# Patient Record
Sex: MALE | Race: WHITE | Employment: FULL TIME | URBAN - METROPOLITAN AREA
[De-identification: names, ages, dates, MRNs, and addresses within clinical notes are randomized per-mention and may not be internally consistent; named-entity substitution may affect disease eponyms.]

---

## 2017-05-02 DIAGNOSIS — I10 ESSENTIAL HYPERTENSION: ICD-10-CM

## 2017-05-02 RX ORDER — LISINOPRIL 10 MG/1
10 TABLET ORAL DAILY
Qty: 30 TABLET | Refills: 3 | Status: SHIPPED | OUTPATIENT
Start: 2017-05-02 | End: 2017-07-13 | Stop reason: SDUPTHER

## 2017-07-13 ENCOUNTER — OFFICE VISIT (OUTPATIENT)
Dept: FAMILY MEDICINE CLINIC | Age: 47
End: 2017-07-13
Payer: COMMERCIAL

## 2017-07-13 VITALS
WEIGHT: 296.2 LBS | TEMPERATURE: 97.5 F | BODY MASS INDEX: 41.47 KG/M2 | DIASTOLIC BLOOD PRESSURE: 77 MMHG | HEART RATE: 100 BPM | SYSTOLIC BLOOD PRESSURE: 119 MMHG | HEIGHT: 71 IN

## 2017-07-13 DIAGNOSIS — E55.9 HYPOVITAMINOSIS D: ICD-10-CM

## 2017-07-13 DIAGNOSIS — I10 ESSENTIAL HYPERTENSION: Primary | ICD-10-CM

## 2017-07-13 DIAGNOSIS — Z72.0 TOBACCO ABUSE: ICD-10-CM

## 2017-07-13 DIAGNOSIS — E66.01 MORBID OBESITY DUE TO EXCESS CALORIES (HCC): ICD-10-CM

## 2017-07-13 PROCEDURE — 99213 OFFICE O/P EST LOW 20 MIN: CPT | Performed by: FAMILY MEDICINE

## 2017-07-13 RX ORDER — LISINOPRIL 10 MG/1
10 TABLET ORAL DAILY
Qty: 90 TABLET | Refills: 1 | Status: SHIPPED | OUTPATIENT
Start: 2017-07-13 | End: 2018-02-01 | Stop reason: SDUPTHER

## 2017-07-13 ASSESSMENT — ENCOUNTER SYMPTOMS
CONSTIPATION: 0
ABDOMINAL PAIN: 0
COUGH: 0
COLOR CHANGE: 0
DIARRHEA: 0
TROUBLE SWALLOWING: 0
SHORTNESS OF BREATH: 0
VOMITING: 0
NAUSEA: 0

## 2018-02-01 DIAGNOSIS — I10 ESSENTIAL HYPERTENSION: ICD-10-CM

## 2018-02-01 NOTE — TELEPHONE ENCOUNTER
Please address the medication refill and close the encounter. If I can be of assistance, please route to the applicable pool. Thank you. Next Visit Date:  No future appointments.     Health Maintenance   Topic Date Due    Potassium monitoring  07/30/2017    Creatinine monitoring  07/30/2017    Flu vaccine (1) 09/01/2017    Diabetes screen  04/13/2019    Lipid screen  03/12/2021    DTaP/Tdap/Td vaccine (2 - Td) 08/25/2026    Pneumococcal med risk  Completed    HIV screen  Completed       Hemoglobin A1C (no units)   Date Value   04/13/2016 5.4             ( goal A1C is < 7)   No results found for: LABMICR  LDL Cholesterol (mg/dL)   Date Value   03/12/2016 148 (H)       (goal LDL is <100)   AST (U/L)   Date Value   03/12/2016 19     ALT (U/L)   Date Value   03/12/2016 21     BUN (mg/dL)   Date Value   07/30/2016 13     BP Readings from Last 3 Encounters:   07/13/17 119/77   11/08/16 140/90   08/25/16 136/80          (goal 120/80)    All Future Testing planned in [facility]  Lab Frequency Next Occurrence               Patient Active Problem List:     Elevated blood pressure reading     Smoker     Morbid obesity ([address]     Need for vaccination     Right arm numbness     Hypovitaminosis D     Tobacco abuse     Essential hypertension     Smoking

## 2018-02-02 RX ORDER — LISINOPRIL 10 MG/1
TABLET ORAL
Qty: 30 TABLET | Refills: 0 | Status: SHIPPED | OUTPATIENT
Start: 2018-02-02 | End: 2018-03-05 | Stop reason: SDUPTHER

## 2018-03-05 ENCOUNTER — OFFICE VISIT (OUTPATIENT)
Dept: FAMILY MEDICINE CLINIC | Age: 48
End: 2018-03-05
Payer: COMMERCIAL

## 2018-03-05 VITALS
HEART RATE: 102 BPM | TEMPERATURE: 98.8 F | BODY MASS INDEX: 43.82 KG/M2 | DIASTOLIC BLOOD PRESSURE: 89 MMHG | SYSTOLIC BLOOD PRESSURE: 154 MMHG | WEIGHT: 313 LBS | HEIGHT: 71 IN

## 2018-03-05 DIAGNOSIS — M25.561 CHRONIC PAIN OF RIGHT KNEE: ICD-10-CM

## 2018-03-05 DIAGNOSIS — M85.60 CYST, BONE: ICD-10-CM

## 2018-03-05 DIAGNOSIS — I10 ESSENTIAL HYPERTENSION: Primary | ICD-10-CM

## 2018-03-05 DIAGNOSIS — E55.9 VITAMIN D DEFICIENCY: ICD-10-CM

## 2018-03-05 DIAGNOSIS — G89.29 CHRONIC PAIN OF RIGHT KNEE: ICD-10-CM

## 2018-03-05 PROCEDURE — 99213 OFFICE O/P EST LOW 20 MIN: CPT | Performed by: HOSPITALIST

## 2018-03-05 RX ORDER — LISINOPRIL 10 MG/1
TABLET ORAL
Qty: 90 TABLET | Refills: 0 | Status: SHIPPED | OUTPATIENT
Start: 2018-03-05 | End: 2018-05-14 | Stop reason: SDUPTHER

## 2018-03-05 ASSESSMENT — ENCOUNTER SYMPTOMS
NAUSEA: 0
SORE THROAT: 0
RHINORRHEA: 0
COUGH: 0
VOMITING: 0
ABDOMINAL PAIN: 0
CONSTIPATION: 0
BACK PAIN: 0
WHEEZING: 0
BLOOD IN STOOL: 0
ANAL BLEEDING: 0
SHORTNESS OF BREATH: 0

## 2018-03-05 NOTE — PATIENT INSTRUCTIONS
ankles bent so that only your heels are digging into the floor. Your knees should be bent about 90 degrees. 2. Then push your heels into the floor, squeeze your buttocks, and lift your hips off the floor until your shoulders, hips, and knees are all in a straight line. 3. Hold for about 6 seconds as you continue to breathe normally, and then slowly lower your hips back down to the floor and rest for up to 10 seconds. 4. Do 8 to 12 repetitions. Hamstring curls    1. Lie on your stomach with your knees straight. If your kneecap is uncomfortable, roll up a washcloth and put it under your leg just above your kneecap. 2. Lift the foot of your injured leg by bending the knee so that you bring the foot up toward your buttock. If this motion hurts, try it without bending your knee quite as far. This may help you avoid any painful motion. 3. Slowly lower your leg back to the floor. 4. Do 8 to 12 repetitions. 5. With permission from your doctor or physical therapist, you may also want to add a cuff weight to your ankle (not more than 5 pounds). With weight, you do not have to lift your leg more than 12 inches to get a hamstring workout. Shallow standing knee bends    Do this exercise only if you have very little pain; if you have no clicking, locking, or giving way if you have an injured knee; and if it does not hurt while you are doing 8 to 12 repetitions. 1. Stand with your hands lightly resting on a counter or chair in front of you. Put your feet shoulder-width apart. 2. Slowly bend your knees so that you squat down like you are going to sit in a chair. Make sure your knees do not go in front of your toes. 3. Lower yourself about 6 inches. Your heels should remain on the floor at all times. 4. Rise slowly to a standing position. Heel raises    1. Stand with your feet a few inches apart, with your hands lightly resting on a counter or chair in front of you.   2. Slowly raise your heels off the floor while keeping your knees straight. 3. Hold for about 6 seconds, then slowly lower your heels to the floor. 4. Do 8 to 12 repetitions several times during the day. Follow-up care is a key part of your treatment and safety. Be sure to make and go to all appointments, and call your doctor if you are having problems. It's also a good idea to know your test results and keep a list of the medicines you take. Where can you learn more? Go to [email or web address]. org and sign in to your [employer] account. Enter N611 in the Kngine box to learn more about \"Knee: Exercises. \"     If you do not have an account, please click on the \"Sign Up Now\" link. Current as of: March 21, 2017  Content Version: 11.5  © 4140-1698 Healthwise, Incorporated. Care instructions adapted under license by [facility] ([facility]). If you have questions about a medical condition or this instruction, always ask your healthcare professional. [place] [address] any warranty or liability for your use of this information.

## 2018-03-06 NOTE — PROGRESS NOTES
Temp 98.8 °F (37.1 °C) (Temporal)   Ht 5' 11\" (1.803 m)   Wt (!) 313 lb (142 kg)   BMI 43.65 kg/m²    BP Readings from Last 3 Encounters:   03/05/18 (!) 154/89   07/13/17 119/77   11/08/16 140/90     Physical Exam   Constitutional: He is oriented to person, place, and time. He appears well-developed and well-nourished. Cardiovascular: Normal rate and regular rhythm. Pulmonary/Chest: Effort normal and breath sounds normal. No respiratory distress. Abdominal: Soft. Bowel sounds are normal. He exhibits no distension. Musculoskeletal: Normal range of motion. He exhibits no edema or tenderness. Right knee: He exhibits normal range of motion, normal alignment, no LCL laxity, normal meniscus and no MCL laxity. No tenderness found. No medial joint line, no lateral joint line, no MCL and no LCL tenderness noted. Arms:  Neurological: He is alert and oriented to person, place, and time. Lab Results   Component Value Date    WBC 6.3 03/12/2016    HGB 15.9 03/12/2016    HCT 47.0 03/12/2016     03/12/2016    CHOL 216 (H) 03/12/2016    TRIG 84 03/12/2016    HDL 51 03/12/2016    ALT 21 03/12/2016    AST 19 03/12/2016     07/30/2016    K 4.5 07/30/2016     07/30/2016    CREATININE 0.94 07/30/2016    BUN 13 07/30/2016    CO2 28 07/30/2016    TSH 1.73 03/12/2016    LABA1C 5.4 04/13/2016     Lab Results   Component Value Date    CALCIUM 9.4 07/30/2016     Lab Results   Component Value Date    LDLCHOLESTEROL 148 (H) 03/12/2016       Assessment:     1. Essential hypertension    2. Vitamin D deficiency    3. Chronic pain of right knee    4. Cyst, bone        Plan:    1. Essential hypertension  - stable  - lisinopril (PRINIVIL;ZESTRIL) 10 MG tablet; take 1 tablet by mouth once daily  Dispense: 90 tablet; Refill: 0  - Basic Metabolic Panel; Future    2.  Vitamin D deficiency  - will gt repeat level as previous level about 18 months ago was still low despite treatment  - Vitamin D 25 Medication Reason    ergocalciferol (DRISDOL) 34286 UNITS capsule Patient Choice    vitamin D (CHOLECALCIFEROL) 95538 UNIT CAPS Patient Choice    lisinopril (PRINIVIL;ZESTRIL) 10 MG tablet Reorder       Return in about 4 weeks (around 4/2/2018) for knee pain.

## 2018-05-14 ENCOUNTER — OFFICE VISIT (OUTPATIENT)
Dept: FAMILY MEDICINE CLINIC | Age: 48
End: 2018-05-14
Payer: COMMERCIAL

## 2018-05-14 VITALS
TEMPERATURE: 98.2 F | WEIGHT: 315 LBS | BODY MASS INDEX: 44.1 KG/M2 | HEIGHT: 71 IN | HEART RATE: 88 BPM | DIASTOLIC BLOOD PRESSURE: 73 MMHG | SYSTOLIC BLOOD PRESSURE: 130 MMHG

## 2018-05-14 DIAGNOSIS — M54.16 LUMBAR RADICULOPATHY: Primary | ICD-10-CM

## 2018-05-14 DIAGNOSIS — I10 ESSENTIAL HYPERTENSION: ICD-10-CM

## 2018-05-14 DIAGNOSIS — Z72.0 TOBACCO ABUSE: ICD-10-CM

## 2018-05-14 PROCEDURE — 99213 OFFICE O/P EST LOW 20 MIN: CPT | Performed by: HOSPITALIST

## 2018-05-14 RX ORDER — LISINOPRIL 10 MG/1
TABLET ORAL
Qty: 90 TABLET | Refills: 0 | Status: SHIPPED | OUTPATIENT
Start: 2018-05-14 | End: 2018-08-29 | Stop reason: SDUPTHER

## 2018-05-14 RX ORDER — NAPROXEN 500 MG/1
500 TABLET ORAL 2 TIMES DAILY WITH MEALS
Qty: 60 TABLET | Refills: 3 | Status: SHIPPED | OUTPATIENT
Start: 2018-05-14 | End: 2019-02-22 | Stop reason: SDUPTHER

## 2018-05-14 ASSESSMENT — PATIENT HEALTH QUESTIONNAIRE - PHQ9
SUM OF ALL RESPONSES TO PHQ QUESTIONS 1-9: 0
SUM OF ALL RESPONSES TO PHQ9 QUESTIONS 1 & 2: 0
1. LITTLE INTEREST OR PLEASURE IN DOING THINGS: 0
2. FEELING DOWN, DEPRESSED OR HOPELESS: 0

## 2018-05-14 ASSESSMENT — ENCOUNTER SYMPTOMS
ABDOMINAL PAIN: 0
BLOOD IN STOOL: 0
ANAL BLEEDING: 0
NAUSEA: 0
BACK PAIN: 0
VOMITING: 0
CONSTIPATION: 0

## 2018-08-29 DIAGNOSIS — I10 ESSENTIAL HYPERTENSION: ICD-10-CM

## 2018-08-29 RX ORDER — LISINOPRIL 10 MG/1
TABLET ORAL
Qty: 90 TABLET | Refills: 0 | Status: SHIPPED | OUTPATIENT
Start: 2018-08-29 | End: 2018-12-09 | Stop reason: SDUPTHER

## 2018-08-29 NOTE — TELEPHONE ENCOUNTER
Medication is on med list please review and address    Please address the medication refill and close the encounter. If I can be of assistance, please route to the applicable pool. Thank you. Last visit:n/a  Last Med refill:n/a    Next Visit Date:  No future appointments.     Health Maintenance   Topic Date Due    Potassium monitoring  07/30/2017    Creatinine monitoring  07/30/2017    Flu vaccine (1) 09/01/2018    Diabetes screen  04/13/2019    Lipid screen  03/12/2021    DTaP/Tdap/Td vaccine (2 - Td) 08/25/2026    Pneumococcal med risk  Completed    HIV screen  Completed       Hemoglobin A1C (no units)   Date Value   04/13/2016 5.4             ( goal A1C is < 7)   No results found for: LABMICR  LDL Cholesterol (mg/dL)   Date Value   03/12/2016 148 (H)       (goal LDL is <100)   AST (U/L)   Date Value   03/12/2016 19     ALT (U/L)   Date Value   03/12/2016 21     BUN (mg/dL)   Date Value   07/30/2016 13     BP Readings from Last 3 Encounters:   05/14/18 130/73   03/05/18 (!) 154/89   07/13/17 119/77          (goal 120/80)    All Future Testing planned in [facility]  Lab Frequency Next Occurrence   Basic Metabolic Panel Once 41/38/3329   Vitamin D 25 Hydroxy Once 09/05/2018   XR KNEE RIGHT (3 VIEWS) Once 09/04/2018               Patient Active Problem List:     Elevated blood pressure reading     Smoker     Morbid obesity ([address]     Need for vaccination     Right arm numbness     Hypovitaminosis D     Tobacco abuse     Essential hypertension     Smoking

## 2018-12-09 DIAGNOSIS — I10 ESSENTIAL HYPERTENSION: ICD-10-CM

## 2018-12-10 RX ORDER — LISINOPRIL 10 MG/1
TABLET ORAL
Qty: 30 TABLET | Refills: 0 | Status: SHIPPED | OUTPATIENT
Start: 2018-12-10 | End: 2019-01-30 | Stop reason: SDUPTHER

## 2019-01-20 DIAGNOSIS — I10 ESSENTIAL HYPERTENSION: ICD-10-CM

## 2019-01-21 RX ORDER — LISINOPRIL 10 MG/1
TABLET ORAL
Qty: 30 TABLET | Refills: 0 | OUTPATIENT
Start: 2019-01-21

## 2019-01-30 DIAGNOSIS — I10 ESSENTIAL HYPERTENSION: ICD-10-CM

## 2019-01-30 RX ORDER — LISINOPRIL 10 MG/1
TABLET ORAL
Qty: 30 TABLET | Refills: 0 | Status: SHIPPED | OUTPATIENT
Start: 2019-01-30 | End: 2019-02-22 | Stop reason: SDUPTHER

## 2019-02-22 ENCOUNTER — OFFICE VISIT (OUTPATIENT)
Dept: FAMILY MEDICINE CLINIC | Age: 49
End: 2019-02-22
Payer: COMMERCIAL

## 2019-02-22 VITALS
WEIGHT: 315 LBS | HEIGHT: 71 IN | HEART RATE: 90 BPM | SYSTOLIC BLOOD PRESSURE: 155 MMHG | DIASTOLIC BLOOD PRESSURE: 89 MMHG | BODY MASS INDEX: 44.1 KG/M2 | TEMPERATURE: 97.6 F

## 2019-02-22 DIAGNOSIS — G89.29 CHRONIC PAIN OF RIGHT KNEE: ICD-10-CM

## 2019-02-22 DIAGNOSIS — I10 ESSENTIAL HYPERTENSION: Primary | ICD-10-CM

## 2019-02-22 DIAGNOSIS — M25.561 CHRONIC PAIN OF RIGHT KNEE: ICD-10-CM

## 2019-02-22 PROCEDURE — 99213 OFFICE O/P EST LOW 20 MIN: CPT | Performed by: FAMILY MEDICINE

## 2019-02-22 PROCEDURE — 99211 OFF/OP EST MAY X REQ PHY/QHP: CPT | Performed by: FAMILY MEDICINE

## 2019-02-22 RX ORDER — NAPROXEN 500 MG/1
500 TABLET ORAL 2 TIMES DAILY WITH MEALS
Qty: 60 TABLET | Refills: 3 | Status: SHIPPED | OUTPATIENT
Start: 2019-02-22 | End: 2019-06-13 | Stop reason: SDUPTHER

## 2019-02-22 RX ORDER — LISINOPRIL 20 MG/1
TABLET ORAL
Qty: 30 TABLET | Refills: 3 | Status: SHIPPED | OUTPATIENT
Start: 2019-02-22 | End: 2019-06-08 | Stop reason: SDUPTHER

## 2019-02-22 ASSESSMENT — ENCOUNTER SYMPTOMS
COUGH: 0
ABDOMINAL PAIN: 0
DIARRHEA: 0
NAUSEA: 0
CONSTIPATION: 0
VOMITING: 0
SHORTNESS OF BREATH: 0

## 2019-02-22 ASSESSMENT — PATIENT HEALTH QUESTIONNAIRE - PHQ9
2. FEELING DOWN, DEPRESSED OR HOPELESS: 0
SUM OF ALL RESPONSES TO PHQ QUESTIONS 1-9: 0
1. LITTLE INTEREST OR PLEASURE IN DOING THINGS: 0
SUM OF ALL RESPONSES TO PHQ QUESTIONS 1-9: 0
SUM OF ALL RESPONSES TO PHQ9 QUESTIONS 1 & 2: 0

## 2019-02-28 ENCOUNTER — HOSPITAL ENCOUNTER (OUTPATIENT)
Dept: PHYSICAL THERAPY | Age: 49
Setting detail: THERAPIES SERIES
Discharge: HOME | End: 2019-02-28
Payer: COMMERCIAL

## 2019-02-28 PROCEDURE — 97110 THERAPEUTIC EXERCISES: CPT

## 2019-02-28 PROCEDURE — 97161 PT EVAL LOW COMPLEX 20 MIN: CPT

## 2019-03-20 ENCOUNTER — HOSPITAL ENCOUNTER (OUTPATIENT)
Dept: PHYSICAL THERAPY | Age: 49
Setting detail: THERAPIES SERIES
Discharge: HOME | End: 2019-03-20
Payer: COMMERCIAL

## 2019-03-20 ENCOUNTER — HOSPITAL ENCOUNTER (OUTPATIENT)
Age: 49
Setting detail: SPECIMEN
Discharge: HOME | End: 2019-03-20
Payer: COMMERCIAL

## 2019-03-20 DIAGNOSIS — I10 ESSENTIAL HYPERTENSION: ICD-10-CM

## 2019-03-20 LAB
ANION GAP SERPL CALCULATED.3IONS-SCNC: 11 MMOL/L (ref 9–17)
BUN BLDV-MCNC: 14 MG/DL (ref 6–20)
BUN/CREAT BLD: ABNORMAL (ref 9–20)
CALCIUM SERPL-MCNC: 9.5 MG/DL (ref 8.6–10.4)
CHLORIDE BLD-SCNC: 104 MMOL/L (ref 98–107)
CO2: 28 MMOL/L (ref 20–31)
CREAT SERPL-MCNC: 0.89 MG/DL (ref 0.7–1.2)
GFR AFRICAN AMERICAN: >60 ML/MIN
GFR NON-AFRICAN AMERICAN: >60 ML/MIN
GFR SERPL CREATININE-BSD FRML MDRD: ABNORMAL ML/MIN/{1.73_M2}
GFR SERPL CREATININE-BSD FRML MDRD: ABNORMAL ML/MIN/{1.73_M2}
GLUCOSE BLD-MCNC: 109 MG/DL (ref 70–99)
POTASSIUM SERPL-SCNC: 4.3 MMOL/L (ref 3.7–5.3)
SODIUM BLD-SCNC: 143 MMOL/L (ref 135–144)

## 2019-03-20 PROCEDURE — 97016 VASOPNEUMATIC DEVICE THERAPY: CPT

## 2019-03-20 PROCEDURE — 97110 THERAPEUTIC EXERCISES: CPT

## 2019-03-20 ASSESSMENT — PAIN DESCRIPTION - ONSET: ONSET: UNABLE TO TELL

## 2019-03-20 ASSESSMENT — PAIN SCALES - GENERAL: PAINLEVEL_OUTOF10: 2

## 2019-03-20 ASSESSMENT — PAIN DESCRIPTION - PROGRESSION: CLINICAL_PROGRESSION: GRADUALLY IMPROVING

## 2019-03-20 ASSESSMENT — PAIN DESCRIPTION - PAIN TYPE: TYPE: CHRONIC PAIN

## 2019-03-20 ASSESSMENT — PAIN DESCRIPTION - FREQUENCY: FREQUENCY: INTERMITTENT

## 2019-03-20 ASSESSMENT — PAIN DESCRIPTION - LOCATION: LOCATION: KNEE

## 2019-03-20 ASSESSMENT — PAIN DESCRIPTION - ORIENTATION: ORIENTATION: RIGHT

## 2019-03-21 ASSESSMENT — PAIN DESCRIPTION - LOCATION: LOCATION: KNEE

## 2019-03-21 ASSESSMENT — PAIN DESCRIPTION - ONSET: ONSET: UNABLE TO TELL

## 2019-03-21 ASSESSMENT — PAIN DESCRIPTION - PROGRESSION: CLINICAL_PROGRESSION: GRADUALLY IMPROVING

## 2019-03-21 ASSESSMENT — PAIN DESCRIPTION - PAIN TYPE: TYPE: CHRONIC PAIN

## 2019-03-21 ASSESSMENT — PAIN DESCRIPTION - FREQUENCY: FREQUENCY: INTERMITTENT

## 2019-03-21 ASSESSMENT — PAIN SCALES - GENERAL: PAINLEVEL_OUTOF10: 2

## 2019-03-21 ASSESSMENT — PAIN DESCRIPTION - ORIENTATION: ORIENTATION: RIGHT

## 2019-06-08 DIAGNOSIS — I10 ESSENTIAL HYPERTENSION: ICD-10-CM

## 2019-06-10 RX ORDER — LISINOPRIL 20 MG/1
TABLET ORAL
Qty: 30 TABLET | Refills: 3 | Status: SHIPPED | OUTPATIENT
Start: 2019-06-10 | End: 2019-09-16 | Stop reason: SDUPTHER

## 2019-06-10 NOTE — TELEPHONE ENCOUNTER
Please address the medication refill and close the encounter. If I can be of assistance, please route to the applicable pool. Thank you. Last visit: [id number]  Last Med refill: [id number]  Does patient have enough medication for 72 hours:   Next Visit Date:  No future appointments.     Health Maintenance   Topic Date Due    Diabetes screen  04/13/2019    Flu vaccine (Season Ended) 09/01/2019    Potassium monitoring  03/20/2020    Creatinine monitoring  03/20/2020    Lipid screen  03/12/2021    DTaP/Tdap/Td vaccine (2 - Td) 08/25/2026    Pneumococcal 0-64 years Vaccine  Completed    HIV screen  Completed       Hemoglobin A1C (no units)   Date Value   04/13/2016 5.4             ( goal A1C is < 7)   No results found for: LABMICR  LDL Cholesterol (mg/dL)   Date Value   03/12/2016 148 (H)       (goal LDL is <100)   AST (U/L)   Date Value   03/12/2016 19     ALT (U/L)   Date Value   03/12/2016 21     BUN (mg/dL)   Date Value   03/20/2019 14     BP Readings from Last 3 Encounters:   02/22/19 (!) 155/89   05/14/18 130/73   03/05/18 (!) 154/89          (goal 120/80)    All Future Testing planned in [facility]  Lab Frequency Next Occurrence               Patient Active Problem List:     Elevated blood pressure reading     Smoker     Morbid obesity ([address]     Need for vaccination     Right arm numbness     Hypovitaminosis D     Tobacco abuse     Essential hypertension     Smoking

## 2019-06-13 DIAGNOSIS — G89.29 CHRONIC PAIN OF RIGHT KNEE: ICD-10-CM

## 2019-06-13 DIAGNOSIS — M25.561 CHRONIC PAIN OF RIGHT KNEE: ICD-10-CM

## 2019-06-13 RX ORDER — NAPROXEN 500 MG/1
TABLET ORAL
Qty: 60 TABLET | Refills: 3 | Status: SHIPPED | OUTPATIENT
Start: 2019-06-13 | End: 2019-09-16 | Stop reason: SDUPTHER

## 2019-06-13 NOTE — TELEPHONE ENCOUNTER
Please address the medication refill and close the encounter. If I can be of assistance, please route to the applicable pool. Thank you. Last visit: [id number]  Last Med refill: [id number]  Does patient have enough medication for 72 hours:   Next Visit Date:  No future appointments.     Health Maintenance   Topic Date Due    Diabetes screen  04/13/2019    Flu vaccine (Season Ended) 09/01/2019    Potassium monitoring  03/20/2020    Creatinine monitoring  03/20/2020    Lipid screen  03/12/2021    DTaP/Tdap/Td vaccine (2 - Td) 08/25/2026    Pneumococcal 0-64 years Vaccine  Completed    HIV screen  Completed       Hemoglobin A1C (no units)   Date Value   04/13/2016 5.4             ( goal A1C is < 7)   No results found for: LABMICR  LDL Cholesterol (mg/dL)   Date Value   03/12/2016 148 (H)       (goal LDL is <100)   AST (U/L)   Date Value   03/12/2016 19     ALT (U/L)   Date Value   03/12/2016 21     BUN (mg/dL)   Date Value   03/20/2019 14     BP Readings from Last 3 Encounters:   02/22/19 (!) 155/89   05/14/18 130/73   03/05/18 (!) 154/89          (goal 120/80)    All Future Testing planned in [facility]  Lab Frequency Next Occurrence               Patient Active Problem List:     Elevated blood pressure reading     Smoker     Morbid obesity ([address]     Need for vaccination     Right arm numbness     Hypovitaminosis D     Tobacco abuse     Essential hypertension     Smoking

## 2019-09-15 ENCOUNTER — HOSPITAL ENCOUNTER (EMERGENCY)
Age: 49
Discharge: HOME | End: 2019-09-15
Attending: EMERGENCY MEDICINE
Payer: COMMERCIAL

## 2019-09-15 ENCOUNTER — APPOINTMENT (OUTPATIENT)
Dept: GENERAL RADIOLOGY | Age: 49
End: 2019-09-15
Payer: COMMERCIAL

## 2019-09-15 VITALS
BODY MASS INDEX: 42 KG/M2 | WEIGHT: 300 LBS | OXYGEN SATURATION: 96 % | RESPIRATION RATE: 16 BRPM | HEART RATE: 108 BPM | DIASTOLIC BLOOD PRESSURE: 86 MMHG | TEMPERATURE: 98 F | SYSTOLIC BLOOD PRESSURE: 157 MMHG | HEIGHT: 71 IN

## 2019-09-15 DIAGNOSIS — M79.671 RIGHT FOOT PAIN: Primary | ICD-10-CM

## 2019-09-15 PROCEDURE — 73630 X-RAY EXAM OF FOOT: CPT

## 2019-09-15 PROCEDURE — 99283 EMERGENCY DEPT VISIT LOW MDM: CPT

## 2019-09-15 ASSESSMENT — PAIN SCALES - GENERAL: PAINLEVEL_OUTOF10: 9

## 2019-09-15 ASSESSMENT — PAIN DESCRIPTION - PAIN TYPE: TYPE: ACUTE PAIN

## 2019-09-15 ASSESSMENT — PAIN DESCRIPTION - LOCATION: LOCATION: FOOT

## 2019-09-15 ASSESSMENT — PAIN DESCRIPTION - FREQUENCY: FREQUENCY: CONTINUOUS

## 2019-09-15 ASSESSMENT — PAIN DESCRIPTION - DESCRIPTORS: DESCRIPTORS: ACHING

## 2019-09-15 ASSESSMENT — PAIN DESCRIPTION - ORIENTATION: ORIENTATION: RIGHT

## 2019-09-15 NOTE — ED PROVIDER NOTES
ALLERGIES     has No Known Allergies. FAMILY HISTORY     He indicated that the status of his mother is unknown. He indicated that the status of his father is unknown. SOCIAL HISTORY      reports that he has been smoking. He has never used smokeless tobacco. He reports that he does not drink alcohol or use drugs. PHYSICAL EXAM     INITIAL VITALS: BP (!) 157/86   Pulse 108   Temp 98 °F (36.7 °C) (Oral)   Resp 16   Ht 5' 11\" (1.803 m)   Wt 300 lb (136.1 kg)   SpO2 96%   BMI 41.84 kg/m²      Physical Exam   Constitutional: He is oriented to person, place, and time. He appears well-developed and well-nourished. HENT:   Head: Normocephalic and atraumatic. Cardiovascular: Normal rate, regular rhythm and normal heart sounds. Pulmonary/Chest: Effort normal and breath sounds normal.   Musculoskeletal: He exhibits no edema, tenderness or deformity. Neurological: He is alert and oriented to person, place, and time. Skin: Skin is warm. Capillary refill takes less than 2 seconds. Nursing note and vitals reviewed. MEDICAL DECISION MAKING:     Foot pain after stepping up a truck bumper. Stepped in the arch of his foot felt sudden onset of pain to the arch. No other injury or trauma. X-ray of the right foot shows no acute bony abnormalities. Recommend ice Ace wrap Tylenol Motrin for pain follow-up with primary care physician 1 to 2 days for recheck    DIAGNOSTIC RESULTS     EKG: All EKG's are interpreted by the Emergency Department Physician who either signs or Co-signs this chart in the absence of acardiologist    RADIOLOGY:Allplain film, CT, MRI, and formal ultrasound images (except ED bedside ultrasound) are read by the radiologist and the images and interpretations are directly viewed by the emergency physician. LABS:All lab results were reviewed by myself, and all abnormals are listed below.   Labs Reviewed - No data to display      EMERGENCY DEPARTMENT COURSE:

## 2019-09-16 ENCOUNTER — OFFICE VISIT (OUTPATIENT)
Dept: FAMILY MEDICINE CLINIC | Age: 49
End: 2019-09-16
Payer: COMMERCIAL

## 2019-09-16 VITALS
DIASTOLIC BLOOD PRESSURE: 88 MMHG | HEIGHT: 71 IN | HEART RATE: 87 BPM | BODY MASS INDEX: 44.1 KG/M2 | WEIGHT: 315 LBS | SYSTOLIC BLOOD PRESSURE: 137 MMHG

## 2019-09-16 DIAGNOSIS — I10 ESSENTIAL HYPERTENSION: ICD-10-CM

## 2019-09-16 DIAGNOSIS — G89.29 CHRONIC PAIN OF RIGHT KNEE: ICD-10-CM

## 2019-09-16 DIAGNOSIS — S93.601A SPRAIN OF RIGHT FOOT, INITIAL ENCOUNTER: Primary | ICD-10-CM

## 2019-09-16 DIAGNOSIS — M25.561 CHRONIC PAIN OF RIGHT KNEE: ICD-10-CM

## 2019-09-16 PROCEDURE — 99213 OFFICE O/P EST LOW 20 MIN: CPT | Performed by: STUDENT IN AN ORGANIZED HEALTH CARE EDUCATION/TRAINING PROGRAM

## 2019-09-16 RX ORDER — LISINOPRIL 20 MG/1
20 TABLET ORAL DAILY
Qty: 30 TABLET | Refills: 3 | Status: SHIPPED | OUTPATIENT
Start: 2019-09-16 | End: 2020-02-07

## 2019-09-16 RX ORDER — NAPROXEN 500 MG/1
TABLET ORAL
Qty: 60 TABLET | Refills: 3 | Status: SHIPPED | OUTPATIENT
Start: 2019-09-16 | End: 2022-03-21 | Stop reason: SDUPTHER

## 2019-09-16 ASSESSMENT — ENCOUNTER SYMPTOMS
CONSTIPATION: 0
WHEEZING: 0
CHEST TIGHTNESS: 0
BLOOD IN STOOL: 0
PHOTOPHOBIA: 0
BACK PAIN: 0
EYE PAIN: 0
NAUSEA: 0
ABDOMINAL PAIN: 0
DIARRHEA: 0
EYE DISCHARGE: 0
COUGH: 0
SHORTNESS OF BREATH: 0
SORE THROAT: 0
EYE REDNESS: 0

## 2019-09-16 NOTE — PROGRESS NOTES
Visit Information    Have you changed or started any medications since your last visit including any over-the-counter medicines, vitamins, or herbal medicines? no   Have you stopped taking any of your medications? Is so, why? -  no  Are you having any side effects from any of your medications? - no    Have you seen any other physician or provider since your last visit?  no   Have you had any other diagnostic tests since your last visit?  no   Have you been seen in the emergency room and/or had an admission in a hospital since we last saw you?  no   Have you had your routine dental cleaning in the past 6 months?  no     Do you have an active MyChart account? If no, what is the barrier?   No: pending    Patient Care Team:  Se Ruiz MD as PCP - General (Family Medicine)    Medical History Review  Past Medical, Family, and Social History reviewed and does not contribute to the patient presenting condition    Health Maintenance   Topic Date Due    Diabetes screen  04/13/2019    Shingles Vaccine (1 of 2) 08/23/2019    Flu vaccine (1) 09/01/2019    Colon cancer screen colonoscopy  08/23/2019    Potassium monitoring  03/20/2020    Creatinine monitoring  03/20/2020    Lipid screen  03/12/2021    DTaP/Tdap/Td vaccine (2 - Td) 08/25/2026    Pneumococcal 0-64 years Vaccine  Completed    HIV screen  Completed

## 2019-09-16 NOTE — LETTER
[facility]  [facility] [address]  Phone: [phone]  Fax: [phone]    Hao Mathews MD        September 16, 2019     Patient: Maria Alejandra Quintero   YOB: 1970   Date of Visit: 9/16/2019       To Whom it May Concern:    Alice Addison was seen in my clinic on 9/16/2019. He may return to work on 09/18/19. Use caution with foot. If you have any questions or concerns, please don't hesitate to call.     Sincerely,         Hao Mathews MD

## 2020-02-07 RX ORDER — LISINOPRIL 20 MG/1
TABLET ORAL
Qty: 30 TABLET | Refills: 3 | Status: SHIPPED | OUTPATIENT
Start: 2020-02-07 | End: 2020-05-25

## 2020-05-25 RX ORDER — LISINOPRIL 20 MG/1
TABLET ORAL
Qty: 30 TABLET | Refills: 3 | Status: SHIPPED | OUTPATIENT
Start: 2020-05-25 | End: 2020-07-21

## 2020-06-11 NOTE — TELEPHONE ENCOUNTER
E-scribe request for pending medciation. Please review and e-scribe if applicable.      Last Visit Date:  9-16-19  Next Visit Date:  Visit date not found    Hemoglobin A1C (no units)   Date Value   04/13/2016 5.4             ( goal A1C is < 7)   No results found for: LABMICR  LDL Cholesterol (mg/dL)   Date Value   03/12/2016 148 (H)       (goal LDL is <100)   AST (U/L)   Date Value   03/12/2016 19     ALT (U/L)   Date Value   03/12/2016 21     BUN (mg/dL)   Date Value   03/20/2019 14     BP Readings from Last 3 Encounters:   09/16/19 137/88   09/15/19 (!) 157/86   02/22/19 (!) 155/89          (goal 120/80)        Patient Active Problem List:     Elevated blood pressure reading     Smoker     Morbid obesity ([facility] [address]     Need for vaccination     Right arm numbness     Hypovitaminosis D     Tobacco abuse     Essential hypertension     Smoking      ----Steve Manjarrez

## 2020-07-21 RX ORDER — LISINOPRIL 20 MG/1
TABLET ORAL
Qty: 30 TABLET | Refills: 3 | Status: SHIPPED | OUTPATIENT
Start: 2020-07-21 | End: 2021-03-23

## 2021-03-22 ENCOUNTER — NURSE TRIAGE (OUTPATIENT)
Dept: OTHER | Facility: CLINIC | Age: 51
End: 2021-03-22

## 2021-03-22 ENCOUNTER — OFFICE VISIT (OUTPATIENT)
Dept: FAMILY MEDICINE CLINIC | Age: 51
End: 2021-03-22
Payer: COMMERCIAL

## 2021-03-22 VITALS
BODY MASS INDEX: 42.84 KG/M2 | TEMPERATURE: 98.2 F | HEIGHT: 71 IN | HEART RATE: 95 BPM | DIASTOLIC BLOOD PRESSURE: 75 MMHG | WEIGHT: 306 LBS | SYSTOLIC BLOOD PRESSURE: 118 MMHG

## 2021-03-22 DIAGNOSIS — E08.3293 DIABETES MELLITUS DUE TO UNDERLYING CONDITION WITH MILD NONPROLIFERATIVE RETINOPATHY OF BOTH EYES, WITHOUT LONG-TERM CURRENT USE OF INSULIN, MACULAR EDEMA PRESENCE UNSPECIFIED (HCC): Primary | ICD-10-CM

## 2021-03-22 DIAGNOSIS — Z11.59 ENCOUNTER FOR HCV SCREENING TEST FOR LOW RISK PATIENT: ICD-10-CM

## 2021-03-22 DIAGNOSIS — Z23 NEED FOR PROPHYLACTIC VACCINATION AND INOCULATION AGAINST VARICELLA: ICD-10-CM

## 2021-03-22 DIAGNOSIS — Z12.2 ENCOUNTER FOR SCREENING FOR LUNG CANCER: ICD-10-CM

## 2021-03-22 DIAGNOSIS — I10 ESSENTIAL HYPERTENSION: ICD-10-CM

## 2021-03-22 DIAGNOSIS — Z13.220 SCREENING FOR HYPERLIPIDEMIA: ICD-10-CM

## 2021-03-22 PROCEDURE — 83036 HEMOGLOBIN GLYCOSYLATED A1C: CPT | Performed by: STUDENT IN AN ORGANIZED HEALTH CARE EDUCATION/TRAINING PROGRAM

## 2021-03-22 PROCEDURE — 99213 OFFICE O/P EST LOW 20 MIN: CPT | Performed by: STUDENT IN AN ORGANIZED HEALTH CARE EDUCATION/TRAINING PROGRAM

## 2021-03-22 RX ORDER — LIRAGLUTIDE 6 MG/ML
0.6 INJECTION SUBCUTANEOUS DAILY
Qty: 2 PEN | Refills: 3 | Status: SHIPPED | OUTPATIENT
Start: 2021-03-22 | End: 2021-09-20

## 2021-03-22 RX ORDER — ASPIRIN 81 MG/1
81 TABLET ORAL DAILY
Qty: 90 TABLET | Refills: 1 | Status: SHIPPED | OUTPATIENT
Start: 2021-03-22 | End: 2021-09-23

## 2021-03-22 ASSESSMENT — ENCOUNTER SYMPTOMS
SINUS PAIN: 0
SHORTNESS OF BREATH: 0
BACK PAIN: 0
ABDOMINAL PAIN: 0
VOICE CHANGE: 0
NAUSEA: 0
COUGH: 0
WHEEZING: 0
ABDOMINAL DISTENTION: 0
SINUS PRESSURE: 0
COLOR CHANGE: 0
VOMITING: 0

## 2021-03-22 NOTE — PROGRESS NOTES
3/2/2020                  RE: Bruno CUELLAR [employer]        To Whom it May Concern:    Bruno was seen in the clinic today 3/2/2020 by Dr Sesay for an office visit. If any questions please call the office at [phone].        Sincerely,           Sruthi Sesay MD  [facility]  [address] [address]  [phone]  Dept: [phone]     Subjective:    Jeffrey Yanez is a 45 y.o. male with  has a past medical history of Essential hypertension, Morbid obesity ([address] and Tobacco abuse. Presented to the office today for:  Chief Complaint   Patient presents with    Urinary Tract Infection       HPI    Patient is a 69-year-old male with past medical history of hypertension. He is presenting today for symptoms of polyuria, polydipsia, urinary frequency for 3 months. Patient denies any burning sensation with urination. Does not have any significant family history of prostate cancer. No past medical history of diabetes. Denies any abdominal pain, urethral discharge. No other complaints at this time. Patient states that he gets frequent UTIs and is concerned he has another UTI. Review of Systems   Constitutional: Positive for unexpected weight change (Approximately 20 pound weight loss in 2 years). Negative for fatigue and fever. HENT: Negative for sinus pressure, sinus pain and voice change. Eyes: Negative for visual disturbance. Respiratory: Negative for cough, shortness of breath and wheezing. Cardiovascular: Negative for chest pain, palpitations and leg swelling. Gastrointestinal: Negative for abdominal distention, abdominal pain, nausea and vomiting. Polydipsia   Endocrine: Negative for polydipsia, polyphagia and polyuria. Genitourinary: Positive for difficulty urinating, frequency and urgency. Negative for decreased urine volume, discharge, flank pain, hematuria, penile pain and testicular pain. Musculoskeletal: Negative for back pain, gait problem and joint swelling. Skin: Negative for color change, rash and wound. Neurological: Negative for dizziness, light-headedness, numbness and headaches. Psychiatric/Behavioral: Negative for confusion and decreased concentration. The patient is not nervous/anxious.                   The patient has a   Family History   Problem Relation Age of Onset    Heart Disease Mother     High Blood Pressure Mother     Diabetes Mother     Cancer Father        Objective:    /75 (Site: Right Upper Arm, Position: Sitting, Cuff Size: Large Adult)   Pulse 95   Temp 98.2 °F (36.8 °C) (Temporal)   Ht 5' 11\" (1.803 m)   Wt (!) 306 lb (138.8 kg)   BMI 42.68 kg/m²    BP Readings from Last 3 Encounters:   03/22/21 118/75   09/16/19 137/88   09/15/19 (!) 157/86       Physical Exam  Constitutional:       Appearance: He is well-developed. He is obese. HENT:      Head: Normocephalic and atraumatic. Eyes:      Pupils: Pupils are equal, round, and reactive to light. Cardiovascular:      Rate and Rhythm: Normal rate and regular rhythm. Heart sounds: Normal heart sounds. No murmur. No friction rub. No gallop. Pulmonary:      Effort: Pulmonary effort is normal. No respiratory distress. Breath sounds: Normal breath sounds. No wheezing or rales. Chest:      Chest wall: No tenderness. Abdominal:      General: Bowel sounds are normal. There is no distension. Palpations: Abdomen is soft. Tenderness: There is no abdominal tenderness. Musculoskeletal:         General: No swelling, tenderness, deformity or signs of injury. Skin:     General: Skin is warm. Findings: No erythema or rash. Neurological:      Mental Status: He is alert and oriented to person, place, and time. Lab Results   Component Value Date    WBC 6.3 03/12/2016    HGB 15.9 03/12/2016    HCT 47.0 03/12/2016     03/12/2016    CHOL 216 (H) 03/12/2016    TRIG 84 03/12/2016    HDL 51 03/12/2016    ALT 21 03/12/2016    AST 19 03/12/2016     03/20/2019    K 4.3 03/20/2019     03/20/2019    CREATININE 0.89 03/20/2019    BUN 14 03/20/2019    CO2 28 03/20/2019    TSH 1.73 03/12/2016    LABA1C 5.4 04/13/2016     Lab Results   Component Value Date    CALCIUM 9.5 03/20/2019     Lab Results   Component Value Date    LDLCHOLESTEROL 148 (H) 03/12/2016       Assessment and Plan:    1. 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 Discussed the option of starting patient on insulin. Patient would like to try medications if possible before he goes to insulin. Discussed with patient starting him on Metformin 1000 mg twice daily and Victoza injections daily. Also will refer patient to clinical pharmacy for education on medications. - metFORMIN (GLUCOPHAGE) 1000 MG tablet; Take 1 tablet by mouth 2 times daily (with meals)  Dispense: 60 tablet; Refill: 0  - Liraglutide (VICTOZA) 18 MG/3ML SOPN SC injection; Inject 0.6 mg into the skin daily  Dispense: 2 pen; Refill: 3  - [facility]) Medication Mgmt ([facility]) - [facility] patient extensively on weight loss, dietary management and smoking cessation. We will bring patient back in 1 month and give him all the instructions and material required for regular glucose monitoring. We will also reassess how patient is tolerating medications at that time. 2. Essential hypertension  - Controlled  - Basic Metabolic Panel; Future  - Microalbumin, Ur; Future    3. Screening for hyperlipidemia  - Lipid, Fasting; Future    4. Encounter for HCV screening test for low risk patient  - Hepatitis C Antibody; Future    5. Encounter for screening for lung cancer  - CT lung screen [Initial/Annual];  Future          Requested Prescriptions     Signed Prescriptions Disp Refills    metFORMIN (GLUCOPHAGE) 1000 MG tablet 60 tablet 0     Sig: Take 1 tablet by mouth 2 times daily (with meals)    Liraglutide (VICTOZA) 18 MG/3ML SOPN SC injection 2 pen 3     Sig: Inject 0.6 mg into the skin daily    aspirin EC 81 MG EC tablet 90 tablet 1     Sig: Take 1 tablet by mouth daily       There are no discontinued medications. Arnaldo received counseling on the following healthy behaviors: nutrition, exercise and medication adherence    Discussed use,benefit, and side effects of prescribed medications. Barriers to medication compliance addressed. All patient questions answered. Pt voiced understanding. Return in about 4 weeks (around 4/19/2021) for new diagnosis DMT2, please schedule with Dr. Lissett Huang only. Disclaimer: Some orall of this note was transcribed using voice-recognition software. This may cause typographical errors occasionally. Although all effort is made to fix these errors, please do not hesitate to contact our office if there Spero Getting concern with the understanding of this note.

## 2021-03-22 NOTE — PROGRESS NOTES
Visit Information    Have you changed or started any medications since your last visit including any over-the-counter medicines, vitamins, or herbal medicines? no   Have you stopped taking any of your medications? Is so, why? -  no  Are you having any side effects from any of your medications? - no    Have you seen any other physician or provider since your last visit?  no   Have you had any other diagnostic tests since your last visit?  no   Have you been seen in the emergency room and/or had an admission in a hospital since we last saw you?  no   Have you had your routine dental cleaning in the past 6 months?  no     Do you have an active MyChart account? If no, what is the barrier?   Yes    Patient Care Team:  Charly Knight MD as PCP - General (Family Medicine)    Medical History Review  Past Medical, Family, and Social History reviewed and does not contribute to the patient presenting condition    Health Maintenance   Topic Date Due    Hepatitis C screen  Never done    COVID-19 Vaccine (1) Never done    Diabetes screen  04/13/2019    Shingles Vaccine (1 of 2) Never done    Colon cancer screen colonoscopy  Never done    Potassium monitoring  03/20/2020    Creatinine monitoring  03/20/2020    Flu vaccine (1) Never done    Lipid screen  03/12/2021    DTaP/Tdap/Td vaccine (2 - Td) 08/25/2026    Pneumococcal 0-64 years Vaccine  Completed    HIV screen  Completed    Hepatitis A vaccine  Aged Out    Hepatitis B vaccine  Aged Out    Hib vaccine  Aged Out    Meningococcal (ACWY) vaccine  Aged Out

## 2021-03-23 ENCOUNTER — HOSPITAL ENCOUNTER (OUTPATIENT)
Age: 51
Setting detail: SPECIMEN
Discharge: HOME | End: 2021-03-23
Payer: COMMERCIAL

## 2021-03-23 DIAGNOSIS — I10 ESSENTIAL HYPERTENSION: ICD-10-CM

## 2021-03-23 LAB — HBA1C MFR BLD: 12.8 %

## 2021-03-24 LAB
CREATININE URINE: 81.4 MG/DL (ref 39–259)
MICROALBUMIN/CREAT 24H UR: 125 MG/L
MICROALBUMIN/CREAT UR-RTO: 154 MCG/MG CREAT

## 2021-04-03 ENCOUNTER — HOSPITAL ENCOUNTER (OUTPATIENT)
Age: 51
Discharge: HOME | End: 2021-04-03
Payer: COMMERCIAL

## 2021-04-03 DIAGNOSIS — Z11.59 ENCOUNTER FOR HCV SCREENING TEST FOR LOW RISK PATIENT: ICD-10-CM

## 2021-04-03 DIAGNOSIS — I10 ESSENTIAL HYPERTENSION: ICD-10-CM

## 2021-04-03 DIAGNOSIS — Z13.220 SCREENING FOR HYPERLIPIDEMIA: ICD-10-CM

## 2021-04-03 LAB
ANION GAP SERPL CALCULATED.3IONS-SCNC: 11 MMOL/L (ref 9–17)
BUN BLDV-MCNC: 21 MG/DL (ref 6–20)
BUN/CREAT BLD: ABNORMAL (ref 9–20)
CALCIUM SERPL-MCNC: 9.4 MG/DL (ref 8.6–10.4)
CHLORIDE BLD-SCNC: 99 MMOL/L (ref 98–107)
CHOLESTEROL, FASTING: 186 MG/DL
CHOLESTEROL/HDL RATIO: 4.3
CO2: 24 MMOL/L (ref 20–31)
CREAT SERPL-MCNC: 1.04 MG/DL (ref 0.7–1.2)
CREATININE URINE: 98.6 MG/DL (ref 39–259)
GFR AFRICAN AMERICAN: >60 ML/MIN
GFR NON-AFRICAN AMERICAN: >60 ML/MIN
GFR SERPL CREATININE-BSD FRML MDRD: ABNORMAL ML/MIN/{1.73_M2}
GFR SERPL CREATININE-BSD FRML MDRD: ABNORMAL ML/MIN/{1.73_M2}
GLUCOSE BLD-MCNC: 159 MG/DL (ref 70–99)
HDLC SERPL-MCNC: 43 MG/DL
HEPATITIS C ANTIBODY: NONREACTIVE
LDL CHOLESTEROL: 116 MG/DL (ref 0–130)
MICROALBUMIN/CREAT 24H UR: 152 MG/L
MICROALBUMIN/CREAT UR-RTO: 154 MCG/MG CREAT
POTASSIUM SERPL-SCNC: 4.6 MMOL/L (ref 3.7–5.3)
SODIUM BLD-SCNC: 134 MMOL/L (ref 135–144)
TRIGLYCERIDE, FASTING: 136 MG/DL
VLDLC SERPL CALC-MCNC: NORMAL MG/DL (ref 1–30)

## 2021-04-03 PROCEDURE — 82570 ASSAY OF URINE CREATININE: CPT

## 2021-04-03 PROCEDURE — 80048 BASIC METABOLIC PNL TOTAL CA: CPT

## 2021-04-03 PROCEDURE — 80061 LIPID PANEL: CPT

## 2021-04-03 PROCEDURE — 82043 UR ALBUMIN QUANTITATIVE: CPT

## 2021-04-03 PROCEDURE — 36415 COLL VENOUS BLD VENIPUNCTURE: CPT

## 2021-04-03 PROCEDURE — 86803 HEPATITIS C AB TEST: CPT

## 2021-04-06 ENCOUNTER — TELEPHONE (OUTPATIENT)
Dept: FAMILY MEDICINE CLINIC | Age: 51
End: 2021-04-06

## 2021-04-06 ENCOUNTER — OFFICE VISIT (OUTPATIENT)
Dept: FAMILY MEDICINE CLINIC | Age: 51
End: 2021-04-06
Payer: COMMERCIAL

## 2021-04-06 DIAGNOSIS — Z79.899 MEDICATION MANAGEMENT: Primary | ICD-10-CM

## 2021-04-06 PROCEDURE — APPNB30 APP NON BILLABLE TIME 0-30 MINS: Performed by: PHARMACIST

## 2021-04-06 NOTE — PROGRESS NOTES
Medication Management Service  [place] Bean is a 45 y.o. male who was called by pharmacy for management of diabetes for education per referral from [facility]. Pt presented to [facility] on 3/22/21 with complaints of UTI, dysuria, and excessive thirst. A1c found to be 12.8% and diagnosed with T2DM. Was started on metformin 1000mg BID and Victoza 0.6mg daily. MMS calling for follow-up and education. Subjective/Objective     New Problems/Changes: No problems, tolerating medication well. DIET  Pt reports improvements to his diet since new diagnosis. Has cut out a log of sugar, eating more grains/chicken. Less [employer]. Is [place] and likes to eat pasta and bread. EXERCISE  DND    WEIGHT 306lb on 3/23/21  Noted he has lost 4 lbs since that and is now at 302 as of 21. DIABETES MANAGEMENT  Most Recent A1c: 12.8% on 3/23/21    Home Glucose Readings  Pt reported BG the day after first Victoza injection was 300. Next day - 245, then 230, and 210 and has been between 145-170 ever since. Hypoglycemia: None    Microalbumin/Creatinine Ratio: 154    Eye Exam: DND    Foot Exam: DND    BLOOD PRESSURE MANAGEMENT  Clinic BP Readin/75 on 3/22/21    MEDICATIONS    Diabetes Medications:   Metformin 1000mg BID  Victoza 0.6mg once daily - taking in AM    HTN Medications: lisinopril 20mg     Hyperlipidemia Medications: No    On Statin: No    On Aspirin: Yes    On ACE-I or ARB for HTN or Microalbuminuria: Yes    Medication Adherence/Cost/Adverse Events: None disclosed. Assessment/Plan     Diabetes Management:   Pt called for follow-up regarding new DM diagnosis education and medication check-in. Pt reports \"feeling great. \" He is no longer experiencing polyuria or polydipsia. Pt has been taking metformin and Victoza as prescribed with no concerns or side effects. He was educated on mechanism and minimal hypoglycemic risk of both medications.  Discussed diabetes diagnosis and A1c/BG correlation. Pt has been testing a fasting BG daily each morning and recording in log book that came with his meter. He has also made adjustments to his diet and is being more cautious of portion sizes. BG goals were discussed. Pt was commended for his progress. Pt requested pen needles script - sent to [facility] on [address]. Lipid Management:   Due to pt being aged 43-69 and having a DM diagnosis, according to the ADA pt is candidate for moderate dose statin therapy. His ASCVD risk = intermediate risk, also suggests moderate statin. The 10-year ASCVD risk score (La Omer, et al., 2013) is: 8.7%    Values used to calculate the score:      Age: 45 years      Sex: Male      Is Non- : No      Diabetic: No      Tobacco smoker: Yes      Systolic Blood Pressure: 336 mmHg      Is BP treated: Yes      HDL Cholesterol: 43 mg/dL      Total Cholesterol: 186 mg/dL      Next Follow-Up: With PCP on 4/22/21. Medication Management will have no further follow-up at this time, but is available in the future for any further medication needs that may arise. Patient verbalized understanding of care plan.  Patient advised to call Medication Management with any questions, concerns, or changes prior to next appointment    Education   The following education was provided during today's visit:     [x] Medication adherence   [] Insulin technique and storage   [x] Healthy lifestyle including diet and exercise changes   [x] Hypoglycemia symptoms and management   [x] Blood glucose goals     Time Spent: 20 minutes, phone-call    Tano Newton PharmD  PGY-2 Ambulatory Care Resident Pharmacist  Medication Management Service  [phone]  4/6/2021  3:01 PM    =======================================================    CLINICAL PHARMACY CONSULT: MED RECONCILIATION/REVIEW ADDENDUM    For Pharmacy Admin Tracking Only    PHSO: Yes  Total # of Interventions Recommended: 1  - New Order #: 1 New Medication Order Reason(s): Needs Additional Medication Therapy  Total Interventions Accepted: 1  Time Spent (min): Hayden 1, PharmD  [address]

## 2021-04-06 NOTE — TELEPHONE ENCOUNTER
Medication Management Service  Amish Ferguson is a 45 y.o. male who was called by pharmacy for diabetes education. First attempt made to reach patient by telephone. Left message for patient to return phone call to [phone]. Will continue to attempt to contact patient by telephone as appropriate. Planning to discuss:  New referral for DM education - recent A1c 12.8% and started on Victoza 0.6mg and Metformin 1000mg BID. Both have been picked up from [facility] on 3/23 along with lisinopril. Pt in need of statin? Not currently checking BG - PCP wanting to wait for 1 mo f/u to start testing.        Kingston Wagner, PharmD  PGY-2 Ambulatory Care Resident Pharmacist  Medication Management Service  [phone]  4/6/2021  11:13 AM

## 2021-04-14 DIAGNOSIS — E08.3293 DIABETES MELLITUS DUE TO UNDERLYING CONDITION WITH MILD NONPROLIFERATIVE RETINOPATHY OF BOTH EYES, WITHOUT LONG-TERM CURRENT USE OF INSULIN, MACULAR EDEMA PRESENCE UNSPECIFIED (HCC): ICD-10-CM

## 2021-04-14 NOTE — TELEPHONE ENCOUNTER
Last visit:   Last Med refill:   Does patient have enough medication for 72 hours: No:     Next Visit Date:  Future Appointments   Date Time Provider Amish Giraldo   4/22/2021  4:00 PM Sofia Palencia MD [address] [place] [address] Maintenance   Topic Date Due    Diabetic foot exam  Never done    Diabetic retinal exam  Never done    COVID-19 Vaccine (1) Never done    Shingles Vaccine (1 of 2) Never done    Colon cancer screen colonoscopy  Never done    A1C test (Diabetic or Prediabetic)  06/23/2021    Flu vaccine (Season Ended) 09/01/2021    Diabetic microalbuminuria test  04/03/2022    Lipid screen  04/03/2022    Potassium monitoring  04/03/2022    Creatinine monitoring  04/03/2022    DTaP/Tdap/Td vaccine (2 - Td) 08/25/2026    Pneumococcal 0-64 years Vaccine  Completed    Hepatitis C screen  Completed    HIV screen  Completed    Hepatitis A vaccine  Aged Out    Hepatitis B vaccine  Aged Out    Hib vaccine  Aged Out    Meningococcal (ACWY) vaccine  Aged Out       Hemoglobin A1C   Date Value   03/23/2021 12.8 %   04/13/2016 5.4             ( goal A1C is < 7)   Microalb/Crt.  Ratio (mcg/mg creat)   Date Value   04/03/2021 154 (H)     LDL Cholesterol (mg/dL)   Date Value   04/03/2021 116   03/12/2016 148 (H)       (goal LDL is <100)   AST (U/L)   Date Value   03/12/2016 19     ALT (U/L)   Date Value   03/12/2016 21     BUN (mg/dL)   Date Value   04/03/2021 21 (H)     BP Readings from Last 3 Encounters:   03/22/21 118/75   09/16/19 137/88   09/15/19 (!) 157/86          (goal 120/80)    All Future Testing planned in [facility]  Lab Frequency Next Occurrence   CT lung screen [Initial/Annual] Once 12/92/8292   Basic Metabolic Panel Once 53/42/9270               Patient Active Problem List:     Elevated blood pressure reading     Smoker     Morbid obesity ([address]     Need for vaccination     Right arm numbness     Hypovitaminosis D     Tobacco abuse     Essential hypertension     Smoking Please address the medication refill and close the encounter. If I can be of assistance, please route to the applicable pool. Thank you.

## 2021-04-18 DIAGNOSIS — I10 ESSENTIAL HYPERTENSION: ICD-10-CM

## 2021-04-19 RX ORDER — LISINOPRIL 20 MG/1
TABLET ORAL
Qty: 30 TABLET | Refills: 0 | Status: SHIPPED | OUTPATIENT
Start: 2021-04-19 | End: 2021-05-18

## 2021-04-19 NOTE — TELEPHONE ENCOUNTER
Lisinopril pending for refill     Health Maintenance   Topic Date Due    Diabetic foot exam  Never done    Diabetic retinal exam  Never done    COVID-19 Vaccine (1) Never done    Shingles Vaccine (1 of 2) Never done    Colon cancer screen colonoscopy  Never done    A1C test (Diabetic or Prediabetic)  06/23/2021    Flu vaccine (Season Ended) 09/01/2021    Diabetic microalbuminuria test  04/03/2022    Lipid screen  04/03/2022    Potassium monitoring  04/03/2022    Creatinine monitoring  04/03/2022    DTaP/Tdap/Td vaccine (2 - Td) 08/25/2026    Pneumococcal 0-64 years Vaccine  Completed    Hepatitis C screen  Completed    HIV screen  Completed    Hepatitis A vaccine  Aged Out    Hepatitis B vaccine  Aged Out    Hib vaccine  Aged Out    Meningococcal (ACWY) vaccine  Aged Out             (applicable per patient's age: Cancer Screenings, Depression Screening, Fall Risk Screening, Immunizations)    Hemoglobin A1C   Date Value   03/23/2021 12.8 %   04/13/2016 5.4     Microalb/Crt.  Ratio (mcg/mg creat)   Date Value   04/03/2021 154 (H)     LDL Cholesterol (mg/dL)   Date Value   04/03/2021 116     AST (U/L)   Date Value   03/12/2016 19     ALT (U/L)   Date Value   03/12/2016 21     BUN (mg/dL)   Date Value   04/03/2021 21 (H)      (goal A1C is < 7)   (goal LDL is <100) need 30-50% reduction from baseline     BP Readings from Last 3 Encounters:   03/22/21 118/75   09/16/19 137/88   09/15/19 (!) 157/86    (goal /80)      All Future Testing planned in [facility]:  Lab Frequency Next Occurrence   CT lung screen [Initial/Annual] Once 79/20/4773   Basic Metabolic Panel Once 46/02/7905       Next Visit Date:  Future Appointments   Date Time Provider Amish Giraldo   4/22/2021  4:00 PM Rangel Russell MD [facility]            Patient Active Problem List:     Elevated blood pressure reading     Smoker     Morbid obesity ([address]     Need for vaccination     Right arm numbness     Hypovitaminosis D

## 2021-04-22 ENCOUNTER — OFFICE VISIT (OUTPATIENT)
Dept: FAMILY MEDICINE CLINIC | Age: 51
End: 2021-04-22
Payer: COMMERCIAL

## 2021-04-22 VITALS
WEIGHT: 307.2 LBS | TEMPERATURE: 98.3 F | SYSTOLIC BLOOD PRESSURE: 134 MMHG | HEIGHT: 71 IN | BODY MASS INDEX: 43.01 KG/M2 | HEART RATE: 94 BPM | DIASTOLIC BLOOD PRESSURE: 77 MMHG

## 2021-04-22 DIAGNOSIS — E08.3293 DIABETES MELLITUS DUE TO UNDERLYING CONDITION WITH MILD NONPROLIFERATIVE RETINOPATHY OF BOTH EYES, WITHOUT LONG-TERM CURRENT USE OF INSULIN, MACULAR EDEMA PRESENCE UNSPECIFIED (HCC): Primary | ICD-10-CM

## 2021-04-22 DIAGNOSIS — Z23 NEED FOR PROPHYLACTIC VACCINATION AND INOCULATION AGAINST VARICELLA: ICD-10-CM

## 2021-04-22 DIAGNOSIS — Z12.11 COLON CANCER SCREENING: ICD-10-CM

## 2021-04-22 LAB — HBA1C MFR BLD: 10.5 %

## 2021-04-22 PROCEDURE — 83036 HEMOGLOBIN GLYCOSYLATED A1C: CPT | Performed by: STUDENT IN AN ORGANIZED HEALTH CARE EDUCATION/TRAINING PROGRAM

## 2021-04-22 PROCEDURE — 99213 OFFICE O/P EST LOW 20 MIN: CPT | Performed by: STUDENT IN AN ORGANIZED HEALTH CARE EDUCATION/TRAINING PROGRAM

## 2021-04-22 PROCEDURE — 99211 OFF/OP EST MAY X REQ PHY/QHP: CPT | Performed by: FAMILY MEDICINE

## 2021-04-22 RX ORDER — METFORMIN HYDROCHLORIDE 500 MG/1
1000 TABLET, EXTENDED RELEASE ORAL
Qty: 60 TABLET | Refills: 5 | Status: SHIPPED | OUTPATIENT
Start: 2021-04-22 | End: 2021-04-22

## 2021-04-22 RX ORDER — METFORMIN HYDROCHLORIDE 500 MG/1
1000 TABLET, EXTENDED RELEASE ORAL 2 TIMES DAILY
Qty: 60 TABLET | Refills: 5 | Status: SHIPPED | OUTPATIENT
Start: 2021-04-22 | End: 2021-11-08

## 2021-04-22 ASSESSMENT — ENCOUNTER SYMPTOMS
SINUS PAIN: 0
VOMITING: 0
SINUS PRESSURE: 0
ABDOMINAL DISTENTION: 0
COUGH: 0
COLOR CHANGE: 0
ABDOMINAL PAIN: 0
WHEEZING: 0
SHORTNESS OF BREATH: 0
VOICE CHANGE: 0
NAUSEA: 0

## 2021-04-22 ASSESSMENT — PATIENT HEALTH QUESTIONNAIRE - PHQ9
SUM OF ALL RESPONSES TO PHQ QUESTIONS 1-9: 0
SUM OF ALL RESPONSES TO PHQ9 QUESTIONS 1 & 2: 0
2. FEELING DOWN, DEPRESSED OR HOPELESS: 0

## 2021-04-22 NOTE — PROGRESS NOTES
I have reviewed and discussed key elements of Leslie Darnell with the resident including plan of care and follow up and agree with the care jaida plan.

## 2021-04-22 NOTE — PROGRESS NOTES
DIABETES and HYPERTENSION visit    BP Readings from Last 3 Encounters:   03/22/21 118/75   09/16/19 137/88   09/15/19 (!) 157/86        Hemoglobin A1C   Date Value   03/23/2021 12.8 %   04/13/2016 5.4     Microalb/Crt. Ratio (mcg/mg creat)   Date Value   04/03/2021 154 (H)     LDL Cholesterol (mg/dL)   Date Value   04/03/2021 116     HDL (mg/dL)   Date Value   04/03/2021 43     BUN (mg/dL)   Date Value   04/03/2021 21 (H)     CREATININE (mg/dL)   Date Value   04/03/2021 1.04     Glucose (mg/dL)   Date Value   04/03/2021 159 (H)            Have you changed or started any medications since your last visit including any over-the-counter medicines, vitamins, or herbal medicines? no   Have you stopped taking any of your medications? Is so, why? -  no  Are you having any side effects from any of your medications? - no    Have you seen any other physician or provider since your last visit?  no   Have you had any other diagnostic tests since your last visit?  no   Have you been seen in the emergency room and/or had an admission in a hospital since we last saw you?  no   Have you had your routine dental cleaning in the past 6 months?  no     Have you had your annual diabetic retinal (eye) exam? No   (ensure copy of exam is in the chart)    Do you have an active MyChart account? If no, what is the barrier?   No: code exp    Patient Care Team:  Merrill Ocampo MD as PCP - General (Family Medicine)    Medical History Review  Past Medical, Family, and Social History reviewed and does not contribute to the patient presenting condition    Health Maintenance   Topic Date Due    Diabetic foot exam  Never done    Diabetic retinal exam  Never done    Shingles Vaccine (1 of 2) Never done    Colon cancer screen colonoscopy  Never done    COVID-19 Vaccine (2 - Pfizer 2-dose series) 04/28/2021    A1C test (Diabetic or Prediabetic)  06/23/2021    Flu vaccine (Season Ended) 09/01/2021    Diabetic microalbuminuria test  04/03/2022

## 2021-04-22 NOTE — PROGRESS NOTES
Subjective:    Shannan Martinez is a 45 y.o. male with  has a past medical history of Essential hypertension, Morbid obesity ([address] and Tobacco abuse. Presented to the office today for:  Chief Complaint   Patient presents with    Diabetes     1 month follow up    Discuss Medications     pen needles     Health Maintenance     cologuard pended, covid had first does, shinlges pended, no DM eye exam        HPI    Patient is a 58-year-old male presenting for follow-up of his diabetes. 4 weeks ago patient was started on Metformin thousand twice daily and Victoza injections daily for his newly diagnosed diabetes. Patient has been tolerating medications well. He is currently documenting fasting blood glucose which are in the range of 100-150. Patient is complaining of mild GI side effects with Metformin. Patient is also started multiple lifestyle modifications to help with his diabetes. No other complaints at this time. Review of Systems   Constitutional: Negative for fatigue, fever and unexpected weight change. HENT: Negative for sinus pressure, sinus pain and voice change. Eyes: Negative for visual disturbance. Respiratory: Negative for cough, shortness of breath and wheezing. Cardiovascular: Negative for chest pain, palpitations and leg swelling. Gastrointestinal: Negative for abdominal distention, abdominal pain, nausea and vomiting. Endocrine: Negative for polydipsia, polyphagia and polyuria. Genitourinary: Negative for difficulty urinating, flank pain and frequency. Skin: Negative for color change, rash and wound. Neurological: Negative for dizziness, light-headedness, numbness and headaches. Psychiatric/Behavioral: Negative for confusion and decreased concentration. The patient is not nervous/anxious.                   The patient has a   Family History   Problem Relation Age of Onset    Heart Disease Mother     High Blood Pressure Mother     Diabetes Mother     Cancer Father current use of insulin, macular edema presence unspecified (HCC)  - POCT glycosylated hemoglobin (Hb A1C) 12.8->10.5  -We will change Metformin to XR 1000 twice daily for better side effect profile. Continue with Victoza. - metFORMIN (GLUCOPHAGE-XR) 500 MG extended release tablet; Take 2 tablets by mouth twice daily Dispense: 60 tablet; Refill: 5  -Return to clinic in 1 month to go over home blood glucose readings and adjust medications if needed. 2. Need for prophylactic vaccination and inoculation against varicella  - zoster recombinant adjuvanted vaccine [facility]) 50 MCG/0.5ML SUSR injection; Inject 0.5 mLs into the muscle once for 1 dose 50 MCG IM then repeat 2-6 months. Dispense: 1 each; Refill: 1    3. Colon cancer screening  - Cologuard (For External Results Only); Future          Requested Prescriptions     Signed Prescriptions Disp Refills    zoster recombinant adjuvanted vaccine (SHINGRIX) 50 MCG/0.5ML SUSR injection 1 each 1     Sig: Inject 0.5 mLs into the muscle once for 1 dose 50 MCG IM then repeat 2-6 months.  metFORMIN (GLUCOPHAGE-XR) 500 MG extended release tablet 60 tablet 5     Sig: Take 2 tablets by mouth daily (with breakfast)       Medications Discontinued During This Encounter   Medication Reason    metFORMIN (GLUCOPHAGE) 1000 MG tablet Therapy completed       Arnaldo received counseling on the following healthy behaviors: nutrition, exercise and medication adherence    Discussed use,benefit, and side effects of prescribed medications. Barriers to medication compliance addressed. All patient questions answered. Pt voiced understanding. Return in about 4 weeks (around 5/20/2021) for DMT2. Disclaimer: Some orall of this note was transcribed using voice-recognition software. This may cause typographical errors occasionally.  Although all effort is made to fix these errors, please do not hesitate to contact our office if there isany concern with the understanding of this note.

## 2021-05-18 ENCOUNTER — TELEPHONE (OUTPATIENT)
Dept: FAMILY MEDICINE CLINIC | Age: 51
End: 2021-05-18

## 2021-05-18 DIAGNOSIS — I10 ESSENTIAL HYPERTENSION: ICD-10-CM

## 2021-05-18 DIAGNOSIS — Z12.11 COLON CANCER SCREENING: ICD-10-CM

## 2021-05-18 RX ORDER — LISINOPRIL 20 MG/1
TABLET ORAL
Qty: 30 TABLET | Refills: 0 | Status: SHIPPED | OUTPATIENT
Start: 2021-05-18 | End: 2021-06-17

## 2021-05-18 NOTE — TELEPHONE ENCOUNTER
E-scribe request for lisinopril. Please review and e-scribe if applicable. Last Visit Date:  04/22/2021  Next Visit Date:  6/4/2021    Hemoglobin A1C   Date Value   04/22/2021 10.5 %   03/23/2021 12.8 %   04/13/2016 5.4             ( goal A1C is < 7)   Microalb/Crt.  Ratio (mcg/mg creat)   Date Value   04/03/2021 154 (H)     LDL Cholesterol (mg/dL)   Date Value   04/03/2021 116       (goal LDL is <100)   AST (U/L)   Date Value   03/12/2016 19     ALT (U/L)   Date Value   03/12/2016 21     BUN (mg/dL)   Date Value   04/03/2021 21 (H)     BP Readings from Last 3 Encounters:   04/22/21 134/77   03/22/21 118/75   09/16/19 137/88          (goal 120/80)        Patient Active Problem List:     Elevated blood pressure reading     Smoker     Morbid obesity ([address]     Need for vaccination     Right arm numbness     Hypovitaminosis D     Tobacco abuse     Essential hypertension     Smoking      ----[place]

## 2021-06-04 ENCOUNTER — OFFICE VISIT (OUTPATIENT)
Dept: FAMILY MEDICINE CLINIC | Age: 51
End: 2021-06-04
Payer: COMMERCIAL

## 2021-06-04 VITALS
SYSTOLIC BLOOD PRESSURE: 122 MMHG | BODY MASS INDEX: 40.68 KG/M2 | DIASTOLIC BLOOD PRESSURE: 76 MMHG | HEIGHT: 71 IN | HEART RATE: 91 BPM | WEIGHT: 290.6 LBS

## 2021-06-04 DIAGNOSIS — E08.3293 DIABETES MELLITUS DUE TO UNDERLYING CONDITION WITH MILD NONPROLIFERATIVE RETINOPATHY OF BOTH EYES, WITHOUT LONG-TERM CURRENT USE OF INSULIN, MACULAR EDEMA PRESENCE UNSPECIFIED (HCC): Primary | ICD-10-CM

## 2021-06-04 DIAGNOSIS — E66.01 MORBID OBESITY (HCC): ICD-10-CM

## 2021-06-04 DIAGNOSIS — I10 ESSENTIAL HYPERTENSION: ICD-10-CM

## 2021-06-04 LAB — HBA1C MFR BLD: 7.6 %

## 2021-06-04 PROCEDURE — 83036 HEMOGLOBIN GLYCOSYLATED A1C: CPT

## 2021-06-04 PROCEDURE — 99213 OFFICE O/P EST LOW 20 MIN: CPT

## 2021-06-04 ASSESSMENT — ENCOUNTER SYMPTOMS
CONSTIPATION: 0
WHEEZING: 0
ABDOMINAL PAIN: 0
SHORTNESS OF BREATH: 0
COUGH: 0
VOMITING: 0
DIARRHEA: 0
PHOTOPHOBIA: 0

## 2021-06-04 NOTE — PROGRESS NOTES
Visit Information    Have you changed or started any medications since your last visit including any over-the-counter medicines, vitamins, or herbal medicines? no   Are you having any side effects from any of your medications? -  no  Have you stopped taking any of your medications? Is so, why? -  no    Have you seen any other physician or provider since your last visit? No  Have you had any other diagnostic tests since your last visit? No  Have you been seen in the emergency room and/or had an admission to a hospital since we last saw you? No  Have you had your routine dental cleaning in the past 6 months? no    Have you activated your [facility] account? If not, what are your barriers?  No: pending     Patient Care Team:  Maeve Fajardo MD as PCP - General (Family Medicine)    Medical History Review  Past Medical, Family, and Social History reviewed and does not contribute to the patient presenting condition    Health Maintenance   Topic Date Due    Diabetic foot exam  Never done    Diabetic retinal exam  Never done    Hepatitis B vaccine (1 of 3 - Risk 3-dose series) Never done    Shingles Vaccine (1 of 2) Never done    COVID-19 Vaccine (2 - Pfizer 2-dose series) 04/28/2021    A1C test (Diabetic or Prediabetic)  07/22/2021    Flu vaccine (Season Ended) 09/01/2021    Diabetic microalbuminuria test  04/03/2022    Lipid screen  04/03/2022    Potassium monitoring  04/03/2022    Creatinine monitoring  04/03/2022    Colon cancer screen fecal DNA test (Cologuard)  05/10/2024    DTaP/Tdap/Td vaccine (2 - Td or Tdap) 08/25/2026    Pneumococcal 0-64 years Vaccine (2 of 2) 08/23/2034    Hepatitis C screen  Completed    HIV screen  Completed    Hepatitis A vaccine  Aged Out    Hib vaccine  Aged Out    Meningococcal (ACWY) vaccine  Aged Out

## 2021-06-04 NOTE — PROGRESS NOTES
Subjective:    Manny Garcia is a 45 y.o. male with  has a past medical history of Essential hypertension, Morbid obesity ([address] and Tobacco abuse. Presented to the office today for:  Chief Complaint   Patient presents with    Diabetes     follow up       HPI     79-year-old male came to the clinic to follow-up for diabetes and get hemoglobin A1c checked today  Patient currently takes Metformin 1000 mg BID  0.6 mg daily injections of Victoza  Hba1c 7.6 from 10 from 12  Lost 16 lbs from last time  BMI 40.53   Diabetic foot exam was normal today  Diabetic retinal exam will be seeing Opthalmology  Patient denies signs and symptoms of uncontrolled diabetes such as numbness and tingling in the extremities, polyuria polyphagia polydipsia no hypoglycemic episodes reported either    HTN  Lisinopril 20 mg po daily  /76  No headaches, blurry vision, chest pain, dizziness or other symptoms        Review of Systems   Constitutional: Negative for activity change, appetite change and fatigue. Eyes: Negative for photophobia and visual disturbance. Respiratory: Negative for cough, shortness of breath and wheezing. Cardiovascular: Negative for chest pain, palpitations and leg swelling. Gastrointestinal: Negative for abdominal pain, constipation, diarrhea and vomiting. Endocrine: Negative for polyphagia and polyuria. Genitourinary: Negative for dysuria and urgency. Neurological: Negative for dizziness, tremors and weakness. Psychiatric/Behavioral: Negative for agitation, behavioral problems, confusion, decreased concentration and dysphoric mood.                  The patient has a   Family History   Problem Relation Age of Onset    Heart Disease Mother     High Blood Pressure Mother     Diabetes Mother     Cancer Father        Objective:    /76 (Site: Left Upper Arm, Position: Sitting, Cuff Size: Medium Adult)   Pulse 91   Ht 5' 11\" (1.803 m)   Wt 290 lb 9.6 oz (131.8 kg)   BMI 40.53 kg/m² BP Readings from Last 3 Encounters:   06/04/21 122/76   04/22/21 134/77   03/22/21 118/75       Physical Exam  Constitutional:       General: He is not in acute distress. Appearance: Normal appearance. He is obese. He is not ill-appearing or diaphoretic. Cardiovascular:      Rate and Rhythm: Normal rate and regular rhythm. Pulses: Normal pulses. Heart sounds: Normal heart sounds. No murmur heard. No gallop. Pulmonary:      Effort: Pulmonary effort is normal.      Comments: Slight expiratory wheezes heard bilaterally but no shortness of breath ported by the patient  Abdominal:      General: Abdomen is flat. There is no distension. Palpations: Abdomen is soft. Tenderness: There is no abdominal tenderness. Musculoskeletal:         General: No swelling or tenderness. Normal range of motion. Right lower leg: No edema. Left lower leg: No edema. Skin:     General: Skin is warm. Findings: No erythema. Neurological:      Mental Status: He is alert and oriented to person, place, and time. Sensory: No sensory deficit. Comments: Diabetic foot exam was normal bilaterally    Psychiatric:         Mood and Affect: Mood normal.         Behavior: Behavior normal.         Thought Content: Thought content normal.         Judgment: Judgment normal.         Lab Results   Component Value Date    WBC 6.3 03/12/2016    HGB 15.9 03/12/2016    HCT 47.0 03/12/2016     03/12/2016    CHOL 216 (H) 03/12/2016    TRIG 84 03/12/2016    HDL 43 04/03/2021    ALT 21 03/12/2016    AST 19 03/12/2016     (L) 04/03/2021    K 4.6 04/03/2021    CL 99 04/03/2021    CREATININE 1.04 04/03/2021    BUN 21 (H) 04/03/2021    CO2 24 04/03/2021    TSH 1.73 03/12/2016    LABA1C 7.6 (A) 06/04/2021    LABMICR 154 (H) 04/03/2021     Lab Results   Component Value Date    CALCIUM 9.4 04/03/2021     Lab Results   Component Value Date    LDLCHOLESTEROL 116 04/03/2021       Assessment and Plan:    1. 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 Arnaldo received counseling on the following healthy behaviors: nutrition, exercise and medication adherence    Discussed use,benefit, and side effects of prescribed medications. Barriers to medication compliance addressed. All patient questions answered. Pt voiced understanding. No follow-ups on file. Disclaimer: Some orall of this note was transcribed using voice-recognition software. This may cause typographical errors occasionally. Although all effort is made to fix these errors, please do not hesitate to contact our office if there Stewart Johnson concern with the understanding of this note.

## 2021-06-17 DIAGNOSIS — I10 ESSENTIAL HYPERTENSION: ICD-10-CM

## 2021-06-17 RX ORDER — LISINOPRIL 20 MG/1
TABLET ORAL
Qty: 30 TABLET | Refills: 0 | Status: SHIPPED | OUTPATIENT
Start: 2021-06-17 | End: 2021-07-16

## 2021-06-17 NOTE — TELEPHONE ENCOUNTER
E-scribe request for lisinopril. Please review and e-scribe if applicable. Last Visit Date:  06/04/2021  Next Visit Date:  7/6/2021    Hemoglobin A1C (%)   Date Value   06/04/2021 7.6 (A)   04/22/2021 10.5   03/23/2021 12.8             ( goal A1C is < 7)   Microalb/Crt.  Ratio (mcg/mg creat)   Date Value   04/03/2021 154 (H)     LDL Cholesterol (mg/dL)   Date Value   04/03/2021 116       (goal LDL is <100)   AST (U/L)   Date Value   03/12/2016 19     ALT (U/L)   Date Value   03/12/2016 21     BUN (mg/dL)   Date Value   04/03/2021 21 (H)     BP Readings from Last 3 Encounters:   06/04/21 122/76   04/22/21 134/77   03/22/21 118/75          (goal 120/80)        Patient Active Problem List:     Elevated blood pressure reading     Smoker     Morbid obesity ([address]     Need for vaccination     Right arm numbness     Hypovitaminosis D     Tobacco abuse     Essential hypertension     Smoking     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

## 2021-07-06 ENCOUNTER — OFFICE VISIT (OUTPATIENT)
Dept: FAMILY MEDICINE CLINIC | Age: 51
End: 2021-07-06
Payer: COMMERCIAL

## 2021-07-06 VITALS
HEIGHT: 71 IN | WEIGHT: 290 LBS | SYSTOLIC BLOOD PRESSURE: 136 MMHG | HEART RATE: 101 BPM | DIASTOLIC BLOOD PRESSURE: 79 MMHG | BODY MASS INDEX: 40.6 KG/M2

## 2021-07-06 DIAGNOSIS — L98.9 SKIN LESION: ICD-10-CM

## 2021-07-06 DIAGNOSIS — I10 ESSENTIAL HYPERTENSION: ICD-10-CM

## 2021-07-06 DIAGNOSIS — E08.3293 DIABETES MELLITUS DUE TO UNDERLYING CONDITION WITH MILD NONPROLIFERATIVE RETINOPATHY OF BOTH EYES, WITHOUT LONG-TERM CURRENT USE OF INSULIN, MACULAR EDEMA PRESENCE UNSPECIFIED (HCC): ICD-10-CM

## 2021-07-06 DIAGNOSIS — E66.01 MORBID OBESITY (HCC): Primary | ICD-10-CM

## 2021-07-06 LAB — HBA1C MFR BLD: 6.2 %

## 2021-07-06 PROCEDURE — 99214 OFFICE O/P EST MOD 30 MIN: CPT

## 2021-07-06 PROCEDURE — 83036 HEMOGLOBIN GLYCOSYLATED A1C: CPT

## 2021-07-06 ASSESSMENT — ENCOUNTER SYMPTOMS
PHOTOPHOBIA: 0
DIARRHEA: 0
COLOR CHANGE: 0
WHEEZING: 0
VOMITING: 0
ABDOMINAL DISTENTION: 0
CONSTIPATION: 0
NAUSEA: 0
COUGH: 0
APNEA: 0
SHORTNESS OF BREATH: 0

## 2021-07-06 NOTE — PROGRESS NOTES
Attending Physician Statement    Wt Readings from Last 3 Encounters:   07/06/21 290 lb (131.5 kg)   06/04/21 290 lb 9.6 oz (131.8 kg)   04/22/21 (!) 307 lb 3.2 oz (139.3 kg)     Temp Readings from Last 3 Encounters:   04/22/21 98.3 °F (36.8 °C) (Oral)   03/22/21 98.2 °F (36.8 °C) (Temporal)   09/15/19 98 °F (36.7 °C) (Oral)     BP Readings from Last 3 Encounters:   07/06/21 136/79   06/04/21 122/76   04/22/21 134/77     Pulse Readings from Last 3 Encounters:   07/06/21 101   06/04/21 91   04/22/21 94       I have discussed the care of Kaylene Diaz  including pertinent history and exam findings,  with the resident. I have seen and examined the patient and the key elements of all parts of the encounter have been performed by me. I agree with the assessment, plan and orders as documented by the resident.   (GC Modifier)

## 2021-07-06 NOTE — PROGRESS NOTES
Subjective:    Nico Vega is a 45 y.o. male with  has a past medical history of Essential hypertension, Morbid obesity ([address] and Tobacco abuse. Presented to the office today for:  Chief Complaint   Patient presents with    Diabetes     follow up, A1C check       HPI    48-year-old male came to the clinic to follow-up for diabetes and get  hemoglobin A1c checked today and was 6.3  Previous hba1c levels were7.6 from 10 from 12  Patient currently takes Metformin 1000 mg BID  0.6 mg daily injections of Victoza  Needs Diabetic retinal exam, will be seeing Opthalmology  Patient denies signs and symptoms of uncontrolled diabetes such as numbness and tingling in the extremities, polyuria polyphagia polydipsia no hypoglycemic episodes reported either     HTN  Controlled  /79  Lisinopril 20 mg po daily  No headaches, blurry vision, chest pain, dizziness or other symptoms    Obesity  BMI 40.53  Lost 16 lbs from April until now, weight stable at 290 since last month    Skin mole on left side of face size   [id number]      Review of Systems   Constitutional: Negative for activity change, appetite change, fatigue, fever and unexpected weight change. HENT: Negative for congestion. Eyes: Negative for photophobia and visual disturbance. Respiratory: Negative for apnea, cough, shortness of breath and wheezing. Cardiovascular: Negative for chest pain, palpitations and leg swelling. Gastrointestinal: Negative for abdominal distention, constipation, diarrhea, nausea and vomiting. Endocrine: Negative for polyuria. Genitourinary: Negative for dysuria and urgency. Skin: Negative for color change, pallor, rash and wound. Neurological: Negative for dizziness, tremors, weakness, light-headedness and headaches. Psychiatric/Behavioral: Negative for agitation, behavioral problems, confusion and decreased concentration.                  The patient has a   Family History   Problem Relation Age of Onset    Heart Disease Mother     High Blood Pressure Mother     Diabetes Mother     Cancer Father        Objective:    /79 (Site: Left Upper Arm, Position: Sitting, Cuff Size: Medium Adult)   Pulse 101   Ht 5' 11\" (1.803 m)   Wt 290 lb (131.5 kg)   BMI 40.45 kg/m²    BP Readings from Last 3 Encounters:   07/06/21 136/79   06/04/21 122/76   04/22/21 134/77       Physical Exam  Constitutional:       General: He is not in acute distress. Appearance: Normal appearance. He is obese. He is not ill-appearing, toxic-appearing or diaphoretic. HENT:      Head: Normocephalic and atraumatic. Nose: Nose normal. No congestion. Eyes:      General: No scleral icterus. Right eye: No discharge. Left eye: No discharge. Extraocular Movements: Extraocular movements intact. Conjunctiva/sclera: Conjunctivae normal.      Pupils: Pupils are equal, round, and reactive to light. Cardiovascular:      Rate and Rhythm: Normal rate and regular rhythm. Pulses: Normal pulses. Heart sounds: No murmur heard. Pulmonary:      Effort: Pulmonary effort is normal.      Breath sounds: Normal breath sounds. No wheezing. Abdominal:      General: There is no distension. Tenderness: There is no abdominal tenderness. Musculoskeletal:         General: No swelling or tenderness. Normal range of motion. Right lower leg: No edema. Left lower leg: No edema. Skin:     General: Skin is warm. Coloration: Skin is not jaundiced. Findings: Lesion present. No erythema. Comments: Left-sided under the eye darkened discoloration with smooth borders and blood vessels   Neurological:      Mental Status: He is alert and oriented to person, place, and time. Motor: No weakness. Psychiatric:         Mood and Affect: Mood normal.         Behavior: Behavior normal.         Thought Content:  Thought content normal.         Judgment: Judgment normal.         Lab Results   Component Value Date    WBC 6.3 03/12/2016    HGB 15.9 03/12/2016    HCT 47.0 03/12/2016     03/12/2016    CHOL 216 (H) 03/12/2016    TRIG 84 03/12/2016    HDL 43 04/03/2021    ALT 21 03/12/2016    AST 19 03/12/2016     (L) 04/03/2021    K 4.6 04/03/2021    CL 99 04/03/2021    CREATININE 1.04 04/03/2021    BUN 21 (H) 04/03/2021    CO2 24 04/03/2021    TSH 1.73 03/12/2016    LABA1C 6.2 (A) 07/06/2021    LABMICR 154 (H) 04/03/2021     Lab Results   Component Value Date    CALCIUM 9.4 04/03/2021     Lab Results   Component Value Date    LDLCHOLESTEROL 116 04/03/2021       Assessment and Plan:    1. Morbid obesity ([address]  Losing weight consistently  Low 16 pound since April    2. 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 Essential hypertension  Controlled and normal signs and symptoms of uncontrolled hypertension  On lisinopril 20 mg p.o. daily    4. Skin lesion  -Concern for basal cell carcinoma versus other skin lesions  People stays out in the sun quite a lot  - ELIZABETH - Srikanth Flynn MD, Dermatology, [facility] Prescriptions      No prescriptions requested or ordered in this encounter       There are no discontinued medications. Arnaldo received counseling on the following healthy behaviors: nutrition, exercise and medication adherence    Discussed use,benefit, and side effects of prescribed medications. Barriers to medication compliance addressed. All patient questions answered. Pt voiced understanding. No follow-ups on file. Disclaimer: Some orall of this note was transcribed using voice-recognition software. This may cause typographical errors occasionally. Although all effort is made to fix these errors, please do not hesitate to contact our office if there Girish Hudson concern with the understanding of this note.

## 2021-07-16 DIAGNOSIS — I10 ESSENTIAL HYPERTENSION: ICD-10-CM

## 2021-07-16 RX ORDER — LISINOPRIL 20 MG/1
TABLET ORAL
Qty: 30 TABLET | Refills: 0 | Status: SHIPPED | OUTPATIENT
Start: 2021-07-16 | End: 2021-08-16

## 2021-08-16 DIAGNOSIS — I10 ESSENTIAL HYPERTENSION: ICD-10-CM

## 2021-08-16 RX ORDER — LISINOPRIL 20 MG/1
TABLET ORAL
Qty: 30 TABLET | Refills: 0 | Status: SHIPPED | OUTPATIENT
Start: 2021-08-16 | End: 2021-09-14

## 2021-08-16 NOTE — TELEPHONE ENCOUNTER
E-scribe request for lisinopril. Please review and e-scribe if applicable. Last Visit Date:  07/06/2021  Next Visit Date:  Visit date not found    Hemoglobin A1C (%)   Date Value   07/06/2021 6.2 (A)   06/04/2021 7.6 (A)   04/22/2021 10.5             ( goal A1C is < 7)   Microalb/Crt.  Ratio (mcg/mg creat)   Date Value   04/03/2021 154 (H)     LDL Cholesterol (mg/dL)   Date Value   04/03/2021 116       (goal LDL is <100)   AST (U/L)   Date Value   03/12/2016 19     ALT (U/L)   Date Value   03/12/2016 21     BUN (mg/dL)   Date Value   04/03/2021 21 (H)     BP Readings from Last 3 Encounters:   07/06/21 136/79   06/04/21 122/76   04/22/21 134/77          (goal 120/80)        Patient Active Problem List:     Elevated blood pressure reading     Smoker     Morbid obesity ([address]     Need for vaccination     Right arm numbness     Hypovitaminosis D     Tobacco abuse     Essential hypertension     Smoking     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

## 2021-09-14 DIAGNOSIS — I10 ESSENTIAL HYPERTENSION: ICD-10-CM

## 2021-09-14 RX ORDER — LISINOPRIL 20 MG/1
TABLET ORAL
Qty: 30 TABLET | Refills: 0 | Status: SHIPPED | OUTPATIENT
Start: 2021-09-14 | End: 2021-10-12

## 2021-09-14 NOTE — TELEPHONE ENCOUNTER
E-scribe request for lisinopril. Please review and e-scribe if applicable. Last Visit Date:  07/06/2021  Next Visit Date:  Visit date not found    Hemoglobin A1C (%)   Date Value   07/06/2021 6.2 (A)   06/04/2021 7.6 (A)   04/22/2021 10.5             ( goal A1C is < 7)   Microalb/Crt.  Ratio (mcg/mg creat)   Date Value   04/03/2021 154 (H)     LDL Cholesterol (mg/dL)   Date Value   04/03/2021 116       (goal LDL is <100)   AST (U/L)   Date Value   03/12/2016 19     ALT (U/L)   Date Value   03/12/2016 21     BUN (mg/dL)   Date Value   04/03/2021 21 (H)     BP Readings from Last 3 Encounters:   07/06/21 136/79   06/04/21 122/76   04/22/21 134/77          (goal 120/80)        Patient Active Problem List:     Elevated blood pressure reading     Smoker     Morbid obesity ([address]     Need for vaccination     Right arm numbness     Hypovitaminosis D     Tobacco abuse     Essential hypertension     Smoking     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

## 2021-09-19 DIAGNOSIS — E08.3293 DIABETES MELLITUS DUE TO UNDERLYING CONDITION WITH MILD NONPROLIFERATIVE RETINOPATHY OF BOTH EYES, WITHOUT LONG-TERM CURRENT USE OF INSULIN, MACULAR EDEMA PRESENCE UNSPECIFIED (HCC): ICD-10-CM

## 2021-09-20 RX ORDER — LIRAGLUTIDE 6 MG/ML
INJECTION SUBCUTANEOUS
Qty: 6 ML | Refills: 0 | Status: SHIPPED | OUTPATIENT
Start: 2021-09-20 | End: 2021-12-09

## 2021-09-20 NOTE — TELEPHONE ENCOUNTER
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

## 2021-09-23 RX ORDER — ASPIRIN 81 MG/1
TABLET, COATED ORAL
Qty: 90 TABLET | Refills: 1 | Status: SHIPPED | OUTPATIENT
Start: 2021-09-23 | End: 2022-02-15 | Stop reason: SDUPTHER

## 2021-09-23 NOTE — TELEPHONE ENCOUNTER
Aspirin pending for refill     Health Maintenance   Topic Date Due    Diabetic foot exam  Never done    Diabetic retinal exam  Never done    Hepatitis B vaccine (1 of 3 - Risk 3-dose series) Never done    COVID-19 Vaccine (2 - Pfizer 2-dose series) 04/28/2021    Shingles Vaccine (2 of 2) 08/21/2021    Flu vaccine (1) 09/01/2021    Diabetic microalbuminuria test  04/03/2022    Lipid screen  04/03/2022    Potassium monitoring  04/03/2022    Creatinine monitoring  04/03/2022    A1C test (Diabetic or Prediabetic)  07/06/2022    Colon cancer screen fecal DNA test (Cologuard)  05/10/2024    DTaP/Tdap/Td vaccine (2 - Td or Tdap) 08/25/2026    Pneumococcal 0-64 years Vaccine (2 of 2 - PPSV23) 08/23/2034    Hepatitis C screen  Completed    HIV screen  Completed    Hepatitis A vaccine  Aged Out    Hib vaccine  Aged Out    Meningococcal (ACWY) vaccine  Aged Out             (applicable per patient's age: Cancer Screenings, Depression Screening, Fall Risk Screening, Immunizations)    Hemoglobin A1C (%)   Date Value   07/06/2021 6.2 (A)   06/04/2021 7.6 (A)   04/22/2021 10.5     Microalb/Crt. Ratio (mcg/mg creat)   Date Value   04/03/2021 154 (H)     LDL Cholesterol (mg/dL)   Date Value   04/03/2021 116     AST (U/L)   Date Value   03/12/2016 19     ALT (U/L)   Date Value   03/12/2016 21     BUN (mg/dL)   Date Value   04/03/2021 21 (H)      (goal A1C is < 7)   (goal LDL is <100) need 30-50% reduction from baseline     BP Readings from Last 3 Encounters:   07/06/21 136/79   06/04/21 122/76   04/22/21 134/77    (goal /80)      All Future Testing planned in [facility]:  Lab Frequency Next Occurrence   CT lung screen [Initial/Annual] Once 58/43/3534   Basic Metabolic Panel Once 07/58/6624       Next Visit Date:  No future appointments.          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

## 2021-09-23 NOTE — TELEPHONE ENCOUNTER
Patient ASCVD is 8.5 per last calculation. At this point patient should be reevaluated if there is any need of aspirin, patient needs an appointment for discussion of medication.

## 2021-10-26 ENCOUNTER — OFFICE VISIT (OUTPATIENT)
Dept: FAMILY MEDICINE CLINIC | Age: 51
End: 2021-10-26
Payer: COMMERCIAL

## 2021-10-26 VITALS
WEIGHT: 299 LBS | DIASTOLIC BLOOD PRESSURE: 81 MMHG | HEART RATE: 83 BPM | SYSTOLIC BLOOD PRESSURE: 130 MMHG | BODY MASS INDEX: 41.7 KG/M2 | TEMPERATURE: 98.1 F

## 2021-10-26 DIAGNOSIS — F17.200 SMOKER: ICD-10-CM

## 2021-10-26 DIAGNOSIS — I10 ESSENTIAL HYPERTENSION: ICD-10-CM

## 2021-10-26 DIAGNOSIS — E08.3293 DIABETES MELLITUS DUE TO UNDERLYING CONDITION WITH MILD NONPROLIFERATIVE RETINOPATHY OF BOTH EYES, WITHOUT LONG-TERM CURRENT USE OF INSULIN, MACULAR EDEMA PRESENCE UNSPECIFIED (HCC): Primary | ICD-10-CM

## 2021-10-26 DIAGNOSIS — E66.01 MORBID OBESITY (HCC): ICD-10-CM

## 2021-10-26 PROCEDURE — 99211 OFF/OP EST MAY X REQ PHY/QHP: CPT

## 2021-10-26 PROCEDURE — 99213 OFFICE O/P EST LOW 20 MIN: CPT

## 2021-10-26 PROCEDURE — 83036 HEMOGLOBIN GLYCOSYLATED A1C: CPT

## 2021-10-26 ASSESSMENT — ENCOUNTER SYMPTOMS
VOMITING: 0
COLOR CHANGE: 0
ABDOMINAL DISTENTION: 0
NAUSEA: 0
APNEA: 0
PHOTOPHOBIA: 0
WHEEZING: 0
SHORTNESS OF BREATH: 0
DIARRHEA: 0
CONSTIPATION: 0
COUGH: 0

## 2021-10-26 NOTE — PROGRESS NOTES
03/12/2016    TRIG 84 03/12/2016    HDL 43 04/03/2021    ALT 21 03/12/2016    AST 19 03/12/2016     (L) 04/03/2021    K 4.6 04/03/2021    CL 99 04/03/2021    CREATININE 1.04 04/03/2021    BUN 21 (H) 04/03/2021    CO2 24 04/03/2021    TSH 1.73 03/12/2016    LABA1C 6.2 (A) 07/06/2021    LABMICR 154 (H) 04/03/2021     Lab Results   Component Value Date    CALCIUM 9.4 04/03/2021     Lab Results   Component Value Date    LDLCHOLESTEROL 116 04/03/2021       Assessment and Plan:    1. 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 Essential hypertension  Pressure 130/81  No signs and symptoms of uncontrolled hypertension such as dizziness, headaches, chest pain or shortness of breath  On lisinopril    3. Morbid obesity ([address]  Patient gained about 9 pounds since last him I saw him  Discussed weight loss techniques and importance    4. Smoker  Patient is not currently ready to quit smoking          Requested Prescriptions      No prescriptions requested or ordered in this encounter       There are no discontinued medications. Arnaldo received counseling on the following healthy behaviors: nutrition, exercise and medication adherence    Discussed use,benefit, and side effects of prescribed medications. Barriers to medication compliance addressed. All patient questions answered. Pt voiced understanding. Return in about 3 months (around 1/26/2022) for Hba1c . Disclaimer: Some orall of this note was transcribed using voice-recognition software. This may cause typographical errors occasionally.  Although all effort is made to fix these errors, please do not hesitate to contact our office if there isany concern with the understanding of this note.

## 2021-10-26 NOTE — PATIENT INSTRUCTIONS
Thank you for letting us take care of you today. We hope all your questions were addressed. If a question was overlooked or something else comes to mind after you return home, please contact a member of your Care Team listed below. Your Care Team at [place] [address] is Team #4  Azael Lu MD (Faculty)  Allen Izquierdo MD (Resident)  Jaime Montemayor MD (Resident)  Isabelle Godfrey MD (Resident)  Pascale Weston MD (Resident)  DIO Cardenas RMA [place] [facility] office)  Bethany Daniel, [address] (Clinical Practice Manager)  Shanta Breaux [facility] (Clinical Pharmacist)       Office phone number: [phone]    If you need to get in right away due to illness, please be advised we have \"Same Day\" appointments available Monday-Friday. Please call us at [phone] option #3 to schedule your \"Same Day\" appointment. Schedule a Vaccine  When you qualify to receive the vaccine, call the [facility]) COVID-19 Vaccination Hotline to schedule your appointment or to get additional information about the [facility]) locations which are offering the COVID-19 vaccine. To be 94% effective, it's important that you receive two doses of one of the COVID-19 vaccines. -If you are receiving the [place] vaccine, your second shot will be scheduled as close to 21 days after the first shot as possible. -If you are receiving the Moderna vaccine, your second shot will be scheduled as close to 28 days after the first shot as possible. [facility]) COVID-19 Vaccination Hotline: 675.766.9385    Links to [facility]) website and [facility] website:    [email or web address]. [email or web address]    [email or web address]

## 2021-10-26 NOTE — PROGRESS NOTES
Diabetic visit information    BP Readings from Last 3 Encounters:   07/06/21 136/79   06/04/21 122/76   04/22/21 134/77       Hemoglobin A1C (%)   Date Value   07/06/2021 6.2 (A)   06/04/2021 7.6 (A)   04/22/2021 10.5     Microalb/Crt. Ratio (mcg/mg creat)   Date Value   04/03/2021 154 (H)     LDL Cholesterol (mg/dL)   Date Value   04/03/2021 116               Have you changed or started any medications since your last visit including any over-the-counter medicines, vitamins, or herbal medicines? no   Have you stopped taking any of your medications? Is so, why? -  no  Are you having any side effects from any of your medications? - no    Have you seen any other physician or provider since your last visit?  no   Have you had any other diagnostic tests since your last visit?  no   Have you been seen in the emergency room and/or had an admission in a hospital since we last saw you?  no     Have you had your annual diabetic retinal (eye) exam? No   (ensure copy of exam is in the chart)    Have you had your routine dental cleaning in the past 6 months? no    Do you have an active MyChart account? If not, what are your barriers? No:     Patient Care Team:  Desiree Camp MD as PCP - General (Family Medicine)  Denisha Denton MD as PCP - [facility] Provider    Medical history Review  Past Medical, Family, and Social History reviewed and does not contribute to the patient presenting condition.     Health Maintenance   Topic Date Due    Diabetic foot exam  Never done    Diabetic retinal exam  Never done    Hepatitis B vaccine (1 of 3 - Risk 3-dose series) Never done    COVID-19 Vaccine (2 - Pfizer 3-dose booster series) 04/28/2021    Flu vaccine (1) 09/01/2021    Diabetic microalbuminuria test  04/03/2022    Lipid screen  04/03/2022    Potassium monitoring  04/03/2022    Creatinine monitoring  04/03/2022    A1C test (Diabetic or Prediabetic)  07/06/2022    Colon cancer screen fecal DNA test (Cologuard) 05/10/2024    DTaP/Tdap/Td vaccine (2 - Td or Tdap) 08/25/2026    Pneumococcal 0-64 years Vaccine (2 of 2 - PPSV23) 08/23/2034    Shingles Vaccine  Completed    Hepatitis C screen  Completed    HIV screen  Completed    Hepatitis A vaccine  Aged Out    Hib vaccine  Aged Out    Meningococcal (ACWY) vaccine  Aged Out

## 2021-10-26 NOTE — PROGRESS NOTES
Attending Physician Statement  I have discussed the care of Arnaldo Ornelas 45 y.o. male, including pertinent history and exam findings, with the resident Dr. Lincoln Fitzgerald MD.    History and Exam:   Chief Complaint   Patient presents with    Check-Up     patient states he is doing well. no issues    Health Maintenance     patient refused flu shot       Past Medical History:   Diagnosis Date    Essential hypertension 8/25/2016    Morbid obesity ([address] 2/26/2016    Tobacco abuse      No Known Allergies   I have seen and examined the patient and the key elements of the encounter have been performed by me. BP Readings from Last 3 Encounters:   10/26/21 130/81   07/06/21 136/79   06/04/21 122/76     /81 (Site: Left Upper Arm, Position: Sitting, Cuff Size: Large Adult)   Pulse 83   Temp 98.1 °F (36.7 °C) (Temporal)   Wt 299 lb (135.6 kg)   BMI 41.70 kg/m²   Lab Results   Component Value Date    WBC 6.3 03/12/2016    HGB 15.9 03/12/2016    HCT 47.0 03/12/2016     03/12/2016    CHOL 216 (H) 03/12/2016    TRIG 84 03/12/2016    HDL 43 04/03/2021    ALT 21 03/12/2016    AST 19 03/12/2016     (L) 04/03/2021    K 4.6 04/03/2021    CL 99 04/03/2021    CREATININE 1.04 04/03/2021    BUN 21 (H) 04/03/2021    CO2 24 04/03/2021    TSH 1.73 03/12/2016    LABA1C 6.2 (A) 07/06/2021    LABMICR 154 (H) 04/03/2021     Lab Results   Component Value Date    LABALBU 4.4 03/12/2016     No results found for: IRON, TIBC, FERRITIN  Lab Results   Component Value Date    LDLCHOLESTEROL 116 04/03/2021     I agree with the assessment, plan and the diagnosis of    Diagnosis Orders   1. 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 Essential hypertension     3.  Morbid obesity ([address]     4. Smoker . I agree with orders as documented by the resident. More than 25 minutes spent  in face to face encounter with the patient and more than half in counseling. Patient's questions were answered. Patient Voiced understanding to the counseling. Return in about 3 months (around 1/26/2022) for Hba1c .    (GC Modifier)-Dr. Samantha Stout MD

## 2021-10-27 LAB — HBA1C MFR BLD: 6 %

## 2021-11-06 DIAGNOSIS — E08.3293 DIABETES MELLITUS DUE TO UNDERLYING CONDITION WITH MILD NONPROLIFERATIVE RETINOPATHY OF BOTH EYES, WITHOUT LONG-TERM CURRENT USE OF INSULIN, MACULAR EDEMA PRESENCE UNSPECIFIED (HCC): ICD-10-CM

## 2021-11-08 RX ORDER — METFORMIN HYDROCHLORIDE 500 MG/1
TABLET, EXTENDED RELEASE ORAL
Qty: 120 TABLET | Refills: 2 | Status: SHIPPED | OUTPATIENT
Start: 2021-11-08 | End: 2022-02-01

## 2021-11-08 NOTE — TELEPHONE ENCOUNTER
Metformin pending for refill     Health Maintenance   Topic Date Due    Diabetic foot exam  Never done    Diabetic retinal exam  Never done    Hepatitis B vaccine (1 of 3 - Risk 3-dose series) Never done    COVID-19 Vaccine (2 - Pfizer 3-dose booster series) 04/28/2021    Flu vaccine (1) 09/01/2021    Diabetic microalbuminuria test  04/03/2022    Lipid screen  04/03/2022    Potassium monitoring  04/03/2022    Creatinine monitoring  04/03/2022    A1C test (Diabetic or Prediabetic)  10/26/2022    Colon cancer screen fecal DNA test (Cologuard)  05/10/2024    DTaP/Tdap/Td vaccine (2 - Td or Tdap) 08/25/2026    Pneumococcal 0-64 years Vaccine (2 of 2 - PPSV23) 08/23/2034    Shingles Vaccine  Completed    Hepatitis C screen  Completed    HIV screen  Completed    Hepatitis A vaccine  Aged Out    Hib vaccine  Aged Out    Meningococcal (ACWY) vaccine  Aged Out             (applicable per patient's age: Cancer Screenings, Depression Screening, Fall Risk Screening, Immunizations)    Hemoglobin A1C (%)   Date Value   10/26/2021 6.0   07/06/2021 6.2 (A)   06/04/2021 7.6 (A)     Microalb/Crt. Ratio (mcg/mg creat)   Date Value   04/03/2021 154 (H)     LDL Cholesterol (mg/dL)   Date Value   04/03/2021 116     AST (U/L)   Date Value   03/12/2016 19     ALT (U/L)   Date Value   03/12/2016 21     BUN (mg/dL)   Date Value   04/03/2021 21 (H)      (goal A1C is < 7)   (goal LDL is <100) need 30-50% reduction from baseline     BP Readings from Last 3 Encounters:   10/26/21 130/81   07/06/21 136/79   06/04/21 122/76    (goal /80)      All Future Testing planned in [facility]:  Lab Frequency Next Occurrence   CT lung screen [Initial/Annual] Once 59/29/8771   Basic Metabolic Panel Once 51/48/4480       Next Visit Date:  No future appointments.          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

## 2021-11-16 NOTE — PROGRESS NOTES
Attending Physician Statement  [facility],  with the resident. I have reviewed the key elements of all parts of the encounter with the resident. I agree with the assessment, plan and orders as documented by the resident.   (GE Modifier)    HTN- Well controlled  Smoker- LDCT  Newly diagnosed DM- A1c 12.4- Victoza And Metformin/ Eye exam/ ASA/Lipid PAnel  Medical managemnet referral. Component      Latest Ref Rng & Units 11/10/2021 11/15/2021 11/15/2021            4:35 PM  4:35 PM   WBC      4.2 - 11.0 K/mcL  9.0    RBC      4.50 - 5.90 mil/mcL  4.85    HGB      13.0 - 17.0 g/dL  14.1    HCT      39.0 - 51.0 %  42.7    MCV      78.0 - 100.0 fl  88.0    MCH      26.0 - 34.0 pg  29.1    MCHC      32.0 - 36.5 g/dL  33.0    RDW-CV      11.0 - 15.0 %  12.9    RDW-SD      39.0 - 50.0 fL  41.2    PLT      140 - 450 K/mcL  198    NRBC      <=0 /100 WBC  0    Neutrophil      %  70    LYMPH      %  20    MONO      %  8    EOSIN      %  2    BASO      %  0    Immature Granulocytes      %  0    Absolute Neutrophil      1.8 - 7.7 K/mcL  6.3    Absolute Lymph      1.0 - 4.0 K/mcL  1.8    Absolute Mono      0.3 - 0.9 K/mcL  0.7    Absolute Eos      0.0 - 0.5 K/mcL  0.2    Absolute Baso      0.0 - 0.3 K/mcL  0.0    Absolute Immature Granulocytes      0.0 - 0.2 K/mcL  0.0    Fasting Status      0 - 999 Hours  5 5   Sodium      135 - 145 mmol/L  141    Potassium      3.4 - 5.1 mmol/L  4.3    Chloride      98 - 107 mmol/L  106    CO2      21 - 32 mmol/L  27    ANION GAP      10 - 20 mmol/L  12    Glucose      70 - 99 mg/dL  76    BUN      6 - 20 mg/dL  20    Creatinine      0.67 - 1.17 mg/dL  1.47 (H)    Glomerular Filtration Rate      >=60  51 (L)    BUN/CREATININE RATIO      7 - 25  14    CALCIUM      8.4 - 10.2 mg/dL  8.7    TOTAL BILIRUBIN      0.2 - 1.0 mg/dL  0.3    AST/SGOT      <=37 Units/L  26    ALT/SGPT      <64 Units/L  45    ALK PHOSPHATASE      45 - 117 Units/L  95    Albumin      3.6 - 5.1 g/dL  3.9    TOTAL PROTEIN      6.4 - 8.2 g/dL  7.1    GLOBULIN      2.0 - 4.0 g/dL  3.2    A/G Ratio, Serum      1.0 - 2.4  1.2    CHOLESTEROL      <=199 mg/dL   126   TRIGLYCERIDE      <=149 mg/dL   58   HDL      >=40 mg/dL   55   CALCULATED LDL      <=129 mg/dL   59   CALCULATED NON HDL      mg/dL   71   CHOL/HDL      <=4.4   2.3   URINE MICRO      mg/dL  3.39    CREATININE, URINE (TOTAL)      mg/dL  112.00     MICROALBUMIN/CREAT      <30.0 mg/g  30.3 (H)    POCT Creatinine      0.67 - 1.17 mg/dL 1.5 (A)     GFR Estimate, Non        50     GLYCOHEMOGLOBIN A1C      4.5 - 5.6 %  5.8 (H)    NT proBNP      <=125 pg/mL  337 (H)    Troponin I, High Sensitivity      <77 ng/L  9      Patient informed of results and to continue current medication regimen.  All questions answered and patient verbalized understanding.

## 2021-12-09 DIAGNOSIS — E08.3293 DIABETES MELLITUS DUE TO UNDERLYING CONDITION WITH MILD NONPROLIFERATIVE RETINOPATHY OF BOTH EYES, WITHOUT LONG-TERM CURRENT USE OF INSULIN, MACULAR EDEMA PRESENCE UNSPECIFIED (HCC): ICD-10-CM

## 2021-12-09 RX ORDER — LIRAGLUTIDE 6 MG/ML
INJECTION SUBCUTANEOUS
Qty: 6 ML | Refills: 0 | Status: SHIPPED | OUTPATIENT
Start: 2021-12-09 | End: 2022-01-31 | Stop reason: SDUPTHER

## 2021-12-09 NOTE — TELEPHONE ENCOUNTER
Sheila Allen pending for refill     Health Maintenance   Topic Date Due    Diabetic foot exam  Never done    Diabetic retinal exam  Never done    Hepatitis B vaccine (1 of 3 - Risk 3-dose series) Never done    COVID-19 Vaccine (2 - Pfizer 3-dose booster series) 04/28/2021    Flu vaccine (1) 09/01/2021    Diabetic microalbuminuria test  04/03/2022    Lipid screen  04/03/2022    Potassium monitoring  04/03/2022    Creatinine monitoring  04/03/2022    A1C test (Diabetic or Prediabetic)  10/26/2022    Colon cancer screen fecal DNA test (Cologuard)  05/10/2024    DTaP/Tdap/Td vaccine (2 - Td or Tdap) 08/25/2026    Pneumococcal 0-64 years Vaccine (2 of 2 - PPSV23) 08/23/2034    Shingles Vaccine  Completed    Hepatitis C screen  Completed    HIV screen  Completed    Hepatitis A vaccine  Aged Out    Hib vaccine  Aged Out    Meningococcal (ACWY) vaccine  Aged Out             (applicable per patient's age: Cancer Screenings, Depression Screening, Fall Risk Screening, Immunizations)    Hemoglobin A1C (%)   Date Value   10/26/2021 6.0   07/06/2021 6.2 (A)   06/04/2021 7.6 (A)     Microalb/Crt. Ratio (mcg/mg creat)   Date Value   04/03/2021 154 (H)     LDL Cholesterol (mg/dL)   Date Value   04/03/2021 116     AST (U/L)   Date Value   03/12/2016 19     ALT (U/L)   Date Value   03/12/2016 21     BUN (mg/dL)   Date Value   04/03/2021 21 (H)      (goal A1C is < 7)   (goal LDL is <100) need 30-50% reduction from baseline     BP Readings from Last 3 Encounters:   10/26/21 130/81   07/06/21 136/79   06/04/21 122/76    (goal /80)      All Future Testing planned in [facility]:  Lab Frequency Next Occurrence   CT lung screen [Initial/Annual] Once 47/74/7730   Basic Metabolic Panel Once 09/12/8938       Next Visit Date:  No future appointments.          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

## 2022-01-26 DIAGNOSIS — E08.3293 DIABETES MELLITUS DUE TO UNDERLYING CONDITION WITH MILD NONPROLIFERATIVE RETINOPATHY OF BOTH EYES, WITHOUT LONG-TERM CURRENT USE OF INSULIN, MACULAR EDEMA PRESENCE UNSPECIFIED (HCC): ICD-10-CM

## 2022-01-26 RX ORDER — LIRAGLUTIDE 6 MG/ML
INJECTION SUBCUTANEOUS
Qty: 6 ML | Refills: 0 | OUTPATIENT
Start: 2022-01-26

## 2022-01-26 NOTE — TELEPHONE ENCOUNTER
Last visit:   Last Med refill:   Does patient have enough medication for 72 hours: No:     Next Visit Date:  No future appointments. Health Maintenance   Topic Date Due    Diabetic foot exam  Never done    Diabetic retinal exam  Never done    Hepatitis B vaccine (1 of 3 - Risk 3-dose series) Never done    COVID-19 Vaccine (2 - Pfizer 3-dose series) 04/28/2021    Flu vaccine (1) 09/01/2021    Diabetic microalbuminuria test  04/03/2022    Lipid screen  04/03/2022    Potassium monitoring  04/03/2022    Creatinine monitoring  04/03/2022    Depression Screen  04/22/2022    A1C test (Diabetic or Prediabetic)  10/26/2022    Colon cancer screen fecal DNA test (Cologuard)  05/10/2024    DTaP/Tdap/Td vaccine (2 - Td or Tdap) 08/25/2026    Pneumococcal 0-64 years Vaccine (2 of 2 - PPSV23) 08/23/2034    Shingles Vaccine  Completed    Hepatitis C screen  Completed    HIV screen  Completed    Hepatitis A vaccine  Aged Out    Hib vaccine  Aged Out    Meningococcal (ACWY) vaccine  Aged Out       Hemoglobin A1C (%)   Date Value   10/26/2021 6.0   07/06/2021 6.2 (A)   06/04/2021 7.6 (A)             ( goal A1C is < 7)   Microalb/Crt.  Ratio (mcg/mg creat)   Date Value   04/03/2021 154 (H)     LDL Cholesterol (mg/dL)   Date Value   04/03/2021 116   03/12/2016 148 (H)       (goal LDL is <100)   AST (U/L)   Date Value   03/12/2016 19     ALT (U/L)   Date Value   03/12/2016 21     BUN (mg/dL)   Date Value   04/03/2021 21 (H)     BP Readings from Last 3 Encounters:   10/26/21 130/81   07/06/21 136/79   06/04/21 122/76          (goal 120/80)    All Future Testing planned in [facility]  Lab Frequency Next Occurrence   CT lung screen [Initial/Annual] Once 18/25/6492   Basic Metabolic Panel Once 15/06/8827               Patient Active Problem List:     Elevated blood pressure reading     Smoker     Morbid obesity ([address]     Need for vaccination     Right arm numbness     Hypovitaminosis D     Tobacco abuse     Essential hypertension     Smoking     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 If I can be of assistance, please route to the applicable pool. Thank you.

## 2022-01-31 DIAGNOSIS — E08.3293 DIABETES MELLITUS DUE TO UNDERLYING CONDITION WITH MILD NONPROLIFERATIVE RETINOPATHY OF BOTH EYES, WITHOUT LONG-TERM CURRENT USE OF INSULIN, MACULAR EDEMA PRESENCE UNSPECIFIED (HCC): ICD-10-CM

## 2022-01-31 RX ORDER — LIRAGLUTIDE 6 MG/ML
INJECTION SUBCUTANEOUS
Qty: 6 ML | Refills: 0 | Status: SHIPPED | OUTPATIENT
Start: 2022-01-31 | End: 2022-02-15 | Stop reason: SDUPTHER

## 2022-01-31 NOTE — TELEPHONE ENCOUNTER
Last visit:   Last Med refill:   Does patient have enough medication for 72 hours: No:     Next Visit Date:  No future appointments. Health Maintenance   Topic Date Due    Diabetic foot exam  Never done    Diabetic retinal exam  Never done    Hepatitis B vaccine (1 of 3 - Risk 3-dose series) Never done    COVID-19 Vaccine (2 - Pfizer 3-dose series) 04/28/2021    Flu vaccine (1) 09/01/2021    Diabetic microalbuminuria test  04/03/2022    Lipid screen  04/03/2022    Potassium monitoring  04/03/2022    Creatinine monitoring  04/03/2022    Depression Screen  04/22/2022    A1C test (Diabetic or Prediabetic)  10/26/2022    Colon cancer screen fecal DNA test (Cologuard)  05/10/2024    DTaP/Tdap/Td vaccine (2 - Td or Tdap) 08/25/2026    Pneumococcal 0-64 years Vaccine (2 of 2 - PPSV23) 08/23/2034    Shingles Vaccine  Completed    Hepatitis C screen  Completed    HIV screen  Completed    Hepatitis A vaccine  Aged Out    Hib vaccine  Aged Out    Meningococcal (ACWY) vaccine  Aged Out       Hemoglobin A1C (%)   Date Value   10/26/2021 6.0   07/06/2021 6.2 (A)   06/04/2021 7.6 (A)             ( goal A1C is < 7)   Microalb/Crt.  Ratio (mcg/mg creat)   Date Value   04/03/2021 154 (H)     LDL Cholesterol (mg/dL)   Date Value   04/03/2021 116   03/12/2016 148 (H)       (goal LDL is <100)   AST (U/L)   Date Value   03/12/2016 19     ALT (U/L)   Date Value   03/12/2016 21     BUN (mg/dL)   Date Value   04/03/2021 21 (H)     BP Readings from Last 3 Encounters:   10/26/21 130/81   07/06/21 136/79   06/04/21 122/76          (goal 120/80)    All Future Testing planned in [facility]  Lab Frequency Next Occurrence   CT lung screen [Initial/Annual] Once 84/22/1297   Basic Metabolic Panel Once 13/38/8928               Patient Active Problem List:     Elevated blood pressure reading     Smoker     Morbid obesity ([address]     Need for vaccination     Right arm numbness     Hypovitaminosis D     Tobacco abuse     Essential hypertension     Smoking     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 If I can be of assistance, please route to the applicable pool. Thank you.

## 2022-02-01 DIAGNOSIS — E08.3293 DIABETES MELLITUS DUE TO UNDERLYING CONDITION WITH MILD NONPROLIFERATIVE RETINOPATHY OF BOTH EYES, WITHOUT LONG-TERM CURRENT USE OF INSULIN, MACULAR EDEMA PRESENCE UNSPECIFIED (HCC): ICD-10-CM

## 2022-02-01 RX ORDER — METFORMIN HYDROCHLORIDE 500 MG/1
TABLET, EXTENDED RELEASE ORAL
Qty: 120 TABLET | Refills: 2 | Status: SHIPPED | OUTPATIENT
Start: 2022-02-01 | End: 2022-02-15 | Stop reason: SDUPTHER

## 2022-02-01 NOTE — TELEPHONE ENCOUNTER
E-scribe request for med refill. Please review and e-scribe if applicable. Last Visit Date: 10/26/21   Next Visit Date:  Visit date not found    Hemoglobin A1C (%)   Date Value   10/26/2021 6.0   07/06/2021 6.2 (A)   06/04/2021 7.6 (A)             ( goal A1C is < 7)   Microalb/Crt.  Ratio (mcg/mg creat)   Date Value   04/03/2021 154 (H)     LDL Cholesterol (mg/dL)   Date Value   04/03/2021 116       (goal LDL is <100)   AST (U/L)   Date Value   03/12/2016 19     ALT (U/L)   Date Value   03/12/2016 21     BUN (mg/dL)   Date Value   04/03/2021 21 (H)     BP Readings from Last 3 Encounters:   10/26/21 130/81   07/06/21 136/79   06/04/21 122/76          (goal 120/80)        Patient Active Problem List:     Elevated blood pressure reading     Smoker     Morbid obesity ([address]     Need for vaccination     Right arm numbness     Hypovitaminosis D     Tobacco abuse     Essential hypertension     Smoking     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

## 2022-02-15 ENCOUNTER — OFFICE VISIT (OUTPATIENT)
Dept: FAMILY MEDICINE CLINIC | Age: 52
End: 2022-02-15
Payer: COMMERCIAL

## 2022-02-15 VITALS
HEART RATE: 92 BPM | WEIGHT: 308.8 LBS | DIASTOLIC BLOOD PRESSURE: 90 MMHG | BODY MASS INDEX: 43.07 KG/M2 | SYSTOLIC BLOOD PRESSURE: 150 MMHG

## 2022-02-15 DIAGNOSIS — M67.432 GANGLION CYST OF WRIST, LEFT: ICD-10-CM

## 2022-02-15 DIAGNOSIS — I10 ESSENTIAL HYPERTENSION: ICD-10-CM

## 2022-02-15 DIAGNOSIS — E08.3293 DIABETES MELLITUS DUE TO UNDERLYING CONDITION WITH MILD NONPROLIFERATIVE RETINOPATHY OF BOTH EYES, WITHOUT LONG-TERM CURRENT USE OF INSULIN, MACULAR EDEMA PRESENCE UNSPECIFIED (HCC): Primary | ICD-10-CM

## 2022-02-15 PROCEDURE — 99213 OFFICE O/P EST LOW 20 MIN: CPT | Performed by: STUDENT IN AN ORGANIZED HEALTH CARE EDUCATION/TRAINING PROGRAM

## 2022-02-15 RX ORDER — METFORMIN HYDROCHLORIDE 500 MG/1
TABLET, EXTENDED RELEASE ORAL
Qty: 120 TABLET | Refills: 5 | Status: SHIPPED | OUTPATIENT
Start: 2022-02-15 | End: 2022-04-26 | Stop reason: SDUPTHER

## 2022-02-15 RX ORDER — LISINOPRIL 20 MG/1
TABLET ORAL
Qty: 30 TABLET | Refills: 5 | Status: SHIPPED | OUTPATIENT
Start: 2022-02-15 | End: 2022-08-26

## 2022-02-15 RX ORDER — ASPIRIN 81 MG/1
TABLET ORAL
Qty: 90 TABLET | Refills: 5 | Status: SHIPPED | OUTPATIENT
Start: 2022-02-15

## 2022-02-15 RX ORDER — LIRAGLUTIDE 6 MG/ML
INJECTION SUBCUTANEOUS
Qty: 6 ML | Refills: 5 | Status: SHIPPED | OUTPATIENT
Start: 2022-02-15 | End: 2022-04-26 | Stop reason: SDUPTHER

## 2022-02-15 ASSESSMENT — ENCOUNTER SYMPTOMS
ABDOMINAL DISTENTION: 0
NAUSEA: 0
ABDOMINAL PAIN: 0
SHORTNESS OF BREATH: 0
VOICE CHANGE: 0
COLOR CHANGE: 0
COUGH: 0
VOMITING: 0
SINUS PAIN: 0
WHEEZING: 0
SINUS PRESSURE: 0

## 2022-02-15 NOTE — PROGRESS NOTES
Subjective:    Evelyn Cantor is a 45 y.o. male with  has a past medical history of Essential hypertension, Morbid obesity ([address] and Tobacco abuse. Presented to the office today for:  Chief Complaint   Patient presents with    Follow-up     knot on left wrist    Discuss Medications     BP medication       HPI  Patient is a 48-year-old male with past medical history of hypertension, diabetes. He is presenting today for regular checkup. Patient states that for the last 1 week he has been having some stiffness which he usually has when he has elevated blood pressure. Is been compliant with his medications. He does not check his blood pressure at home. Is compliant with his diabetes medications as well. His fasting glucose has been between 110 and 140. Denies any chest pain or trouble breathing at this time. He also has a bump over his left breast that is concerning to him and has been increasing in size over the last 1 year. He is currently experiencing numbness which is intermittent going down his first two digits of the left hand. Review of Systems   Constitutional: Negative for fatigue, fever and unexpected weight change. HENT: Negative for sinus pressure, sinus pain and voice change. Eyes: Negative for visual disturbance. Respiratory: Negative for cough, shortness of breath and wheezing. Cardiovascular: Negative for chest pain, palpitations and leg swelling. Gastrointestinal: Negative for abdominal distention, abdominal pain, nausea and vomiting. Endocrine: Negative for polydipsia, polyphagia and polyuria. Genitourinary: Negative for difficulty urinating, flank pain and frequency. Musculoskeletal: Positive for joint swelling (lump over the left wrist ). Skin: Negative for color change, rash and wound. Neurological: Negative for dizziness, light-headedness, numbness and headaches. Psychiatric/Behavioral: Negative for confusion and decreased concentration.  The patient is not nervous/anxious. The patient has a   Family History   Problem Relation Age of Onset    Heart Disease Mother     High Blood Pressure Mother     Diabetes Mother     Cancer Father        Objective:    BP (!) 150/90 (Site: Right Upper Arm, Position: Sitting, Cuff Size: Medium Adult)   Pulse 92   Wt (!) 308 lb 12.8 oz (140.1 kg)   BMI 43.07 kg/m²    BP Readings from Last 3 Encounters:   02/15/22 (!) 150/90   10/26/21 130/81   07/06/21 136/79       Physical Exam  Constitutional:       Appearance: He is well-developed. HENT:      Head: Normocephalic and atraumatic. Eyes:      Pupils: Pupils are equal, round, and reactive to light. Cardiovascular:      Rate and Rhythm: Normal rate and regular rhythm. Heart sounds: Normal heart sounds. No murmur heard. No friction rub. No gallop. Pulmonary:      Effort: Pulmonary effort is normal. No respiratory distress. Breath sounds: Normal breath sounds. No wheezing or rales. Chest:      Chest wall: No tenderness. Abdominal:      General: Bowel sounds are normal. There is no distension. Palpations: Abdomen is soft. Tenderness: There is no abdominal tenderness. Musculoskeletal:         General: Swelling (tenderness, lump over the left wrist. hard to touch, mobile. ) and tenderness present. Skin:     General: Skin is warm. Findings: No erythema or rash. Neurological:      Mental Status: He is alert and oriented to person, place, and time. Sensory: Sensory deficit (intermittent tingling to the index and thumb of left hand) present.          Lab Results   Component Value Date    WBC 6.3 03/12/2016    HGB 15.9 03/12/2016    HCT 47.0 03/12/2016     03/12/2016    CHOL 216 (H) 03/12/2016    TRIG 84 03/12/2016    HDL 43 04/03/2021    ALT 21 03/12/2016    AST 19 03/12/2016     (L) 04/03/2021    K 4.6 04/03/2021    CL 99 04/03/2021    CREATININE 1.04 04/03/2021    BUN 21 (H) 04/03/2021    CO2 24 04/03/2021 TSH 1.73 03/12/2016    LABA1C 6.0 10/26/2021    LABMICR 154 (H) 04/03/2021     Lab Results   Component Value Date    CALCIUM 9.4 04/03/2021     Lab Results   Component Value Date    LDLCHOLESTEROL 116 04/03/2021       Assessment and Plan:    1. 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 Essential hypertension  - patient would need BMP, CBC during next visit  - lisinopril (PRINIVIL;ZESTRIL) 20 MG tablet; take 1 tablet by mouth once daily  Dispense: 30 tablet; Refill: 5  - aspirin (ASPIRIN LOW DOSE) 81 MG EC tablet; take 1 tablet by mouth once daily  Dispense: 90 tablet; Refill: 5    3.  Ganglion cyst of wrist, left  - Linh Molina MD, Orthopedic Surgery, [place]          Requested Prescriptions     Signed Prescriptions Disp Refills    Liraglutide (VICTOZA) 18 MG/3ML SOPN SC injection 6 mL 5     Sig: inject 0.6 milligram subcutaneously daily    metFORMIN (GLUCOPHAGE-XR) 500 MG extended release tablet 120 tablet 5     Sig: take 2 tablets by mouth twice a day    lisinopril (PRINIVIL;ZESTRIL) 20 MG tablet 30 tablet 5     Sig: take 1 tablet by mouth once daily    aspirin (ASPIRIN LOW DOSE) 81 MG EC tablet 90 tablet 5     Sig: take 1 tablet by mouth once daily       Medications Discontinued During This Encounter   Medication Reason    ASPIRIN LOW DOSE 81 MG EC tablet REORDER    lisinopril (PRINIVIL;ZESTRIL) 20 MG tablet REORDER    Liraglutide (VICTOZA) 18 MG/3ML SOPN SC injection REORDER    metFORMIN (GLUCOPHAGE-XR) 500 MG extended release tablet REORDER       Arnaldo received counseling on the following healthy behaviors: nutrition, exercise and medication adherence    Discussed use,benefit, and side effects of prescribed medications. Barriers to medication compliance addressed. All patient questions answered. Pt voiced understanding. Return in about 1 week (around 2/22/2022) for HTN, BP check. Disclaimer: Some orall of this note was transcribed using voice-recognition software. This may cause typographical errors occasionally. Although all effort is made to fix these errors, please do not hesitate to contact our office if there Monica Estrada concern with the understanding of this note.

## 2022-02-15 NOTE — PATIENT INSTRUCTIONS
Thank you for letting us take care of you today. We hope all your questions were addressed. If a question was overlooked or something else comes to mind after you return home, please contact a member of your Care Team listed below. Your Care Team at [place] [address] is Team #4  Janet Apple MD (Faculty)  Ankit Baker MD (Resident)  Yasmani Gordon MD (Resident)  Yumi Chambers MD (Resident)  Anila Torres MD (Resident)  ZO Beltran., DIO Wright., Jacqui Peter., [facility] office)  Yamel Farrell, [address] (Clinical Practice Manager)  Giovanni Abdi [facility] (Clinical Pharmacist)       Office phone number: [phone]    If you need to get in right away due to illness, please be advised we have \"Same Day\" appointments available Monday-Friday. Please call us at [phone] option #3 to schedule your \"Same Day\" appointment.

## 2022-02-15 NOTE — PROGRESS NOTES
Attending Physician Statement  I have discussed the care of [address] [place] [address], including pertinent history and exam findings,  with the resident. I have reviewed the key elements of all parts of the encounter with the resident. I agree with the assessment, plan and orders as documented by the resident.   (Jerson Kimble)    Wiliam Vidales MD

## 2022-02-23 DIAGNOSIS — M25.532 LEFT WRIST PAIN: Primary | ICD-10-CM

## 2022-02-28 ENCOUNTER — OFFICE VISIT (OUTPATIENT)
Dept: ORTHOPEDIC SURGERY | Age: 52
End: 2022-02-28
Payer: COMMERCIAL

## 2022-02-28 VITALS — BODY MASS INDEX: 43.12 KG/M2 | RESPIRATION RATE: 16 BRPM | WEIGHT: 308 LBS | HEIGHT: 71 IN

## 2022-02-28 DIAGNOSIS — M67.40 GANGLION CYST: ICD-10-CM

## 2022-02-28 DIAGNOSIS — M67.40 GANGLION CYST: Primary | ICD-10-CM

## 2022-02-28 DIAGNOSIS — G56.00 MEDIAN NERVE COMPRESSION: ICD-10-CM

## 2022-02-28 DIAGNOSIS — M79.5 FOREIGN BODY (FB) IN SOFT TISSUE: Primary | ICD-10-CM

## 2022-02-28 PROCEDURE — 99203 OFFICE O/P NEW LOW 30 MIN: CPT | Performed by: ORTHOPAEDIC SURGERY

## 2022-02-28 NOTE — PROGRESS NOTES
[facility]  [address] [address]  Dept: [phone]    Ambulatory Orthopedic Consult      CHIEF COMPLAINT:    Chief Complaint   Patient presents with    Wrist Pain     Left       HISTORY OF PRESENT ILLNESS:      The patient is a right hand dominant 45 y.o. male who is being seen for evaluation of the above, which began around 2/28/2021 atraumatically  . At today's visit, he is using no brace/assistive device. History is obtained today from:   [x]  the patient     [x]  EMR     []  one family member/friend    []  multiple family members/friends    []  other:      He reports associated numbness/tingling     REVIEW OF SYSTEMS:  Musculoskeletal: See HPI for pertinent positives     Past Medical History:    He  has a past medical history of Essential hypertension (8/25/2016), Mor[address] [address] (2/26/2016), and Tobacco abuse. Past Surgical History:    He  has no past surgical history on file. Current Medications:     Current Outpatient Medications:     Liraglutide (VICTOZA) 18 MG/3ML SOPN SC injection, inject 0.6 milligram subcutaneously daily, Disp: 6 mL, Rfl: 5    metFORMIN (GLUCOPHAGE-XR) 500 MG extended release tablet, take 2 tablets by mouth twice a day, Disp: 120 tablet, Rfl: 5    lisinopril (PRINIVIL;ZESTRIL) 20 MG tablet, take 1 tablet by mouth once daily, Disp: 30 tablet, Rfl: 5    aspirin (ASPIRIN LOW DOSE) 81 MG EC tablet, take 1 tablet by mouth once daily, Disp: 90 tablet, Rfl: 5    Insulin Pen Needle 31G X 6 MM MISC, Use 1 daily with victoza injection, Disp: 100 each, Rfl: 3    naproxen (NAPROSYN) 500 MG tablet, take 1 tablet by mouth twice a day with meals, Disp: 60 tablet, Rfl: 3    nicotine (NICODERM CQ) 21 MG/24HR, Place 1 patch onto the skin every 24 hours, Disp: 30 patch, Rfl: 3     Allergies:    Patient has no known allergies.     Family History:  family history includes Cancer in his father; Diabetes in his mother; Heart Disease in his mother; High Blood Pressure in his mother. Social History:   Social History     Occupational History    Not on file   Tobacco Use    Smoking status: Current Every Day Smoker    Smokeless tobacco: Never Used   Substance and Sexual Activity    Alcohol use: No     Alcohol/week: 0.0 standard drinks    Drug use: No    Sexual activity: Not on file          OBJECTIVE:  Resp 16   Ht 5' 11\" (1.803 m)   Wt (!) 308 lb (139.7 kg)   BMI 42.96 kg/m²    Psych: awake, alert  Cardio:  well perfused extremities, no cyanosis  Resp:  normal respiratory effort  Musculoskeletal:    RUE:  Vascular: Limb well perfused, compartments soft/compressible. Skin: No erythema/ulcers. Intact. Neurovascular Status:  Grossly neurovascularly intact throughout   Tenderness to Palpation:        LUE:  Vascular: Limb well perfused, compartments soft/compressible. Skin: No erythema/ulcers. Intact. Neurovascular Status:  Grossly neurovascularly intact throughout   Tenderness to Palpation: Volar radial wrist with associated rubbery nonmobile mass  -Negative Tinel's      RADIOLOGY:   2/28/2022 FINDINGS:  Four views (AP, Oblique, Scaphoid and Lateral) of the left wrist were obtained in the office today and reviewed, revealing no acute fracture, dislocation, or radioopaque foreign body/tumor. Overall alignment is satisfactory. IMPRESSION:  No acute fracture/dislocation. Electronically signed by Malini Bush MD        Relevant previous imaging reviewed, both imaging and report(s) as below:    No results found. ASSESSMENT AND PLAN:  Body mass index is 42.96 kg/m². He has left wrist pain with overlying soft tissue mass, likely secondary to a ganglion cyst.  He does have signs/symptoms of median nerve compression. Notably, he has the past medical history as above.   He has a history of low vitamin D, non-insulin-dependent diabetes, and tobacco use (smokes 1 pack of cigarettes per day). We had a discussion today about the likely diagnosis and its natural history, physical exam and imaging findings, as well as various treatment options in detail. Surgically, we discussed a possible cyst excision, with possible carpal tunnel decompression. Orders/referrals were placed as below at today's visit. In order to know exactly how to proceed with treatment, an MRI with/without contrast was ordered today to evaluate the soft tissue mass. This is medically necessary to evaluate the structures in this area, for both diagnosis and treatment. All questions were answered and the above plan was agreed upon. The patient will return to clinic after the MRI has been obtained. At his next visit, we will review his MRI, and consider multiple treatment options. At the patient's next visit, depending on how the patient is doing and/or new imaging/labs results, we may consider the following options:    []  Lace up ankle     []  CAM boot         []  removable wrist brace     []  PT:        []  Wean out immobilization         []  Adv activity      []  Footmind        []  Spenco       []  Custom Orthotic:               []  AZ brace                    []  Rocker Bottom      []  Night splint    []  Heel cups        []  Strap        []  Toe gizmos    []  Topl        []  NSAIDs         []  Juan Carlos        []  Ref:         []  Stress Xray    []  CT        []  MRI  []  Inj:          []  Consider OR      []  Pick OR date    No follow-ups on file. No orders of the defined types were placed in this encounter. No orders of the defined types were placed in this encounter. Tyler Herrera MD  Orthopedic Surgery        Please excuse any typos/errors, as this note was created with the assistance of voice recognition software.  While intending to generate a document that actually reflects the content of the visit, the document can still have some errors including those of syntax and sound-a-like substitutions which may escape proof reading. In such instances, actual meaning can be extrapolated by context.

## 2022-02-28 NOTE — LETTER
Dr. Salvatore Schmitz  [address] [place] [address]  [phone]        2/28/2022     Patient: Juwan Barrett  YOB: 1970    Dear Martha Montes De Oca MD,    I had the pleasure of seeing one of your patients, Juwan Barrett, recently in the office. Below are the relevant portions of my assessment and plan of care. ASSESSMENT AND PLAN:  He has left wrist pain with overlying soft tissue mass, likely secondary to a ganglion cyst.  He does have signs/symptoms of median nerve compression. Notably, he has the past medical history as above. He has a history of low vitamin D, non-insulin-dependent diabetes, and tobacco use (smokes 1 pack of cigarettes per day). We had a discussion today about the likely diagnosis and its natural history, physical exam and imaging findings, as well as various treatment options in detail. Surgically, we discussed a possible cyst excision, with possible carpal tunnel decompression. Orders/referrals were placed as below at today's visit. In order to know exactly how to proceed with treatment, an MRI with/without contrast was ordered today to evaluate the soft tissue mass. This is medically necessary to evaluate the structures in this area, for both diagnosis and treatment. All questions were answered and the above plan was agreed upon. The patient will return to clinic after the MRI has been obtained. At his next visit, we will review his MRI, and consider multiple treatment options. Thank you for allowing me to participate in the care of this patient. I look forward to serving you and your patients again in the future. Please don't hesitate to contact me at my mobile number .       Ulises Velazco MD  Orthopedic Surgery

## 2022-03-01 ENCOUNTER — TELEPHONE (OUTPATIENT)
Dept: ORTHOPEDIC SURGERY | Age: 52
End: 2022-03-01

## 2022-03-01 ENCOUNTER — OFFICE VISIT (OUTPATIENT)
Dept: FAMILY MEDICINE CLINIC | Age: 52
End: 2022-03-01
Payer: COMMERCIAL

## 2022-03-01 VITALS
HEIGHT: 71 IN | HEART RATE: 93 BPM | WEIGHT: 306.2 LBS | TEMPERATURE: 97.2 F | DIASTOLIC BLOOD PRESSURE: 77 MMHG | SYSTOLIC BLOOD PRESSURE: 138 MMHG | BODY MASS INDEX: 42.87 KG/M2

## 2022-03-01 DIAGNOSIS — I10 ESSENTIAL HYPERTENSION: Primary | ICD-10-CM

## 2022-03-01 DIAGNOSIS — M67.432 GANGLION CYST OF WRIST, LEFT: ICD-10-CM

## 2022-03-01 PROCEDURE — 99213 OFFICE O/P EST LOW 20 MIN: CPT | Performed by: STUDENT IN AN ORGANIZED HEALTH CARE EDUCATION/TRAINING PROGRAM

## 2022-03-01 ASSESSMENT — ENCOUNTER SYMPTOMS
SINUS PAIN: 0
COUGH: 0
ABDOMINAL PAIN: 0
NAUSEA: 0
COLOR CHANGE: 0
WHEEZING: 0
SHORTNESS OF BREATH: 0
VOMITING: 0
ABDOMINAL DISTENTION: 0
SINUS PRESSURE: 0
VOICE CHANGE: 0

## 2022-03-01 NOTE — PROGRESS NOTES
HYPERTENSION visit     BP Readings from Last 3 Encounters:   02/15/22 (!) 150/90   10/26/21 130/81   07/06/21 136/79       LDL Cholesterol (mg/dL)   Date Value   04/03/2021 116     HDL (mg/dL)   Date Value   04/03/2021 43     BUN (mg/dL)   Date Value   04/03/2021 21 (H)     CREATININE (mg/dL)   Date Value   04/03/2021 1.04     Glucose (mg/dL)   Date Value   04/03/2021 159 (H)              Have you changed or started any medications since your last visit including any over-the-counter medicines, vitamins, or herbal medicines? no   Have you stopped taking any of your medications? Is so, why? -  no  Are you having any side effects from any of your medications? - no  How often do you miss doses of your medication? rare      Have you seen any other physician or provider since your last visit? yes - Ortho   Have you had any other diagnostic tests since your last visit? yes - XR   Have you been seen in the emergency room and/or had an admission in a hospital since we last saw you?  no   Have you had your routine dental cleaning in the past 6 months?  no     Do you have an active MyChart account? If no, what is the barrier?   No: Declines    Patient Care Team:  Fifi Brand MD as PCP - General (Family Medicine)  Alba Verma MD as PCP - [facility] Provider    Medical History Review  Past Medical, Family, and Social History reviewed and does contribute to the patient presenting condition    Health Maintenance   Topic Date Due    Diabetic foot exam  Never done    Diabetic retinal exam  Never done    Hepatitis B vaccine (1 of 3 - Risk 3-dose series) Never done    Flu vaccine (1) 09/01/2021    Diabetic microalbuminuria test  04/03/2022    Lipid screen  04/03/2022    Potassium monitoring  04/03/2022    Creatinine monitoring  04/03/2022    Depression Screen  04/22/2022    COVID-19 Vaccine (3 - Booster for Pfizer series) 06/12/2022    A1C test (Diabetic or Prediabetic)  10/26/2022    Colorectal Cancer Screen  05/10/2024    DTaP/Tdap/Td vaccine (2 - Td or Tdap) 08/25/2026    Pneumococcal 0-64 years Vaccine (2 of 2 - PPSV23) 08/23/2034    Shingles Vaccine  Completed    Hepatitis C screen  Completed    HIV screen  Completed    Hepatitis A vaccine  Aged Out    Hib vaccine  Aged Out    Meningococcal (ACWY) vaccine  Aged Out

## 2022-03-01 NOTE — PROGRESS NOTES
Subjective:    Gopal Galarza is a 45 y.o. male with  has a past medical history of Essential hypertension, Morbid obesity ([address] and Tobacco abuse. Presented to the office today for:  Chief Complaint   Patient presents with    Hypertension     follow up       HPI      Patient is 79-year-old male who is presenting today for blood pressure check. Patient is currently taking lisinopril 20 mg daily. Is been compliant with medications, home blood pressure log reviewed. Patient is also scheduled to get an MRI for his left wrist ganglion cyst.  He is following up with orthopedics currently. Review of Systems   Constitutional: Negative for fatigue, fever and unexpected weight change. HENT: Negative for sinus pressure, sinus pain and voice change. Eyes: Negative for visual disturbance. Respiratory: Negative for cough, shortness of breath and wheezing. Cardiovascular: Negative for chest pain, palpitations and leg swelling. Gastrointestinal: Negative for abdominal distention, abdominal pain, nausea and vomiting. Endocrine: Negative for polydipsia, polyphagia and polyuria. Genitourinary: Negative for difficulty urinating, flank pain and frequency. Musculoskeletal:        Left wrist ganglion cyst   Skin: Negative for color change, rash and wound. Neurological: Negative for dizziness, light-headedness, numbness and headaches. Psychiatric/Behavioral: Negative for confusion and decreased concentration. The patient is not nervous/anxious.                   The patient has a   Family History   Problem Relation Age of Onset    Heart Disease Mother     High Blood Pressure Mother     Diabetes Mother     Cancer Father        Objective:    /77 (Site: Right Upper Arm, Position: Sitting, Cuff Size: Large Adult)   Pulse 93   Temp 97.2 °F (36.2 °C) (Temporal)   Ht 5' 10.98\" (1.803 m)   Wt (!) 306 lb 3.2 oz (138.9 kg)   BMI 42.73 kg/m²    BP Readings from Last 3 Encounters:   03/01/22 138/77 medications. Arnaldo received counseling on the following healthy behaviors: nutrition, exercise and medication adherence    Discussed use,benefit, and side effects of prescribed medications. Barriers to medication compliance addressed. All patient questions answered. Pt voiced understanding. Return in about 4 months (around 7/1/2022). Disclaimer: Some orall of this note was transcribed using voice-recognition software. This may cause typographical errors occasionally. Although all effort is made to fix these errors, please do not hesitate to contact our office if there Sage Deleon concern with the understanding of this note.

## 2022-03-01 NOTE — TELEPHONE ENCOUNTER
[facility] reached out, MRI was denied. Office visit notes were faxed to the RN reviewer and for the physician to review for the peer to peer.

## 2022-03-01 NOTE — PROGRESS NOTES
Attending Physician Statement  I have discussed the care of [address] [place] [address], including pertinent history and exam findings,  with the resident. I have reviewed the key elements of all parts of the encounter with the resident. I agree with the assessment, plan and orders as documented by the resident.   (Santhosh Hirsch)    Tommy Muro MD

## 2022-03-01 NOTE — PATIENT INSTRUCTIONS
Thank you for letting us take care of you today. We hope all your questions were addressed. If a question was overlooked or something else comes to mind after you return home, please contact a member of your Care Team listed below. Your Care Team at [place] [address] is Team #4  Isabel Stephenson MD (Faculty)  Pepper Cannon MD (Resident)  Nhi Arzola MD (Resident)  Han Hamlin MD (Resident)  Kim Booker MD (Resident)  ZO Beltran., DIO Wheeler., Marleen Meraz., Danilo Andrade [facility] office)  Giovanni Nunez, [address] (Clinical Practice Manager)  Caryn Singh, [facility] (Clinical Pharmacist)       Office phone number: [phone]    If you need to get in right away due to illness, please be advised we have \"Same Day\" appointments available Monday-Friday. Please call us at [phone] option #3 to schedule your \"Same Day\" appointment.

## 2022-03-08 ENCOUNTER — TELEPHONE (OUTPATIENT)
Dept: ORTHOPEDIC SURGERY | Age: 52
End: 2022-03-08

## 2022-03-08 NOTE — TELEPHONE ENCOUNTER
Spoke with patient, MRI has been approved. Confirmation number- [id number]. MRI is scheduled and follow up appointment is scheduled.

## 2022-03-09 ENCOUNTER — HOSPITAL ENCOUNTER (OUTPATIENT)
Dept: MRI IMAGING | Age: 52
Discharge: HOME | End: 2022-03-11
Payer: COMMERCIAL

## 2022-03-09 ENCOUNTER — HOSPITAL ENCOUNTER (OUTPATIENT)
Dept: GENERAL RADIOLOGY | Age: 52
Discharge: HOME | End: 2022-03-11
Payer: COMMERCIAL

## 2022-03-09 ENCOUNTER — HOSPITAL ENCOUNTER (OUTPATIENT)
Age: 52
Discharge: HOME | End: 2022-03-11
Payer: COMMERCIAL

## 2022-03-09 DIAGNOSIS — M67.40 GANGLION CYST: ICD-10-CM

## 2022-03-09 DIAGNOSIS — G56.00 MEDIAN NERVE COMPRESSION: ICD-10-CM

## 2022-03-09 DIAGNOSIS — M79.5 FOREIGN BODY (FB) IN SOFT TISSUE: ICD-10-CM

## 2022-03-09 LAB
GFR NON-AFRICAN AMERICAN: >60 ML/MIN
GFR SERPL CREATININE-BSD FRML MDRD: >60 ML/MIN
GFR SERPL CREATININE-BSD FRML MDRD: NORMAL ML/MIN/{1.73_M2}
POC CREATININE: 0.99 MG/DL (ref 0.51–1.19)

## 2022-03-09 PROCEDURE — 70030 X-RAY EYE FOR FOREIGN BODY: CPT

## 2022-03-09 PROCEDURE — 73223 MRI JOINT UPR EXTR W/O&W/DYE: CPT

## 2022-03-09 PROCEDURE — A9579 GAD-BASE MR CONTRAST NOS,1ML: HCPCS | Performed by: ORTHOPAEDIC SURGERY

## 2022-03-09 PROCEDURE — 2580000003 HC RX 258: Performed by: ORTHOPAEDIC SURGERY

## 2022-03-09 PROCEDURE — 82565 ASSAY OF CREATININE: CPT

## 2022-03-09 PROCEDURE — 6360000004 HC RX CONTRAST MEDICATION: Performed by: ORTHOPAEDIC SURGERY

## 2022-03-09 RX ORDER — SODIUM CHLORIDE 0.9 % (FLUSH) 0.9 %
10 SYRINGE (ML) INJECTION PRN
Status: DISCONTINUED | OUTPATIENT
Start: 2022-03-09 | End: 2022-03-12 | Stop reason: HOSPADM

## 2022-03-09 RX ADMIN — GADOTERIDOL 20 ML: 279.3 INJECTION, SOLUTION INTRAVENOUS at 08:56

## 2022-03-09 RX ADMIN — SODIUM CHLORIDE, PRESERVATIVE FREE 10 ML: 5 INJECTION INTRAVENOUS at 08:57

## 2022-03-14 ENCOUNTER — OFFICE VISIT (OUTPATIENT)
Dept: ORTHOPEDIC SURGERY | Age: 52
End: 2022-03-14
Payer: COMMERCIAL

## 2022-03-14 VITALS — BODY MASS INDEX: 43.81 KG/M2 | HEIGHT: 70 IN | WEIGHT: 306 LBS | RESPIRATION RATE: 16 BRPM

## 2022-03-14 DIAGNOSIS — G56.00 MEDIAN NERVE COMPRESSION: ICD-10-CM

## 2022-03-14 DIAGNOSIS — M67.40 GANGLION CYST: Primary | ICD-10-CM

## 2022-03-14 PROCEDURE — 99213 OFFICE O/P EST LOW 20 MIN: CPT | Performed by: ORTHOPAEDIC SURGERY

## 2022-03-14 NOTE — PROGRESS NOTES
Chinedu Clinton [facility].  [address] [address]  Dept: [phone]    Ambulatory Orthopedic Consult      CHIEF COMPLAINT:    Chief Complaint   Patient presents with    Wrist Pain     Left        HISTORY OF PRESENT ILLNESS:      The patient is a right hand dominant 45 y.o. male who is being seen for evaluation of the above, which began around 2/28/2021 atraumatically  . At today's visit, he is using no brace/assistive device. History is obtained today from:   [x]  the patient     [x]  EMR     []  one family member/friend    []  multiple family members/friends    []  other:      He reports associated numbness/tingling. INTERVAL HISTORY 3/14/2022:  He is seen again today in the office for follow up of imaging as below. Since being seen last, the patient is doing about the same overall. At today's visit, he is using no brace/assistive device. History is obtained today from:   [x]  the patient     [x]  EMR     []  one family member/friend    []  multiple family members/friends    []  other:         REVIEW OF SYSTEMS:  Musculoskeletal: See HPI for pertinent positives     Past Medical History:    He  has a past medical history of Essential hypertension (8/25/2016), Morbid obesity ([facility] (2/26/2016), and Tobacco abuse. Past Surgical History:    He  has no past surgical history on file.      Current Medications:     Current Outpatient Medications:     Liraglutide (VICTOZA) 18 MG/3ML SOPN SC injection, inject 0.6 milligram subcutaneously daily, Disp: 6 mL, Rfl: 5    metFORMIN (GLUCOPHAGE-XR) 500 MG extended release tablet, take 2 tablets by mouth twice a day, Disp: 120 tablet, Rfl: 5    lisinopril (PRINIVIL;ZESTRIL) 20 MG tablet, take 1 tablet by mouth once daily, Disp: 30 tablet, Rfl: 5    aspirin (ASPIRIN LOW DOSE) 81 MG EC tablet, take 1 tablet by mouth once daily, Disp: 90 tablet, Rfl: 5    Insulin Pen Needle 31G X 6 MM MISC, Use 1 daily with victoza injection, Disp: 100 each, Rfl: 3    naproxen (NAPROSYN) 500 MG tablet, take 1 tablet by mouth twice a day with meals (Patient not taking: Reported on 3/1/2022), Disp: 60 tablet, Rfl: 3    nicotine (NICODERM CQ) 21 MG/24HR, Place 1 patch onto the skin every 24 hours, Disp: 30 patch, Rfl: 3     Allergies:    Patient has no known allergies. Family History:  family history includes Cancer in his father; Diabetes in his mother; Heart Disease in his mother; High Blood Pressure in his mother. Social History:   Social History     Occupational History    Not on file   Tobacco Use    Smoking status: Current Every Day Smoker     Packs/day: 1.00     Years: 35.00     Pack years: 35.00    Smokeless tobacco: Never Used   Substance and Sexual Activity    Alcohol use: No     Alcohol/week: 0.0 standard drinks    Drug use: No    Sexual activity: Not on file          OBJECTIVE:  Resp 16   Ht 5' 10\" (1.778 m)   Wt (!) 306 lb (138.8 kg)   BMI 43.91 kg/m²    Psych: awake, alert  Cardio:  well perfused extremities, no cyanosis  Resp:  normal respiratory effort  Musculoskeletal:    RUE:  Vascular: Limb well perfused, compartments soft/compressible. Skin: No erythema/ulcers. Intact. Neurovascular Status:  Grossly neurovascularly intact throughout   Tenderness to Palpation:        LUE:  Vascular: Limb well perfused, compartments soft/compressible. Skin: No erythema/ulcers. Intact. Neurovascular Status:  Grossly neurovascularly intact throughout   Tenderness to Palpation: Volar radial wrist with associated rubbery nonmobile mass  -Negative Tinel's      RADIOLOGY:   3/14/2022 Prior images reviewed for reference. MRI images and radiology report reviewed, as below:    1. Ganglion cyst measuring 2.2 x 1.6 cm subjacent to the soft tissue marker   at the radial aspect of the wrist, likely arising from the radiocarpal joint. 2. Mild osteoarthrosis and degenerative changes as above. 3. No acute osseous abnormality. 4. Degeneration of the TFCC. FINDINGS:  Four views (AP, Oblique, Scaphoid and Lateral) of the left wrist were obtained in the office today and reviewed, revealing no acute fracture, dislocation, or radioopaque foreign body/tumor. Overall alignment is satisfactory. IMPRESSION:  No acute fracture/dislocation. Electronically signed by Sofia Claudio MD        Relevant previous imaging reviewed, both imaging and report(s) as below:    No results found. ASSESSMENT AND PLAN:  Body mass index is 43.91 kg/m². He has left wrist pain with overlying soft tissue mass, likely secondary to a ganglion cyst, most likely arising from the radiocarpal joint. He does have signs/symptoms of median nerve compression. On review her MRI, there are no obvious malignant features; there is also noted to be degenerative changes at the [facility] joint of the thumb. Notably, he has the past medical history as above. He has a history of low vitamin D, non-insulin-dependent diabetes, and tobacco use (smokes 1 pack of cigarettes per day). We had a discussion today about the likely diagnosis and its natural history, physical exam and imaging findings, as well as various treatment options in detail. Surgically, we again discussed a possible left wrist cyst excision with possible nerve decompression. At today's visit, we also discussed an aspiration; we discussed the risk/benefits of this, including possible neurovascular injury, as well as recurrence. We discussed that this may not provide as much symptomatic relief for his nerve compression as with an open surgical excision.     Given the patient's particular issue, the patient was provided a referral to see an orthopedic hand surgeon, as I do believe that this is in the patient's best interest.  We discussed that they may discussed both surgical and nonsurgical treatment options, and decide on the best treatment course for the patient. Orders/referrals were placed as below at today's visit. All questions were answered and the above plan was agreed upon. The patient will return to clinic in the future as needed. At the patient's next visit, depending on how the patient is doing and/or new imaging/labs results, we may consider the following options:    []  Lace up ankle     []  CAM boot         []  removable wrist brace     []  PT:        []  Wean out immobilization         []  Adv activity      []  Footmind        []  Spenco       []  Custom Orthotic:               []  AZ brace                    []  Rocker Bottom      []  Night splint    []  Heel cups        []  Strap        []  Toe gizmos    []  Topl        []  NSAIDs         []  Juan Carlos        []  Ref:         []  Stress Xray    []  CT        []  MRI  []  Inj:          []  Consider OR      []  Pick OR date    No follow-ups on file. No orders of the defined types were placed in this encounter. Orders Placed This Encounter   Procedures    External Referral To Hand Surgery     Referral Priority:   Urgent     Referral Type:   Eval and Treat     Referral Reason:   Specialty Services Required     Referred to Provider:   Eliza Cowart MD     Requested Specialty:   Hand Surgery     Number of Visits Requested:   1         Ulises Velaczo MD  Orthopedic Surgery        Please excuse any typos/errors, as this note was created with the assistance of voice recognition software. While intending to generate a document that actually reflects the content of the visit, the document can still have some errors including those of syntax and sound-a-like substitutions which may escape proof reading. In such instances, actual meaning can be extrapolated by context.

## 2022-03-21 ENCOUNTER — OFFICE VISIT (OUTPATIENT)
Dept: FAMILY MEDICINE CLINIC | Age: 52
End: 2022-03-21
Payer: COMMERCIAL

## 2022-03-21 VITALS
WEIGHT: 310.6 LBS | SYSTOLIC BLOOD PRESSURE: 143 MMHG | HEART RATE: 86 BPM | TEMPERATURE: 96.4 F | DIASTOLIC BLOOD PRESSURE: 83 MMHG | HEIGHT: 70 IN | BODY MASS INDEX: 44.47 KG/M2

## 2022-03-21 DIAGNOSIS — M25.561 CHRONIC PAIN OF RIGHT KNEE: ICD-10-CM

## 2022-03-21 DIAGNOSIS — G89.29 CHRONIC PAIN OF RIGHT KNEE: ICD-10-CM

## 2022-03-21 DIAGNOSIS — M54.32 SCIATICA OF LEFT SIDE: Primary | ICD-10-CM

## 2022-03-21 PROCEDURE — 99213 OFFICE O/P EST LOW 20 MIN: CPT | Performed by: STUDENT IN AN ORGANIZED HEALTH CARE EDUCATION/TRAINING PROGRAM

## 2022-03-21 RX ORDER — CYCLOBENZAPRINE HCL 10 MG
10 TABLET ORAL 3 TIMES DAILY PRN
Qty: 30 TABLET | Refills: 0 | Status: SHIPPED | OUTPATIENT
Start: 2022-03-21 | End: 2022-03-31

## 2022-03-21 RX ORDER — NAPROXEN 500 MG/1
TABLET ORAL
Qty: 60 TABLET | Refills: 3 | Status: SHIPPED | OUTPATIENT
Start: 2022-03-21

## 2022-03-21 ASSESSMENT — ENCOUNTER SYMPTOMS
SHORTNESS OF BREATH: 0
NAUSEA: 0
BACK PAIN: 1
SINUS PRESSURE: 0
WHEEZING: 0
VOICE CHANGE: 0
VOMITING: 0
ABDOMINAL PAIN: 0
COLOR CHANGE: 0
COUGH: 0
SINUS PAIN: 0
ABDOMINAL DISTENTION: 0

## 2022-03-21 NOTE — PROGRESS NOTES
Subjective:    Debbie Marr is a 45 y.o. male with  has a past medical history of Essential hypertension, Morbid obesity ([address] and Tobacco abuse. Presented to the office today for:  Chief Complaint   Patient presents with    Hip Pain     left hip pain       HPI    Patient is a 50-year-old male with past medical history of type 2 diabetes, hypertension. He is presenting today for left lower leg pain going on for 1 week now. Patient states that last Tuesday he stood up suddenly at work at which time the pain started. Patient rates it 10 out of 10 at its worse. It is intermittent in nature and radiates from his left hip down to the left calf. He states that he is unable to sit straight as pain worsens. He also has to hunch over to help with the pain. He now notes neck pain due to the positioning. He has taken some  pain medication at home which not provide any relief. Patient denies any symptoms of saddle anesthesia, numbness or weakness, difficulty with urination or bowel meds. Patient denies any weight loss. Review of Systems   Constitutional: Negative for fatigue, fever and unexpected weight change. HENT: Negative for sinus pressure, sinus pain and voice change. Eyes: Negative for visual disturbance. Respiratory: Negative for cough, shortness of breath and wheezing. Cardiovascular: Negative for chest pain, palpitations and leg swelling. Gastrointestinal: Negative for abdominal distention, abdominal pain, nausea and vomiting. Endocrine: Negative for polydipsia, polyphagia and polyuria. Genitourinary: Negative for difficulty urinating, flank pain and frequency. Musculoskeletal: Positive for back pain. Left sided intermittent shooting pains   Skin: Negative for color change, rash and wound. Neurological: Negative for dizziness, light-headedness, numbness and headaches. Psychiatric/Behavioral: Negative for confusion and decreased concentration.  The patient is not nervous/anxious. The patient has a   Family History   Problem Relation Age of Onset    Heart Disease Mother     High Blood Pressure Mother     Diabetes Mother     Cancer Father        Objective:    BP (!) 143/83 (Site: Left Upper Arm, Position: Sitting, Cuff Size: Large Adult)   Pulse 86   Temp 96.4 °F (35.8 °C) (Temporal)   Ht 5' 10\" (1.778 m)   Wt (!) 310 lb 9.6 oz (140.9 kg)   BMI 44.57 kg/m²    BP Readings from Last 3 Encounters:   03/21/22 (!) 143/83   03/01/22 138/77   02/15/22 (!) 150/90       Physical Exam  Constitutional:       Appearance: He is well-developed. HENT:      Head: Normocephalic and atraumatic. Eyes:      Pupils: Pupils are equal, round, and reactive to light. Cardiovascular:      Rate and Rhythm: Normal rate and regular rhythm. Heart sounds: Normal heart sounds. No murmur heard. No friction rub. No gallop. Pulmonary:      Effort: Pulmonary effort is normal. No respiratory distress. Breath sounds: Normal breath sounds. No wheezing or rales. Chest:      Chest wall: No tenderness. Abdominal:      General: Bowel sounds are normal. There is no distension. Palpations: Abdomen is soft. Tenderness: There is no abdominal tenderness. Musculoskeletal:         General: Tenderness (tenderness to palpation in the left paraspinal muscles. straight leg test positive on the left side.) present. No swelling or deformity. Right lower leg: No edema. Comments: Range of motion of the lower extremities restricted secondary to severe tenderness in the lumbar back    Skin:     General: Skin is warm. Findings: No erythema or rash. Neurological:      General: No focal deficit present. Mental Status: He is alert and oriented to person, place, and time. Cranial Nerves: No cranial nerve deficit. Sensory: No sensory deficit. Motor: No weakness.       Coordination: Coordination normal.         Lab Results   Component Value Date    WBC 6.3 03/12/2016    HGB 15.9 03/12/2016    HCT 47.0 03/12/2016     03/12/2016    CHOL 216 (H) 03/12/2016    TRIG 84 03/12/2016    HDL 43 04/03/2021    ALT 21 03/12/2016    AST 19 03/12/2016     (L) 04/03/2021    K 4.6 04/03/2021    CL 99 04/03/2021    CREATININE 0.99 03/09/2022    BUN 21 (H) 04/03/2021    CO2 24 04/03/2021    TSH 1.73 03/12/2016    LABA1C 6.0 10/26/2021    LABMICR 154 (H) 04/03/2021     Lab Results   Component Value Date    CALCIUM 9.4 04/03/2021     Lab Results   Component Value Date    LDLCHOLESTEROL 116 04/03/2021       Assessment and Plan:    1. Sciatica of left side  - will try flexeril and naproxen at this time. If not improvement in a week then will consider imaging  - cyclobenzaprine (FLEXERIL) 10 MG tablet; Take 1 tablet by mouth 3 times daily as needed for Muscle spasms  Dispense: 30 tablet; Refill: 0  - naproxen (NAPROSYN) 500 MG tablet; take 1 tablet by mouth twice a day with meals  Dispense: 60 tablet; Refill: 3    2. Chronic pain of right knee  - naproxen (NAPROSYN) 500 MG tablet; take 1 tablet by mouth twice a day with meals  Dispense: 60 tablet; Refill: 3          Requested Prescriptions     Signed Prescriptions Disp Refills    cyclobenzaprine (FLEXERIL) 10 MG tablet 30 tablet 0     Sig: Take 1 tablet by mouth 3 times daily as needed for Muscle spasms    naproxen (NAPROSYN) 500 MG tablet 60 tablet 3     Sig: take 1 tablet by mouth twice a day with meals       Medications Discontinued During This Encounter   Medication Reason    naproxen (NAPROSYN) 500 MG tablet REORDER       Arnaldo received counseling on the following healthy behaviors: nutrition, exercise and medication adherence    Discussed use,benefit, and side effects of prescribed medications. Barriers to medication compliance addressed. All patient questions answered. Pt voiced understanding. Return in about 2 months (around 5/21/2022).         Disclaimer: Some orall of this note was transcribed using voice-recognition software. This may cause typographical errors occasionally. Although all effort is made to fix these errors, please do not hesitate to contact our office if there Nohemi Neigh concern with the understanding of this note.

## 2022-03-21 NOTE — PROGRESS NOTES
Visit Information    Have you changed or started any medications since your last visit including any over-the-counter medicines, vitamins, or herbal medicines? no   Have you stopped taking any of your medications? Is so, why? -  no  Are you having any side effects from any of your medications? - no    Have you seen any other physician or provider since your last visit? yes - Ortho   Have you had any other diagnostic tests since your last visit? yes - Labs, XR, MRI   Have you been seen in the emergency room and/or had an admission in a hospital since we last saw you?  yes - .Román   Have you had your routine dental cleaning in the past 6 months?  no     Do you have an active MyChart account? If no, what is the barrier?   No: Declines    Patient Care Team:  Alejandro Shore MD as PCP - General (Family Medicine)  Vidal Marie MD as PCP - [facility] Laura Santos Provider    Medical History Review  Past Medical, Family, and Social History reviewed and does not contribute to the patient presenting condition    Health Maintenance   Topic Date Due    Diabetic foot exam  Never done    Hepatitis B vaccine (1 of 3 - Risk 3-dose series) Never done    Low dose CT lung screening  Never done    Flu vaccine (1) 09/01/2021    Diabetic microalbuminuria test  04/03/2022    Lipid screen  04/03/2022    Potassium monitoring  04/03/2022    Depression Screen  04/22/2022    COVID-19 Vaccine (3 - Booster for Pfizer series) 06/12/2022    A1C test (Diabetic or Prediabetic)  10/26/2022    Diabetic retinal exam  02/25/2023    Creatinine monitoring  03/09/2023    Colorectal Cancer Screen  05/10/2024    DTaP/Tdap/Td vaccine (2 - Td or Tdap) 08/25/2026    Pneumococcal 0-64 years Vaccine (2 of 2 - PPSV23) 08/23/2034    Shingles Vaccine  Completed    Hepatitis C screen  Completed    HIV screen  Completed    Hepatitis A vaccine  Aged Out    Hib vaccine  Aged Out    Meningococcal (ACWY) vaccine  Aged Out

## 2022-03-21 NOTE — PROGRESS NOTES
I have reviewed and discussed key elements of [employer] with the resident including plan of care and follow up and agree with the care jaida plan. Diagnosis Orders   1. Sciatica of left side  cyclobenzaprine (FLEXERIL) 10 MG tablet    naproxen (NAPROSYN) 500 MG tablet   2.  Chronic pain of right knee  naproxen (NAPROSYN) 500 MG tablet

## 2022-03-21 NOTE — PATIENT INSTRUCTIONS
Thank you for letting us take care of you today. We hope all your questions were addressed. If a question was overlooked or something else comes to mind after you return home, please contact a member of your Care Team listed below. Your Care Team at [place] [address] is Team #4  Carmelita Landis MD (Faculty)  Meryl West MD (Resident)  Emily Shay MD (Resident)  Estefany Archer MD (Resident)  Cherl Frankel, MD (Resident)  ZO Beltran, Tarah Glasgow, [facility])  Karrie Hand, [address] (Clinical Practice Manager)  Mandy Solomon [facility] (Clinical Pharmacist)       Office phone number: [phone]    If you need to get in right away due to illness, please be advised we have \"Same Day\" appointments available Monday-Friday. Please call us at [phone] option #3 to schedule your \"Same Day\" appointment.

## 2022-04-11 LAB
GLUCOSE BLD-MCNC: 102 MG/DL (ref 70–100)
GLUCOSE BLD-MCNC: 92 MG/DL (ref 70–100)

## 2022-04-26 ENCOUNTER — TELEPHONE (OUTPATIENT)
Dept: FAMILY MEDICINE CLINIC | Age: 52
End: 2022-04-26

## 2022-04-26 ENCOUNTER — OFFICE VISIT (OUTPATIENT)
Dept: FAMILY MEDICINE CLINIC | Age: 52
End: 2022-04-26
Payer: COMMERCIAL

## 2022-04-26 VITALS
HEART RATE: 92 BPM | DIASTOLIC BLOOD PRESSURE: 75 MMHG | HEIGHT: 70 IN | WEIGHT: 312.2 LBS | TEMPERATURE: 97.2 F | BODY MASS INDEX: 44.69 KG/M2 | SYSTOLIC BLOOD PRESSURE: 126 MMHG

## 2022-04-26 DIAGNOSIS — E08.3293 DIABETES MELLITUS DUE TO UNDERLYING CONDITION WITH MILD NONPROLIFERATIVE RETINOPATHY OF BOTH EYES, WITHOUT LONG-TERM CURRENT USE OF INSULIN, MACULAR EDEMA PRESENCE UNSPECIFIED (HCC): ICD-10-CM

## 2022-04-26 DIAGNOSIS — M54.50 LUMBAR BACK PAIN: Primary | ICD-10-CM

## 2022-04-26 DIAGNOSIS — R10.31 RIGHT GROIN PAIN: ICD-10-CM

## 2022-04-26 LAB — HBA1C MFR BLD: 7.1 %

## 2022-04-26 PROCEDURE — 83036 HEMOGLOBIN GLYCOSYLATED A1C: CPT | Performed by: STUDENT IN AN ORGANIZED HEALTH CARE EDUCATION/TRAINING PROGRAM

## 2022-04-26 PROCEDURE — 99213 OFFICE O/P EST LOW 20 MIN: CPT | Performed by: STUDENT IN AN ORGANIZED HEALTH CARE EDUCATION/TRAINING PROGRAM

## 2022-04-26 RX ORDER — LIRAGLUTIDE 6 MG/ML
1.2 INJECTION SUBCUTANEOUS DAILY
Qty: 6 ML | Refills: 5 | Status: SHIPPED | OUTPATIENT
Start: 2022-04-26 | End: 2022-04-26

## 2022-04-26 RX ORDER — LIRAGLUTIDE 6 MG/ML
0.6 INJECTION SUBCUTANEOUS DAILY
Qty: 6 ML | Refills: 5 | Status: SHIPPED | OUTPATIENT
Start: 2022-04-26 | End: 2022-05-03 | Stop reason: SDUPTHER

## 2022-04-26 RX ORDER — METFORMIN HYDROCHLORIDE 500 MG/1
1000 TABLET, EXTENDED RELEASE ORAL 2 TIMES DAILY
Qty: 120 TABLET | Refills: 5 | Status: SHIPPED | OUTPATIENT
Start: 2022-04-26

## 2022-04-26 SDOH — ECONOMIC STABILITY: FOOD INSECURITY: WITHIN THE PAST 12 MONTHS, YOU WORRIED THAT YOUR FOOD WOULD RUN OUT BEFORE YOU GOT MONEY TO BUY MORE.: NEVER TRUE

## 2022-04-26 SDOH — ECONOMIC STABILITY: FOOD INSECURITY: WITHIN THE PAST 12 MONTHS, THE FOOD YOU BOUGHT JUST DIDN'T LAST AND YOU DIDN'T HAVE MONEY TO GET MORE.: NEVER TRUE

## 2022-04-26 ASSESSMENT — PATIENT HEALTH QUESTIONNAIRE - PHQ9
2. FEELING DOWN, DEPRESSED OR HOPELESS: 0
1. LITTLE INTEREST OR PLEASURE IN DOING THINGS: 1
SUM OF ALL RESPONSES TO PHQ9 QUESTIONS 1 & 2: 1
SUM OF ALL RESPONSES TO PHQ QUESTIONS 1-9: 1

## 2022-04-26 ASSESSMENT — ENCOUNTER SYMPTOMS
WHEEZING: 0
NAUSEA: 0
SINUS PRESSURE: 0
BACK PAIN: 1
ABDOMINAL DISTENTION: 0
VOMITING: 0
SHORTNESS OF BREATH: 0
COUGH: 0
SINUS PAIN: 0
VOICE CHANGE: 0
ABDOMINAL PAIN: 0
COLOR CHANGE: 0

## 2022-04-26 ASSESSMENT — SOCIAL DETERMINANTS OF HEALTH (SDOH): HOW HARD IS IT FOR YOU TO PAY FOR THE VERY BASICS LIKE FOOD, HOUSING, MEDICAL CARE, AND HEATING?: NOT HARD AT ALL

## 2022-04-26 NOTE — PROGRESS NOTES
Visit Information    Have you changed or started any medications since your last visit including any over-the-counter medicines, vitamins, or herbal medicines? no   Have you stopped taking any of your medications? Is so, why? -  no  Are you having any side effects from any of your medications? - no    Have you seen any other physician or provider since your last visit?  no   Have you had any other diagnostic tests since your last visit?  no   Have you been seen in the emergency room and/or had an admission in a hospital since we last saw you?  no   Have you had your routine dental cleaning in the past 6 months?  no     Do you have an active MyChart account? If no, what is the barrier?   No: declined     Patient Care Team:  Taina Jack MD as PCP - General (Family Medicine)  Omayra Cortez MD as PCP - [facility] Provider    Medical History Review  Past Medical, Family, and Social History reviewed and does not contribute to the patient presenting condition    Health Maintenance   Topic Date Due    Diabetic foot exam  Never done    Hepatitis B vaccine (1 of 3 - Risk 3-dose series) Never done    Pneumococcal 0-64 years Vaccine (2 - PCV) 08/25/2017    Low dose CT lung screening  Never done    Diabetic microalbuminuria test  04/03/2022    Lipids  04/03/2022    Potassium  04/03/2022    Depression Screen  04/22/2022    COVID-19 Vaccine (3 - Booster for [place] series) 06/12/2022    Flu vaccine (Season Ended) 09/01/2022    A1C test (Diabetic or Prediabetic)  10/26/2022    Diabetic retinal exam  02/25/2023    Creatinine  03/09/2023    Colorectal Cancer Screen  05/10/2024    DTaP/Tdap/Td vaccine (2 - Td or Tdap) 08/25/2026    Shingles Vaccine  Completed    Hepatitis C screen  Completed    HIV screen  Completed    Hepatitis A vaccine  Aged Out    Hib vaccine  Aged Out    Meningococcal (ACWY) vaccine  Aged Out

## 2022-04-26 NOTE — PROGRESS NOTES
Attending Physician Statement  I have discussed the care of [address] [place], [place] [address]on [place], including pertinent history and exam findings,  with the resident. I have reviewed the key elements of all parts of the encounter with the resident. I agree with the assessment, plan and orders as documented by the resident.   (Dino Lewis)    Gial Fagan MD

## 2022-04-26 NOTE — PROGRESS NOTES
Subjective:    Jaycob Go is a 45 y.o. male with  has a past medical history of Essential hypertension, Lumbar back pain, Morbid obesity ([address] and Tobacco abuse. Presented to the office today for:  Chief Complaint   Patient presents with    Back Pain     follow up       HPI    Patient is a 58-year-old male with past medical history of hypertension, left-sided performance syndrome. Patient is presenting today with slight improvement of his left-sided sciatica pain after he was prescribed naproxen and muscle relaxers. Patient however states that the pain is constant and and has really been bothering him. He states that the pain radiates more to his left lower leg he also has some aching sensation in the left calf muscle. Patient states that he also experiences some groin pain occasionally in the right groin area. Patient denies any difficulty with urination or bowel movement or any other concerning red flag symptoms at this moment. She denies any trauma. Review of Systems   Constitutional: Negative for fatigue, fever and unexpected weight change. HENT: Negative for sinus pressure, sinus pain and voice change. Eyes: Negative for visual disturbance. Respiratory: Negative for cough, shortness of breath and wheezing. Cardiovascular: Negative for chest pain, palpitations and leg swelling. Gastrointestinal: Negative for abdominal distention, abdominal pain, nausea and vomiting. Endocrine: Negative for polydipsia, polyphagia and polyuria. Genitourinary: Negative for difficulty urinating, flank pain and frequency. Musculoskeletal: Positive for arthralgias and back pain. Negative for joint swelling and myalgias. Stiffness in the bilateral hip joints, tenderness in the left lower lumbar region, cramping in the left calf area. Skin: Negative for color change, rash and wound.    Neurological: Negative for dizziness, tremors, seizures, weakness, light-headedness, numbness and headaches. Psychiatric/Behavioral: Negative for confusion and decreased concentration. The patient is not nervous/anxious. The patient has a   Family History   Problem Relation Age of Onset    Heart Disease Mother     High Blood Pressure Mother     Diabetes Mother     Cancer Father        Objective:    /75 (Site: Right Upper Arm, Position: Sitting, Cuff Size: Large Adult) Comment: machine  Pulse 92   Temp 97.2 °F (36.2 °C) (Temporal)   Ht 5' 10\" (1.778 m)   Wt (!) 312 lb 3.2 oz (141.6 kg)   BMI 44.80 kg/m²    BP Readings from Last 3 Encounters:   04/26/22 126/75   03/21/22 (!) 143/83   03/01/22 138/77       Physical Exam  Constitutional:       Appearance: He is well-developed. HENT:      Head: Normocephalic and atraumatic. Eyes:      Pupils: Pupils are equal, round, and reactive to light. Cardiovascular:      Rate and Rhythm: Normal rate and regular rhythm. Heart sounds: Normal heart sounds. No murmur heard. No friction rub. No gallop. Pulmonary:      Effort: Pulmonary effort is normal. No respiratory distress. Breath sounds: Normal breath sounds. No wheezing or rales. Chest:      Chest wall: No tenderness. Abdominal:      General: Bowel sounds are normal. There is no distension. Palpations: Abdomen is soft. Tenderness: There is no abdominal tenderness. Musculoskeletal:         General: Tenderness (Tenderness palpation left lumbar region.) present. No swelling, deformity or signs of injury. Right lower leg: No edema. Left lower leg: No edema. Comments: Slightly decreased reflex of the left lower extremity compared to the right. No calf tenderness noted. Varicose veins noted in left lower extremity. No swelling or skin abnormalities noted. Skin:     General: Skin is warm. Findings: No erythema or rash. Neurological:      Mental Status: He is alert and oriented to person, place, and time.          Lab Results   Component Value Date    WBC 6.3 03/12/2016    HGB 15.9 03/12/2016    HCT 47.0 03/12/2016     03/12/2016    CHOL 216 (H) 03/12/2016    TRIG 84 03/12/2016    HDL 43 04/03/2021    ALT 21 03/12/2016    AST 19 03/12/2016     (L) 04/03/2021    K 4.6 04/03/2021    CL 99 04/03/2021    CREATININE 0.99 03/09/2022    BUN 21 (H) 04/03/2021    CO2 24 04/03/2021    TSH 1.73 03/12/2016    LABA1C 7.1 04/26/2022    LABMICR 154 (H) 04/03/2021     Lab Results   Component Value Date    CALCIUM 9.4 04/03/2021     Lab Results   Component Value Date    LDLCHOLESTEROL 116 04/03/2021       Assessment and Plan:    1. 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  - Liraglutide (VICTOZA) 18 MG/3ML SOPN SC injection; Inject 1.2 mg into the skin daily inject 0.6 milligram subcutaneously daily  Dispense: 6 mL; Refill: 5  - metFORMIN (GLUCOPHAGE-XR) 500 MG extended release tablet; Take 2 tablets by mouth in the morning and at bedtime take 2 tablets by mouth twice a day  Dispense: 120 tablet; Refill: 5    2. Lumbar back pain  - XR LUMBAR SPINE (2-3 VIEWS); Future  - XR HIP LEFT (2-3 VIEWS); Future  - XR HIP RIGHT (2-3 VIEWS); Future  - US DUP LOWER EXTREMITY LEFT RADHA; Future   Follow-up in 1 weekpatient does not want to try physical therapy at this time    3. Right groin pain  - XR LUMBAR SPINE (2-3 VIEWS); Future  - XR HIP LEFT (2-3 VIEWS); Future  - XR HIP RIGHT (2-3 VIEWS);  Future   Follow-up in 1 week          Requested Prescriptions     Signed Prescriptions Disp Refills    Liraglutide (VICTOZA) 18 MG/3ML SOPN SC injection 6 mL 5     Sig: Inject 1.2 mg into the skin daily inject 0.6 milligram subcutaneously daily    metFORMIN (GLUCOPHAGE-XR) 500 MG extended release tablet 120 tablet 5     Sig: Take 2 tablets by mouth in the morning and

## 2022-04-26 NOTE — TELEPHONE ENCOUNTER
----- Message from Apurva Toledo sent at 4/26/2022  4:16 PM EDT -----  Subject: Message to Provider    QUESTIONS  Information for Provider? April with [facility] is calling to clarify a   prescription for this pt from Dr. Jie Marlow. She states the Victoza has two   directions, 0.6mg and 1.2 mg. Please submit which is correct, preferably   electronically. If needed, can call [phone].   ---------------------------------------------------------------------------  --------------  Tez [employer] INFO  What is the best way for the office to contact you? OK to leave message on   voicemail  Preferred Call Back Phone Number? [phone]  ---------------------------------------------------------------------------  --------------  SCRIPT ANSWERS  Relationship to Patient? Third Party  Third Party Type? Pharmacy? Representative Name?  Lenny Rendon

## 2022-04-26 NOTE — PATIENT INSTRUCTIONS
Thank you for letting us take care of you today. We hope all your questions were addressed. If a question was overlooked or something else comes to mind after you return home, please contact a member of your Care Team listed below. Your Care Team at [place] [address] is Team #5  Arben Jean MD (Faculty)  Marquis Hernadez MD (Resident)  Blaire Sethi MD (Resident)  Ford Mai MD (Resident)  Cal Abdi MD (Resident)  Selina Calderon., EMILY Xiong., DIO Andre., Bhupendra Barkley., [facility] office)  Diana Rutherford [place] (Clinical Practice Manager)  Tyrese Adan [facility] (Clinical Pharmacist)       Office phone number: [phone]    If you need to get in right away due to illness, please be advised we have \"Same Day\" appointments available Monday-Friday. Please call us at [phone] option #3 to schedule your \"Same Day\" appointment.

## 2022-04-28 ENCOUNTER — HOSPITAL ENCOUNTER (OUTPATIENT)
Age: 52
Discharge: HOME | End: 2022-04-28
Payer: COMMERCIAL

## 2022-04-28 ENCOUNTER — HOSPITAL ENCOUNTER (OUTPATIENT)
Dept: GENERAL RADIOLOGY | Age: 52
Discharge: HOME | End: 2022-04-30
Payer: COMMERCIAL

## 2022-04-28 ENCOUNTER — HOSPITAL ENCOUNTER (OUTPATIENT)
Age: 52
Discharge: HOME | End: 2022-04-30
Payer: COMMERCIAL

## 2022-04-28 ENCOUNTER — HOSPITAL ENCOUNTER (OUTPATIENT)
Dept: VASCULAR LAB | Age: 52
Discharge: HOME | End: 2022-04-28
Payer: COMMERCIAL

## 2022-04-28 DIAGNOSIS — R10.31 RIGHT GROIN PAIN: ICD-10-CM

## 2022-04-28 DIAGNOSIS — M54.50 LUMBAR BACK PAIN: ICD-10-CM

## 2022-04-28 DIAGNOSIS — I10 ESSENTIAL HYPERTENSION: ICD-10-CM

## 2022-04-28 LAB
ABSOLUTE EOS #: 0.2 K/UL (ref 0–0.4)
ABSOLUTE LYMPH #: 2.1 K/UL (ref 1–4.8)
ABSOLUTE MONO #: 0.7 K/UL (ref 0.1–1.3)
ANION GAP SERPL CALCULATED.3IONS-SCNC: 11 MMOL/L (ref 9–17)
BASOPHILS # BLD: 1 % (ref 0–2)
BASOPHILS ABSOLUTE: 0.1 K/UL (ref 0–0.2)
BUN BLDV-MCNC: 17 MG/DL (ref 6–20)
CALCIUM SERPL-MCNC: 9.4 MG/DL (ref 8.6–10.4)
CHLORIDE BLD-SCNC: 101 MMOL/L (ref 98–107)
CHOLESTEROL/HDL RATIO: 4
CHOLESTEROL: 206 MG/DL
CO2: 25 MMOL/L (ref 20–31)
CREAT SERPL-MCNC: 0.86 MG/DL (ref 0.7–1.2)
EOSINOPHILS RELATIVE PERCENT: 3 % (ref 0–4)
ESTIMATED AVERAGE GLUCOSE: 151 MG/DL
GFR AFRICAN AMERICAN: >60 ML/MIN
GFR NON-AFRICAN AMERICAN: >60 ML/MIN
GFR SERPL CREATININE-BSD FRML MDRD: ABNORMAL ML/MIN/{1.73_M2}
GLUCOSE BLD-MCNC: 141 MG/DL (ref 70–99)
HBA1C MFR BLD: 6.9 % (ref 4–6)
HCT VFR BLD CALC: 45 % (ref 41–53)
HDLC SERPL-MCNC: 51 MG/DL
HEMOGLOBIN: 15.4 G/DL (ref 13.5–17.5)
LDL CHOLESTEROL: 119 MG/DL (ref 0–130)
LYMPHOCYTES # BLD: 29 % (ref 24–44)
MCH RBC QN AUTO: 31.3 PG (ref 26–34)
MCHC RBC AUTO-ENTMCNC: 34.2 G/DL (ref 31–37)
MCV RBC AUTO: 91.3 FL (ref 80–100)
MONOCYTES # BLD: 10 % (ref 1–7)
PDW BLD-RTO: 14.3 % (ref 11.5–14.9)
PLATELET # BLD: 274 K/UL (ref 150–450)
PMV BLD AUTO: 8.1 FL (ref 6–12)
POTASSIUM SERPL-SCNC: 4.6 MMOL/L (ref 3.7–5.3)
RBC # BLD: 4.93 M/UL (ref 4.5–5.9)
SEG NEUTROPHILS: 57 % (ref 36–66)
SEGMENTED NEUTROPHILS ABSOLUTE COUNT: 4 K/UL (ref 1.3–9.1)
SODIUM BLD-SCNC: 137 MMOL/L (ref 135–144)
TRIGL SERPL-MCNC: 182 MG/DL
WBC # BLD: 7.1 K/UL (ref 3.5–11)

## 2022-04-28 PROCEDURE — 36415 COLL VENOUS BLD VENIPUNCTURE: CPT

## 2022-04-28 PROCEDURE — 85025 COMPLETE CBC W/AUTO DIFF WBC: CPT

## 2022-04-28 PROCEDURE — 83036 HEMOGLOBIN GLYCOSYLATED A1C: CPT

## 2022-04-28 PROCEDURE — 73502 X-RAY EXAM HIP UNI 2-3 VIEWS: CPT

## 2022-04-28 PROCEDURE — 80048 BASIC METABOLIC PNL TOTAL CA: CPT

## 2022-04-28 PROCEDURE — 93971 EXTREMITY STUDY: CPT

## 2022-04-28 PROCEDURE — 72100 X-RAY EXAM L-S SPINE 2/3 VWS: CPT

## 2022-04-28 PROCEDURE — 80061 LIPID PANEL: CPT

## 2022-05-02 RX ORDER — PEN NEEDLE, DIABETIC 29 G X1/2"
NEEDLE, DISPOSABLE MISCELLANEOUS
Qty: 100 EACH | Refills: 3 | Status: SHIPPED | OUTPATIENT
Start: 2022-05-02

## 2022-05-02 NOTE — TELEPHONE ENCOUNTER
E-scribe request for med refill. Please review and e-scribe if applicable. Last Visit Date:  04/26/2022  Next Visit Date:  5/6/2022    Hemoglobin A1C (%)   Date Value   04/28/2022 6.9 (H)   04/26/2022 7.1   10/26/2021 6.0             ( goal A1C is < 7)   Microalb/Crt.  Ratio (mcg/mg creat)   Date Value   04/03/2021 154 (H)     LDL Cholesterol (mg/dL)   Date Value   04/28/2022 119       (goal LDL is <100)   AST (U/L)   Date Value   03/12/2016 19     ALT (U/L)   Date Value   03/12/2016 21     BUN (mg/dL)   Date Value   04/28/2022 17     BP Readings from Last 3 Encounters:   04/26/22 126/75   03/21/22 (!) 143/83   03/01/22 138/77          (goal 120/80)        Patient Active Problem List:     Elevated blood pressure reading     Smoker     Morbid obesity ([address]     Need for vaccination     Right arm numbness     Hypovitaminosis D     Tobacco abuse     Essential hypertension     Smoking     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

## 2022-05-03 ENCOUNTER — TELEPHONE (OUTPATIENT)
Dept: FAMILY MEDICINE CLINIC | Age: 52
End: 2022-05-03

## 2022-05-03 DIAGNOSIS — E08.3293 DIABETES MELLITUS DUE TO UNDERLYING CONDITION WITH MILD NONPROLIFERATIVE RETINOPATHY OF BOTH EYES, WITHOUT LONG-TERM CURRENT USE OF INSULIN, MACULAR EDEMA PRESENCE UNSPECIFIED (HCC): ICD-10-CM

## 2022-05-03 RX ORDER — LIRAGLUTIDE 6 MG/ML
0.6 INJECTION SUBCUTANEOUS DAILY
Qty: 6 ML | Refills: 5 | Status: SHIPPED | OUTPATIENT
Start: 2022-05-03

## 2022-05-03 NOTE — TELEPHONE ENCOUNTER
Spoke with patient and informed him of his US results. During call, patient asked writer about his XR results. Please advise or call patient in regards to his XR results.

## 2022-05-03 NOTE — TELEPHONE ENCOUNTER
----- Message from Tanisha Burden sent at 5/2/2022  8:41 AM EDT -----  Subject: Results Request    QUESTIONS  Which lab or imaging result is the patient calling about? X-ray and   Doppler scan   Which provider ordered the test?   At what location was the test performed? Date the test was performed? Additional Information for Provider? Patient is calling requesting his lab   results . Unable to contact office at this time. Please call pt with these   results. ---------------------------------------------------------------------------  --------------  Nancy [facility] INFO  What is the best way for the office to contact you? OK to leave message on   voicemail  Preferred Call Back Phone Number? [phone]  ---------------------------------------------------------------------------  --------------  SCRIPT ANSWERS  Relationship to Patient?  Self

## 2022-05-03 NOTE — TELEPHONE ENCOUNTER
----- Message from Tisha Acuna MD sent at 4/29/2022  8:59 AM EDT -----  Danny Espinoza,     Could you please inform patient that his US of the left leg showed no clots? Thanks!

## 2022-05-05 DIAGNOSIS — M54.50 LUMBAR BACK PAIN: Primary | ICD-10-CM

## 2022-05-05 DIAGNOSIS — E78.2 MIXED HYPERLIPIDEMIA: ICD-10-CM

## 2022-05-05 RX ORDER — CYCLOBENZAPRINE HCL 10 MG
10 TABLET ORAL NIGHTLY PRN
Qty: 30 TABLET | Refills: 0 | Status: SHIPPED | OUTPATIENT
Start: 2022-05-05 | End: 2022-05-15

## 2022-05-05 RX ORDER — ROSUVASTATIN CALCIUM 20 MG/1
20 TABLET, COATED ORAL DAILY
Qty: 30 TABLET | Refills: 5 | Status: SHIPPED | OUTPATIENT
Start: 2022-05-05 | End: 2022-10-26

## 2022-05-17 ENCOUNTER — TELEPHONE (OUTPATIENT)
Dept: FAMILY MEDICINE CLINIC | Age: 52
End: 2022-05-17

## 2022-05-17 NOTE — TELEPHONE ENCOUNTER
----- Message from [facility] sent at 5/16/2022  1:22 PM EDT -----  Subject: Message to Provider    QUESTIONS  Information for Provider? pt went urgent care on sunday and they diagnosed   him with a kidney stone. They gave him a shot of tramadol to help with it. He is in terrible pain now and what to know what he can do about it in the   mean time. They have him Flomax, but he says that's not helping with the   pain. [facility] [address] [place] [address], [address] [place] [address] - F [phone]  ---------------------------------------------------------------------------  --------------  Karen Nixon INFO  What is the best way for the office to contact you? OK to leave message on   voicemail  Preferred Call Back Phone Number? [phone]  ---------------------------------------------------------------------------  --------------  SCRIPT ANSWERS  Relationship to Patient?  Self

## 2022-05-17 NOTE — TELEPHONE ENCOUNTER
Danny Mancini,     Could you please tell the patient to go the ER to get this assessed? He might need admission. Thanks!

## 2022-08-26 DIAGNOSIS — I10 ESSENTIAL HYPERTENSION: ICD-10-CM

## 2022-08-26 RX ORDER — LISINOPRIL 20 MG/1
TABLET ORAL
Qty: 30 TABLET | Refills: 5 | Status: SHIPPED | OUTPATIENT
Start: 2022-08-26

## 2022-08-26 NOTE — TELEPHONE ENCOUNTER
E-scribe request for med refill. Please review and e-scribe if applicable. Last Visit Date:  04/26/2022  Next Visit Date:  Visit date not found    Hemoglobin A1C (%)   Date Value   04/28/2022 6.9 (H)   04/26/2022 7.1   10/26/2021 6.0             ( goal A1C is < 7)   Microalb/Crt.  Ratio (mcg/mg creat)   Date Value   04/03/2021 154 (H)     LDL Cholesterol (mg/dL)   Date Value   04/28/2022 119       (goal LDL is <100)   AST (U/L)   Date Value   03/12/2016 19     ALT (U/L)   Date Value   03/12/2016 21     BUN (mg/dL)   Date Value   04/28/2022 17     BP Readings from Last 3 Encounters:   04/26/22 126/75   03/21/22 (!) 143/83   03/01/22 138/77          (goal 120/80)        Patient Active Problem List:     Elevated blood pressure reading     Smoker     Morbid obesity ([address]     Need for vaccination     Right arm numbness     Hypovitaminosis D     Tobacco abuse     Essential hypertension     Smoking     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

## 2022-10-26 DIAGNOSIS — E78.2 MIXED HYPERLIPIDEMIA: ICD-10-CM

## 2022-10-26 RX ORDER — ROSUVASTATIN CALCIUM 20 MG/1
TABLET, COATED ORAL
Qty: 30 TABLET | Refills: 5 | Status: SHIPPED | OUTPATIENT
Start: 2022-10-26

## 2022-10-26 NOTE — TELEPHONE ENCOUNTER
E-scribe request for med refill. Please review and e-scribe if applicable. Last Visit Date:  04/26/2022  Next Visit Date:  Visit date not found    Hemoglobin A1C (%)   Date Value   04/28/2022 6.9 (H)   04/26/2022 7.1   10/26/2021 6.0             ( goal A1C is < 7)   Microalb/Crt.  Ratio (mcg/mg creat)   Date Value   04/03/2021 154 (H)     LDL Cholesterol (mg/dL)   Date Value   04/28/2022 119       (goal LDL is <100)   AST (U/L)   Date Value   03/12/2016 19     ALT (U/L)   Date Value   03/12/2016 21     BUN (mg/dL)   Date Value   04/28/2022 17     BP Readings from Last 3 Encounters:   04/26/22 126/75   03/21/22 (!) 143/83   03/01/22 138/77          (goal 120/80)        Patient Active Problem List:     Elevated blood pressure reading     Smoker     Morbid obesity ([address]     Need for vaccination     Right arm numbness     Hypovitaminosis D     Tobacco abuse     Essential hypertension     Smoking     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

## 2022-12-30 DIAGNOSIS — E08.3293 DIABETES MELLITUS DUE TO UNDERLYING CONDITION WITH MILD NONPROLIFERATIVE RETINOPATHY OF BOTH EYES, WITHOUT LONG-TERM CURRENT USE OF INSULIN, MACULAR EDEMA PRESENCE UNSPECIFIED (HCC): ICD-10-CM

## 2022-12-30 RX ORDER — LIRAGLUTIDE 6 MG/ML
INJECTION SUBCUTANEOUS
Qty: 6 ML | Refills: 5 | Status: SHIPPED | OUTPATIENT
Start: 2022-12-30

## 2022-12-30 NOTE — TELEPHONE ENCOUNTER
E-scribe request for med refill. Please review and e-scribe if applicable. Last Visit Date:  04/26/2022  Next Visit Date:  Visit date not found    Hemoglobin A1C (%)   Date Value   04/28/2022 6.9 (H)   04/26/2022 7.1   10/26/2021 6.0             ( goal A1C is < 7)   Microalb/Crt.  Ratio (mcg/mg creat)   Date Value   04/03/2021 154 (H)     LDL Cholesterol (mg/dL)   Date Value   04/28/2022 119       (goal LDL is <100)   AST (U/L)   Date Value   03/12/2016 19     ALT (U/L)   Date Value   03/12/2016 21     BUN (mg/dL)   Date Value   04/28/2022 17     BP Readings from Last 3 Encounters:   04/26/22 126/75   03/21/22 (!) 143/83   03/01/22 138/77          (goal 120/80)        Patient Active Problem List:     Elevated blood pressure reading     Smoker     Morbid obesity ([address]     Need for vaccination     Right arm numbness     Hypovitaminosis D     Tobacco abuse     Essential hypertension     Smoking     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

## 2023-02-21 ENCOUNTER — OFFICE VISIT (OUTPATIENT)
Dept: FAMILY MEDICINE CLINIC | Age: 53
End: 2023-02-21
Payer: COMMERCIAL

## 2023-02-21 VITALS
DIASTOLIC BLOOD PRESSURE: 87 MMHG | WEIGHT: 294 LBS | SYSTOLIC BLOOD PRESSURE: 139 MMHG | BODY MASS INDEX: 42.09 KG/M2 | HEART RATE: 85 BPM | HEIGHT: 70 IN

## 2023-02-21 DIAGNOSIS — R20.2 NUMBNESS AND TINGLING OF LOWER EXTREMITY: ICD-10-CM

## 2023-02-21 DIAGNOSIS — R20.0 NUMBNESS AND TINGLING OF LOWER EXTREMITY: ICD-10-CM

## 2023-02-21 DIAGNOSIS — E08.3293 DIABETES MELLITUS DUE TO UNDERLYING CONDITION WITH MILD NONPROLIFERATIVE RETINOPATHY OF BOTH EYES, WITHOUT LONG-TERM CURRENT USE OF INSULIN, MACULAR EDEMA PRESENCE UNSPECIFIED (HCC): Primary | ICD-10-CM

## 2023-02-21 LAB — HBA1C MFR BLD: 9 %

## 2023-02-21 PROCEDURE — 83036 HEMOGLOBIN GLYCOSYLATED A1C: CPT

## 2023-02-21 SDOH — ECONOMIC STABILITY: FOOD INSECURITY: WITHIN THE PAST 12 MONTHS, THE FOOD YOU BOUGHT JUST DIDN'T LAST AND YOU DIDN'T HAVE MONEY TO GET MORE.: NEVER TRUE

## 2023-02-21 SDOH — ECONOMIC STABILITY: INCOME INSECURITY: HOW HARD IS IT FOR YOU TO PAY FOR THE VERY BASICS LIKE FOOD, HOUSING, MEDICAL CARE, AND HEATING?: NOT HARD AT ALL

## 2023-02-21 SDOH — ECONOMIC STABILITY: HOUSING INSECURITY
IN THE LAST 12 MONTHS, WAS THERE A TIME WHEN YOU DID NOT HAVE A STEADY PLACE TO SLEEP OR SLEPT IN A SHELTER (INCLUDING NOW)?: NO

## 2023-02-21 SDOH — ECONOMIC STABILITY: FOOD INSECURITY: WITHIN THE PAST 12 MONTHS, YOU WORRIED THAT YOUR FOOD WOULD RUN OUT BEFORE YOU GOT MONEY TO BUY MORE.: NEVER TRUE

## 2023-02-21 ASSESSMENT — ENCOUNTER SYMPTOMS
WHEEZING: 0
SHORTNESS OF BREATH: 0
ABDOMINAL PAIN: 0
BACK PAIN: 1
VOMITING: 0
NAUSEA: 0

## 2023-02-21 ASSESSMENT — PATIENT HEALTH QUESTIONNAIRE - PHQ9
SUM OF ALL RESPONSES TO PHQ QUESTIONS 1-9: 0
1. LITTLE INTEREST OR PLEASURE IN DOING THINGS: 0
SUM OF ALL RESPONSES TO PHQ QUESTIONS 1-9: 0
SUM OF ALL RESPONSES TO PHQ9 QUESTIONS 1 & 2: 0
2. FEELING DOWN, DEPRESSED OR HOPELESS: 0
SUM OF ALL RESPONSES TO PHQ QUESTIONS 1-9: 0
SUM OF ALL RESPONSES TO PHQ QUESTIONS 1-9: 0

## 2023-02-21 NOTE — PROGRESS NOTES
[facility] 44    Family Medicine Residency Program - Outpatient Note      Subjective:    Desi Mejia is a 47 y.o. male with  has a past medical history of Essential hypertension, Lumbar back pain, Morbid obesity ([address] and Tobacco abuse. Presented to the office today for:  Chief Complaint   Patient presents with    Diabetes     Pt last seen on 4/26/2022, in need of medication management . Sabino Neal Pt has 10 days worth of glucose readings    Numbness     Numbness in Right Thigh with tingling between hip and knee, started over the weekend       HPI  Is a 76-year-old male with past medical history of hypertension, diabetes. Patient reports his home blood glucose readings are high, (patient had a paper with blood glucose readings which was reviewed during encounter)  Patient reports that he is getting symptoms (polyuria, tiredness) again and his glucose numbers getting higher  A1c today 9.0 today was 6.9 about 9 months ago    Metformin 1g bid  Victoza 0.6 mg daily morning  Crestor  20 mg  Lisinopril 20 mg    Patient reports it is difficult for him to inject Victoza daily, his insurance is changed now and wants to know if it will cover Susana Mckeon there was original plan to start him on Jardiance for diabetes but insurance did not cover so he was put on Victoza instead     Patient had an eye exam couple of months ago, reports everything was okay    Patient also has a history of sciatica, reports he has numbness in the small area of his right thigh between hip and knee. Review of Systems   Constitutional: Negative. HENT: Negative. Respiratory:  Negative for shortness of breath and wheezing. Cardiovascular:  Negative for chest pain, palpitations and leg swelling. Gastrointestinal:  Negative for abdominal pain, nausea and vomiting. Endocrine: Positive for polyuria. Musculoskeletal:  Positive for back pain. Neurological:  Positive for numbness (right thigh).    Psychiatric/Behavioral: Negative. The patient has a   Family History   Problem Relation Age of Onset    Heart Disease Mother     High Blood Pressure Mother     Diabetes Mother     Cancer Father        Objective:    /87 (Site: Right Upper Arm, Position: Sitting, Cuff Size: Medium Adult)   Pulse 85   Ht 5' 10\" (1.778 m)   Wt 294 lb (133.4 kg)   BMI 42.18 kg/m²    BP Readings from Last 3 Encounters:   02/21/23 139/87   04/26/22 126/75   03/21/22 (!) 143/83       Physical Exam  Vitals reviewed. Cardiovascular:      Rate and Rhythm: Normal rate and regular rhythm. Pulses: Normal pulses. Heart sounds: Normal heart sounds. Pulmonary:      Effort: Pulmonary effort is normal.      Breath sounds: Normal breath sounds. Abdominal:      General: Bowel sounds are normal.      Palpations: Abdomen is soft. Skin:     Comments: Diabetic foot exam, 10 sites tested right and left foot each, sensation intact   Neurological:      Mental Status: He is alert and oriented to person, place, and time. Lab Results   Component Value Date    WBC 7.1 04/28/2022    HGB 15.4 04/28/2022    HCT 45.0 04/28/2022     04/28/2022    CHOL 206 (H) 04/28/2022    TRIG 182 (H) 04/28/2022    HDL 51 04/28/2022    ALT 21 03/12/2016    AST 19 03/12/2016     04/28/2022    K 4.6 04/28/2022     04/28/2022    CREATININE 0.86 04/28/2022    BUN 17 04/28/2022    CO2 25 04/28/2022    TSH 1.73 03/12/2016    LABA1C 9.0 02/21/2023    LABMICR 154 (H) 04/03/2021     Lab Results   Component Value Date    CALCIUM 9.4 04/28/2022     Lab Results   Component Value Date    LDLCHOLESTEROL 119 04/28/2022       Assessment and Plan:    1. 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   Per patient preference we will start him on Jardiance 10 mg once daily and follow back in 1 month. Recommended taking blood glucose readings and will review at next visit. Effects of Jardiance was explained in detail and patient voiced understanding. It was explained that patient is not to stop victoza right now but when he gets his Jardiance then he can stop taking Victoza. - POCT glycosylated hemoglobin (Hb A1C)  - empagliflozin (JARDIANCE) 10 MG tablet; Take 1 tablet by mouth daily  Dispense: 30 tablet; Refill: 1    2. Numbness and tingling of right lower extremity  Denies urine or bowel incontinence, loss of sensation, saddle anesthesia. No red flags, no deficits. x-ray lumbar spine on 4/26/2022 shows moderate multilevel facet arthrosis, mild multilevel disc space narrowing and moderate multilevel hypertrophic osteophyte spur formation within the thoracic and lumbar spine. Physical therapy was discussed with patient, he has been through some sessions and is not interested in physical therapy right now. Reports symptoms are not bothering him as much and we will discuss further at next visit. Requested Prescriptions     Signed Prescriptions Disp Refills    empagliflozin (JARDIANCE) 10 MG tablet 30 tablet 1     Sig: Take 1 tablet by mouth daily       Medications Discontinued During This Encounter   Medication Reason    naproxen (NAPROSYN) 500 MG tablet Therapy completed       Arnaldo received counseling on the following healthy behaviors: nutrition, exercise and medication adherence    Discussed use,benefit, and side effects of prescribed medications. Barriers to medication compliance addressed. All patient questions answered. Pt voiced understanding. Return in about 1 month (around 3/21/2023) for DM f/u. Disclaimer: Some orall of this note was transcribed using voice-recognition software. This may cause typographical errors occasionally.  Although all effort is made to fix these errors, please do not hesitate to contact our office if there Carley Pickering concern with the understanding of this note.

## 2023-02-21 NOTE — PROGRESS NOTES
Visit Information    Have you changed or started any medications since your last visit including any over-the-counter medicines, vitamins, or herbal medicines? no   Have you stopped taking any of your medications? Is so, why? -  no  Are you having any side effects from any of your medications? - no    Have you seen any other physician or provider since your last visit?  no   Have you had any other diagnostic tests since your last visit?  no   Have you been seen in the emergency room and/or had an admission in a hospital since we last saw you?  no   Have you had your routine dental cleaning in the past 6 months?  no     Do you have an active MyChart account? If no, what is the barrier?   No    Patient Care Team:  Judson Hernández MD as PCP - General (Family Medicine)  Sonia Bass MD as PCP - Empaneled Provider    Medical History Review  Past Medical, Family, and Social History reviewed and does contribute to the patient presenting condition    Health Maintenance   Topic Date Due    Diabetic foot exam  Never done    Hepatitis B vaccine (1 of 3 - Risk 3-dose series) Never done    Low dose CT lung screening  Never done    COVID-19 Vaccine (3 - Booster for MPV series) 03/09/2022    Diabetic Alb to Cr ratio (uACR) test  04/03/2022    Flu vaccine (1) 08/01/2022    Diabetic retinal exam  02/25/2023    Depression Screen  04/26/2023    A1C test (Diabetic or Prediabetic)  04/28/2023    Lipids  04/28/2023    GFR test (Diabetes, CKD 3-4, OR last GFR 15-59)  04/28/2023    Colorectal Cancer Screen  05/10/2024    DTaP/Tdap/Td vaccine (3 - Td or Tdap) 06/17/2032    Shingles vaccine  Completed    Pneumococcal 0-64 years Vaccine  Completed    Hepatitis C screen  Completed    HIV screen  Completed    Hepatitis A vaccine  Aged Out    Hib vaccine  Aged Out    Meningococcal (ACWY) vaccine  Aged Out

## 2023-03-02 DIAGNOSIS — E08.3293 DIABETES MELLITUS DUE TO UNDERLYING CONDITION WITH MILD NONPROLIFERATIVE RETINOPATHY OF BOTH EYES, WITHOUT LONG-TERM CURRENT USE OF INSULIN, MACULAR EDEMA PRESENCE UNSPECIFIED (HCC): ICD-10-CM

## 2023-03-02 RX ORDER — METFORMIN HYDROCHLORIDE 500 MG/1
TABLET, EXTENDED RELEASE ORAL
Qty: 120 TABLET | Refills: 5 | Status: SHIPPED | OUTPATIENT
Start: 2023-03-02

## 2023-03-02 NOTE — TELEPHONE ENCOUNTER
E-scribe request for med refill. Please review and e-scribe if applicable. Last Visit Date:  02/21/2023  Next Visit Date:  3/21/2023    Hemoglobin A1C (%)   Date Value   02/21/2023 9.0   04/28/2022 6.9 (H)   04/26/2022 7.1             ( goal A1C is < 7)   Microalb/Crt.  Ratio (mcg/mg creat)   Date Value   04/03/2021 154 (H)     LDL Cholesterol (mg/dL)   Date Value   04/28/2022 119       (goal LDL is <100)   AST (U/L)   Date Value   03/12/2016 19     ALT (U/L)   Date Value   03/12/2016 21     BUN (mg/dL)   Date Value   04/28/2022 17     BP Readings from Last 3 Encounters:   02/21/23 139/87   04/26/22 126/75   03/21/22 (!) 143/83          (goal 120/80)        Patient Active Problem List:     Elevated blood pressure reading     Smoker     Morbid obesity ([facility]     Need for vaccination     Right arm numbness     Hypovitaminosis D     Tobacco abuse     Essential hypertension     Smoking     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

## 2023-03-03 DIAGNOSIS — I10 ESSENTIAL HYPERTENSION: ICD-10-CM

## 2023-03-03 RX ORDER — LISINOPRIL 20 MG/1
TABLET ORAL
Qty: 30 TABLET | Refills: 3 | Status: SHIPPED | OUTPATIENT
Start: 2023-03-03

## 2023-03-03 NOTE — TELEPHONE ENCOUNTER
Please address the medication refill and close the encounter. If I can be of assistance, please route to the applicable pool. Thank you. Last visit: 2-21-23  Last Med refill: 8-26-22  Does patient have enough medication for 72 hours:     Next Visit Date:  Future Appointments   Date Time Provider Amish Giraldo   3/21/2023  3:45 PM Neyda Hopson MD [address] [place] [address] Maintenance   Topic Date Due    Diabetic foot exam  Never done    Hepatitis B vaccine (1 of 3 - Risk 3-dose series) Never done    Low dose CT lung screening  Never done    COVID-19 Vaccine (3 - Booster for Pfizer series) 03/09/2022    Diabetic Alb to Cr ratio (uACR) test  04/03/2022    Flu vaccine (1) 08/01/2022    Diabetic retinal exam  02/25/2023    Lipids  04/28/2023    GFR test (Diabetes, CKD 3-4, OR last GFR 15-59)  04/28/2023    A1C test (Diabetic or Prediabetic)  05/21/2023    Depression Screen  02/21/2024    Colorectal Cancer Screen  05/10/2024    DTaP/Tdap/Td vaccine (3 - Td or Tdap) 06/17/2032    Shingles vaccine  Completed    Pneumococcal 0-64 years Vaccine  Completed    Hepatitis C screen  Completed    HIV screen  Completed    Hepatitis A vaccine  Aged Out    Hib vaccine  Aged Out    Meningococcal (ACWY) vaccine  Aged Out       Hemoglobin A1C (%)   Date Value   02/21/2023 9.0   04/28/2022 6.9 (H)   04/26/2022 7.1             ( goal A1C is < 7)   Microalb/Crt.  Ratio (mcg/mg creat)   Date Value   04/03/2021 154 (H)     LDL Cholesterol (mg/dL)   Date Value   04/28/2022 119   04/03/2021 116       (goal LDL is <100)   AST (U/L)   Date Value   03/12/2016 19     ALT (U/L)   Date Value   03/12/2016 21     BUN (mg/dL)   Date Value   04/28/2022 17     BP Readings from Last 3 Encounters:   02/21/23 139/87   04/26/22 126/75   03/21/22 (!) 143/83          (goal 120/80)    All Future Testing planned in [facility]  Lab Frequency Next Occurrence               Patient Active Problem List:     Elevated blood pressure reading     Smoker Morbid obesity ([address]     Need for vaccination     Right arm numbness     Hypovitaminosis D     Tobacco abuse     Essential hypertension     Smoking     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

## 2023-03-21 ENCOUNTER — OFFICE VISIT (OUTPATIENT)
Dept: FAMILY MEDICINE CLINIC | Age: 53
End: 2023-03-21

## 2023-03-21 VITALS
BODY MASS INDEX: 44.41 KG/M2 | HEART RATE: 102 BPM | SYSTOLIC BLOOD PRESSURE: 128 MMHG | HEIGHT: 70 IN | DIASTOLIC BLOOD PRESSURE: 83 MMHG | WEIGHT: 310.2 LBS

## 2023-03-21 DIAGNOSIS — E08.3293 DIABETES MELLITUS DUE TO UNDERLYING CONDITION WITH MILD NONPROLIFERATIVE RETINOPATHY OF BOTH EYES, WITHOUT LONG-TERM CURRENT USE OF INSULIN, MACULAR EDEMA PRESENCE UNSPECIFIED (HCC): ICD-10-CM

## 2023-03-21 DIAGNOSIS — M54.42 ACUTE LEFT-SIDED LOW BACK PAIN WITH LEFT-SIDED SCIATICA: Primary | ICD-10-CM

## 2023-03-21 RX ORDER — NAPROXEN 250 MG/1
250 TABLET ORAL 2 TIMES DAILY WITH MEALS
Qty: 30 TABLET | Refills: 0 | Status: SHIPPED | OUTPATIENT
Start: 2023-03-21

## 2023-03-21 RX ORDER — CYCLOBENZAPRINE HCL 10 MG
10 TABLET ORAL NIGHTLY PRN
Qty: 10 TABLET | Refills: 0 | Status: SHIPPED | OUTPATIENT
Start: 2023-03-21 | End: 2023-03-28

## 2023-03-21 ASSESSMENT — ENCOUNTER SYMPTOMS
DIARRHEA: 0
SHORTNESS OF BREATH: 0
ABDOMINAL PAIN: 0
VOMITING: 0
BACK PAIN: 1
NAUSEA: 0

## 2023-03-21 NOTE — PROGRESS NOTES
Diabetic visit information    BP Readings from Last 3 Encounters:   23 139/87   22 126/75   22 (!) 143/83       Hemoglobin A1C (%)   Date Value   2023 9.0   2022 6.9 (H)   2022 7.1     Microalb/Crt. Ratio (mcg/mg creat)   Date Value   2021 154 (H)     LDL Cholesterol (mg/dL)   Date Value   2022 119               Have you changed or started any medications since your last visit including any over-the-counter medicines, vitamins, or herbal medicines? no   Have you stopped taking any of your medications? Is so, why? -  no  Are you having any side effects from any of your medications? - no    Have you seen any other physician or provider since your last visit?  no   Have you had any other diagnostic tests since your last visit?  no   Have you been seen in the emergency room and/or had an admission in a hospital since we last saw you?  no     Have you had your annual diabetic retinal (eye) exam? Yes : 2023 @ [facility]  (ensure copy of exam is in the chart)    Have you had your routine dental cleaning in the past 6 months? no    Do you have an active [employer] account? If not, what are your barriers? No: Inactive    Patient Care Team:  Claudette Gallego MD as PCP - General (Family Medicine)  Mariann Anders MD as PCP - Empaneled Provider    Medical history Review  Past Medical, Family, and Social History reviewed and does contribute to the patient presenting condition.     Health Maintenance   Topic Date Due    Diabetic foot exam  Never done    Hepatitis B vaccine (1 of 3 - Risk 3-dose series) Never done    Low dose CT lung screening  Never done    COVID-19 Vaccine (3 - Booster for Pfizer series) 2022    Diabetic Alb to Cr ratio (uACR) test  2022    Flu vaccine (1) 2022    Lipids  2023    GFR test (Diabetes, CKD 3-4, OR last GFR 15-59)  2023    A1C test (Diabetic or Prediabetic)  2023    Depression Screen  2024    Diabetic

## 2023-03-21 NOTE — PATIENT INSTRUCTIONS
Thank you for letting us take care of you today. We hope all your questions were addressed. If a question was overlooked or something else comes to mind after you return home, please contact a member of your Care Team listed below. Your Care Team at [place] [address] is Team #3  Yina Nunes MD (Faculty)  Mono Cross MD (Faculty  Arielsuzi Leal MD (Resident)  Dorie Barrientos (Resident)   Kenji Allen MD (Resident)  Rell Weston., ZO Jackson., ZO Avendaño., DIO Mejia., Zenaida Cano., Woody Machado ([facility])  Radha [facility], [address] [facility] (Clinical Practice Manager)  Cris Johnson [facility] (Clinical Pharmacist)     Office phone number: [phone]    If you need to get in right away due to illness, please be advised we have \"Same Day\" appointments available Monday-Friday. Please call us at [phone] option #3 to schedule your \"Same Day\" appointment.

## 2023-03-21 NOTE — PROGRESS NOTES
[facility]    Family Medicine Residency Program - Outpatient Note      Subjective:    Olegario Deras is a 47 y.o. male with  has a past medical history of Acute left-sided low back pain with left-sided sciatica, Essential hypertension, Lumbar back pain, Morbid obesity ([address] and Tobacco abuse. Presented to the office today for:  Chief Complaint   Patient presents with    Diabetes     Follow up    Lower Back Pain     Left side lower back pain and leg    Referral - General     Needs external referral to eye doctor       HPI    Established patient 50-year-old male is here to follow-up on diabetes    Diabetes mellitus  On metformin 1 g extended release twice daily  Victoza discontinued 2/21/23 and patient was started on Jardiance 10 mg once daily. Today patient reports elevated blood glucose readings at home 180s -early 200s and polyuria. Patient has chronic left sciatica, reports he was lifting a box 1 week ago when he felt he pulled muscle and since then his sciatica came back. Requesting referral for ophthalmology, optometry as he got new optical insurance from work    Review of Systems   Constitutional:  Negative for fever. HENT: Negative. Respiratory:  Negative for shortness of breath. Cardiovascular:  Negative for chest pain and leg swelling. Gastrointestinal:  Negative for abdominal pain, diarrhea, nausea and vomiting. Endocrine: Positive for polyuria. Musculoskeletal:  Positive for back pain. Neurological: Negative.                 The patient has a   Family History   Problem Relation Age of Onset    Heart Disease Mother     High Blood Pressure Mother     Diabetes Mother     Cancer Father        Objective:    /83 (Site: Right Upper Arm, Position: Sitting, Cuff Size: Large Adult)   Pulse (!) 102   Ht 5' 10\" (1.778 m)   Wt (!) 310 lb 3.2 oz (140.7 kg)   BMI 44.51 kg/m²    BP Readings from Last 3 Encounters:   03/21/23 128/83   02/21/23 139/87   04/26/22

## 2023-03-21 NOTE — PROGRESS NOTES
Attending Physician Statement  I  have discussed the care of Yazan Hyman including pertinent history and exam findings with the resident. I agree with the assessment, plan and orders as documented by the resident. DM  Last hba1c was 9  Vicjack was Dc'd prevously  Currently on jardiance, and dose was increased to 25 mg daily  On max dose of metformin     Left sided sciatic back pain   Will do Naproxen and Muscle relaxants       /83 (Site: Right Upper Arm, Position: Sitting, Cuff Size: Large Adult)   Pulse (!) 102   Ht 5' 10\" (1.778 m)   Wt (!) 310 lb 3.2 oz (140.7 kg)   BMI 44.51 kg/m²    BP Readings from Last 3 Encounters:   03/21/23 128/83   02/21/23 139/87   04/26/22 126/75     Wt Readings from Last 3 Encounters:   03/21/23 (!) 310 lb 3.2 oz (140.7 kg)   02/21/23 294 lb (133.4 kg)   04/26/22 (!) 312 lb 3.2 oz (141.6 kg)          Diagnosis Orders   1. Acute left-sided low back pain with left-sided sciatica  cyclobenzaprine (FLEXERIL) 10 MG tablet    naproxen (NAPROSYN) 250 MG tablet      2.  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

## 2023-03-22 DIAGNOSIS — I10 ESSENTIAL HYPERTENSION: ICD-10-CM

## 2023-03-22 RX ORDER — ASPIRIN 81 MG/1
TABLET ORAL
Qty: 90 TABLET | Refills: 5 | Status: SHIPPED | OUTPATIENT
Start: 2023-03-22

## 2023-03-22 NOTE — TELEPHONE ENCOUNTER
E-scribe request for ASPIRIN EC 81 MG TABLET. Please review and e-scribe if applicable. Last Visit Date:  3/21/2023  Next Visit Date:  5/5/2023    Hemoglobin A1C (%)   Date Value   02/21/2023 9.0   04/28/2022 6.9 (H)   04/26/2022 7.1             ( goal A1C is < 7)   Microalb/Crt.  Ratio (mcg/mg creat)   Date Value   04/03/2021 154 (H)     LDL Cholesterol (mg/dL)   Date Value   04/28/2022 119       (goal LDL is <100)   AST (U/L)   Date Value   03/12/2016 19     ALT (U/L)   Date Value   03/12/2016 21     BUN (mg/dL)   Date Value   04/28/2022 17     BP Readings from Last 3 Encounters:   03/21/23 128/83   02/21/23 139/87   04/26/22 126/75          (goal 120/80)        Patient Active Problem List:     Elevated blood pressure reading     Smoker     Morbid obesity ([address]     Need for vaccination     Right arm numbness     Hypovitaminosis D     Tobacco abuse     Essential hypertension     Smoking     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

## 2023-04-03 ENCOUNTER — TELEPHONE (OUTPATIENT)
Dept: FAMILY MEDICINE CLINIC | Age: 53
End: 2023-04-03

## 2023-04-03 DIAGNOSIS — M54.42 ACUTE LEFT-SIDED LOW BACK PAIN WITH LEFT-SIDED SCIATICA: ICD-10-CM

## 2023-04-04 RX ORDER — NAPROXEN 250 MG/1
250 TABLET ORAL 2 TIMES DAILY WITH MEALS
Qty: 30 TABLET | Refills: 0 | Status: SHIPPED | OUTPATIENT
Start: 2023-04-04

## 2023-04-04 NOTE — TELEPHONE ENCOUNTER
Called patient back for his left lower back and leg pain. Patient reports his pain is almost constant, getting worse and he has felt numbness and tingling in his left ankle which has never happened before. Pain is worse with movement and walking and standing for long time. Gets relieved temporarily by rest. Denies urinary or bowel incontinence. Patient reports its hard for him to work with this pain. Naproxen, flexeril and home exercise provided for sciatica are providing temporary relief. Will reorder naproxen as patient took the last tablet today. Lumbar and hip xray from 2022 show Mild to moderate multilevel degenerative changes within the lumbar spine and Mild left hip osteoarthrosis. Will order MRI lumbar spine and asked patient to f/u with results to discuss further management. Explained that patient should go to ED if symptoms worsens. Patient voices understanding and agrees to the plan.

## 2023-04-17 ENCOUNTER — TELEPHONE (OUTPATIENT)
Dept: FAMILY MEDICINE CLINIC | Age: 53
End: 2023-04-17

## 2023-04-20 DIAGNOSIS — M54.50 LUMBAR BACK PAIN: Primary | ICD-10-CM

## 2023-04-20 RX ORDER — HYDROCODONE BITARTRATE AND ACETAMINOPHEN 5; 325 MG/1; MG/1
1 TABLET ORAL NIGHTLY PRN
Qty: 20 TABLET | Refills: 0 | Status: SHIPPED | OUTPATIENT
Start: 2023-04-20 | End: 2023-05-10

## 2023-04-20 NOTE — PROGRESS NOTES
Patient was reached out on phone call for Short course Percocet perscribed for acute on chronic back pain  PDMP was reviewed   Imaging reviewed which showed     Multilevel degenerative change most pronounced at L3-4 and L4-5 with a   superimposed left paracentral disc extrusion at L4-5. Canal stenosis at L3-4 and L4-5. Foraminal narrowing and lateral recess narrowing as described above.      Will also add Ortho referral

## 2023-05-04 ENCOUNTER — OFFICE VISIT (OUTPATIENT)
Dept: ORTHOPEDIC SURGERY | Age: 53
End: 2023-05-04
Payer: COMMERCIAL

## 2023-05-04 VITALS — RESPIRATION RATE: 14 BRPM | WEIGHT: 300 LBS | BODY MASS INDEX: 42.95 KG/M2 | HEIGHT: 70 IN

## 2023-05-04 DIAGNOSIS — M51.26 LUMBAR DISC HERNIATION: ICD-10-CM

## 2023-05-04 DIAGNOSIS — M48.061 SPINAL STENOSIS OF LUMBAR REGION, UNSPECIFIED WHETHER NEUROGENIC CLAUDICATION PRESENT: ICD-10-CM

## 2023-05-04 DIAGNOSIS — M54.16 LUMBAR RADICULAR PAIN: Primary | ICD-10-CM

## 2023-05-04 DIAGNOSIS — M54.50 LUMBAR BACK PAIN: ICD-10-CM

## 2023-05-04 PROCEDURE — 99213 OFFICE O/P EST LOW 20 MIN: CPT | Performed by: ORTHOPAEDIC SURGERY

## 2023-05-04 NOTE — PROGRESS NOTES
for Pain for up to 20 days. Intended supply: 7 days.  Take lowest dose possible to manage pain Max Daily Amount: 1 tablet, Disp: 20 tablet, Rfl: 0    naproxen (NAPROSYN) 250 MG tablet, Take 1 tablet by mouth 2 times daily (with meals), Disp: 30 tablet, Rfl: 0    aspirin (ASPIRIN LOW DOSE) 81 MG EC tablet, take 1 tablet by mouth once daily, Disp: 90 tablet, Rfl: 5    empagliflozin (JARDIANCE) 25 MG tablet, Take 1 tablet by mouth daily, Disp: 30 tablet, Rfl: 3    lisinopril (PRINIVIL;ZESTRIL) 20 MG tablet, take 1 tablet by mouth once daily, Disp: 30 tablet, Rfl: 3    metFORMIN (GLUCOPHAGE-XR) 500 MG extended release tablet, take 2 tablets by mouth twice a day, Disp: 120 tablet, Rfl: 5    rosuvastatin (CRESTOR) 20 MG tablet, take 1 tablet by mouth once daily, Disp: 30 tablet, Rfl: 5    Insulin Pen Needle (ULTICARE MINI PEN NEEDLES) 31G X 6 MM MISC, use 1 daily with VICTOZA INJECTION, Disp: 100 each, Rfl: 3    nicotine (NICODERM CQ) 21 MG/24HR, Place 1 patch onto the skin every 24 hours, Disp: 30 patch, Rfl: 3  No Known Allergies  Social History     Socioeconomic History    Marital status:      Spouse name: Not on file    Number of children: Not on file    Years of education: Not on file    Highest education level: Not on file   Occupational History    Not on file   Tobacco Use    Smoking status: Every Day     Packs/day: 1.00     Years: 35.00     Pack years: 35.00     Types: Cigarettes    Smokeless tobacco: Never   Substance and Sexual Activity    Alcohol use: No     Alcohol/week: 0.0 standard drinks    Drug use: No    Sexual activity: Not on file   Other Topics Concern    Not on file   Social History Narrative    Not on file     Social Determinants of Health     Financial Resource Strain: Low Risk     Difficulty of Paying Living Expenses: Not hard at all   Food Insecurity: No Food Insecurity    Worried About Running Out of Food in the Last Year: Never true    Ran Out of Food in the Last Year: Never true

## 2023-05-11 ENCOUNTER — HOSPITAL ENCOUNTER (OUTPATIENT)
Dept: PAIN MANAGEMENT | Age: 53
Discharge: HOME | End: 2023-05-11
Payer: COMMERCIAL

## 2023-05-11 VITALS — BODY MASS INDEX: 42.95 KG/M2 | HEIGHT: 70 IN | TEMPERATURE: 97.3 F | WEIGHT: 300 LBS

## 2023-05-11 DIAGNOSIS — M51.26 LUMBAR DISC HERNIATION: Primary | ICD-10-CM

## 2023-05-11 DIAGNOSIS — E78.2 MIXED HYPERLIPIDEMIA: ICD-10-CM

## 2023-05-11 DIAGNOSIS — M54.16 LEFT LUMBAR RADICULOPATHY: ICD-10-CM

## 2023-05-11 PROCEDURE — 99203 OFFICE O/P NEW LOW 30 MIN: CPT

## 2023-05-11 ASSESSMENT — ENCOUNTER SYMPTOMS
ALLERGIC/IMMUNOLOGIC NEGATIVE: 1
EYES NEGATIVE: 1
GASTROINTESTINAL NEGATIVE: 1
RESPIRATORY NEGATIVE: 1
BACK PAIN: 1

## 2023-05-11 ASSESSMENT — PAIN SCALES - GENERAL: PAINLEVEL_OUTOF10: 4

## 2023-05-11 NOTE — PROGRESS NOTES
status:  Employment History:   Working  Yes  Full time Or Part time:  full time  Disability  No   Legal Issues related to pain complaint: No     Pain Disability Index score :       Lab Results   Component Value Date    LABA1C 9.0 02/21/2023     Lab Results   Component Value Date     04/28/2022         Informant: patient      Past Medical History:   Diagnosis Date    Acute left-sided low back pain with left-sided sciatica 3/21/2023    Essential hypertension 8/25/2016    Lumbar back pain 4/26/2022    Morbid obesity ([address] 2/26/2016    Tobacco abuse         History reviewed. No pertinent surgical history.     Social History     Socioeconomic History    Marital status:      Spouse name: None    Number of children: None    Years of education: None    Highest education level: None   Tobacco Use    Smoking status: Every Day     Packs/day: 1.00     Years: 35.00     Pack years: 35.00     Types: Cigarettes    Smokeless tobacco: Never   Substance and Sexual Activity    Alcohol use: No     Alcohol/week: 0.0 standard drinks    Drug use: No     Social Determinants of Health     Financial Resource Strain: Low Risk     Difficulty of Paying Living Expenses: Not hard at all   Food Insecurity: No Food Insecurity    Worried About Running Out of Food in the Last Year: Never true    Ran Out of Food in the Last Year: Never true   Transportation Needs: Unknown    Lack of Transportation (Non-Medical): No   Housing Stability: Unknown    Unstable Housing in the Last Year: No       Family History   Problem Relation Age of Onset    Heart Disease Mother     High Blood Pressure Mother     Diabetes Mother     Cancer Father        No Known Allergies    Vitals:    05/11/23 1533   Temp: 97.3 °F (36.3 °C)       Current Outpatient Medications   Medication Sig Dispense Refill    naproxen (NAPROSYN) 250 MG tablet Take 1 tablet by mouth 2 times daily (with meals) 30 tablet 0    aspirin (ASPIRIN LOW DOSE) 81 MG EC tablet take 1 tablet by

## 2023-05-12 RX ORDER — ROSUVASTATIN CALCIUM 20 MG/1
TABLET, COATED ORAL
Qty: 30 TABLET | Refills: 5 | Status: SHIPPED | OUTPATIENT
Start: 2023-05-12

## 2023-05-26 ENCOUNTER — HOSPITAL ENCOUNTER (OUTPATIENT)
Dept: PHYSICAL THERAPY | Age: 53
Setting detail: THERAPIES SERIES
Discharge: HOME | End: 2023-05-26
Payer: COMMERCIAL

## 2023-05-26 PROCEDURE — 97161 PT EVAL LOW COMPLEX 20 MIN: CPT

## 2023-05-26 NOTE — CONSULTS
into L lower extremity  [x] Other: Numbness and tingling in L knee to L ankle    STG: (to be met in 6 treatments)  ? Pain:0/10 Low back pain to improve ability to perform work tasks such as lifting, pushing, and pulling. Patient will report going 1 week without increase in low back pain or numbness and tingling in L calf. Patient will demonstrate knowledge in progression of home exercise program to be able to implement it in case of a flare up. Independent with Home Exercise Programs  Patient will report being able to sleep in his bed to demonstrate decreased improvement in low back irritability and improve sleep habits. Patient goals:Prevent flare up again    Rehab Potential:  [x] Good  [] Fair  [] Poor   Suggested Professional Referral:  [x] No  [] Yes:  Barriers to Goal Achievement[de-identified]  [x] No  [] Yes:  Domestic Concerns:  [x] No  [] Yes:    Pt. Education:  [x] Plans/Goals, Risks/Benefits discussed  [x] Home exercise program: See chart below  Method of Education: [x] Verbal  [x] Demo  [x] Written  Comprehension of Education:  [x] Verbalizes understanding. [x] Demonstrates understanding. [x] Needs Review. [] Demonstrates/verbalizes understanding of HEP/Ed previously given. Access Code: [email or web address]  URL: [email or web address]  Date: 05/26/2023  Prepared by: Freddie Linder    Exercises  - Supine Lower Trunk Rotation  - 1 x daily - 7 x weekly - 1-3 sets - 10 reps  - Supine Single Knee to Chest Stretch  - 1 x daily - 7 x weekly - 1-3 sets - 10 reps  - Supine Piriformis Stretch with Foot on Ground  - 1 x daily - 7 x weekly - 1-3 sets - 10 reps  - Supine Transversus Abdominis Bracing - Hands on Stomach  - 1 x daily - 7 x weekly - 1-3 sets - 10 reps  - Prone Press Up  - 1 x daily - 7 x weekly - 1-3 sets - 10 reps  - Standing Lumbar Extension  - 1 x daily - 7 x weekly - 1-3 sets - 10 reps    Treatment Plan:  [x] Therapeutic Exercise   43091  [] Vasopneumatic cold with compression  00215    [x]

## 2023-05-30 ENCOUNTER — CLINICAL DOCUMENTATION (OUTPATIENT)
Dept: PHYSICAL THERAPY | Age: 53
End: 2023-05-30

## 2023-05-30 NOTE — THERAPY DISCHARGE
[x] [facility] @ [facility]  [address] [place], [address]Margie Rivera [facility]  Phone [phone]  Fax [phone]    Physical Therapy Discharge Note    Date: 2023      Patient: Catie Joshua  : 1970  MRN: [id number]    Physician: Cory Vasquez MD                                   Medical Diagnosis:   M54.16 (ICD-10-CM) - Lumbar radicular pain   M48.061 (ICD-10-CM) - Spinal stenosis of lumbar region, unspecified whether neurogenic claudication present   M51.26 (ICD-10-CM) - Lumbar disc herniation                             Rehab Codes: M54.59 Low back pain  Onset Date: 2022                                 Next 's appt: 23  Total visits attended:1  Cancels/No shows:0/0  Date of initial visit: 23                   [] Patient recovered from conditions. Treatment goals were met. [] Patient received maximum benefit. No further therapy indicated at this time. [] Patient demonstrated improvement from condition with  ** Of  ** Short term goals met. []Patient demonstrated improvement from condition with **   Of **  Long term goals met. [] Patient to continue exercise/home instructions independently. [x] Therapy interrupted due to: Insurance Denial: Please see initial evaluation for last know status    [] Patient has 2 or more no shows/cancels, is discontinued per our policy. [] Patient has completed prescribed number of treatment sessions. [] Other:                     If you have any questions or concerns regarding this patient's care, please contact us.    Thank you for your referral.      Electronically signed by: Marissa Mckenzie PT

## 2023-05-31 ENCOUNTER — APPOINTMENT (OUTPATIENT)
Dept: PHYSICAL THERAPY | Age: 53
End: 2023-05-31
Payer: COMMERCIAL

## 2023-06-13 DIAGNOSIS — I10 ESSENTIAL HYPERTENSION: ICD-10-CM

## 2023-06-13 RX ORDER — LISINOPRIL 20 MG/1
TABLET ORAL
Qty: 60 TABLET | Refills: 5 | Status: SHIPPED | OUTPATIENT
Start: 2023-06-13

## 2023-06-13 NOTE — TELEPHONE ENCOUNTER
Last visit: 03/21/2023  Last Med refill: 03/03/2023  Does patient have enough medication for 72 hours: No:     Next Visit Date:  Future Appointments   Date Time Provider [address]   7/11/2023  4:20 PM Burgess Alfred MD [address] Maintenance   Topic Date Due    Diabetic foot exam  Never done    Hepatitis B vaccine (1 of 3 - Risk 3-dose series) Never done    Low dose CT lung screening &/or counseling  Never done    COVID-19 Vaccine (3 - Booster for Pfizer series) 03/09/2022    Diabetic Alb to Cr ratio (uACR) test  04/03/2022    Lipids  04/28/2023    GFR test (Diabetes, CKD 3-4, OR last GFR 15-59)  04/28/2023    A1C test (Diabetic or Prediabetic)  05/21/2023    Flu vaccine (Season Ended) 08/01/2023    Depression Screen  02/21/2024    Diabetic retinal exam  03/08/2024    Colorectal Cancer Screen  05/10/2024    DTaP/Tdap/Td vaccine (3 - Td or Tdap) 06/17/2032    Shingles vaccine  Completed    Pneumococcal 0-64 years Vaccine  Completed    Hepatitis C screen  Completed    HIV screen  Completed    Hepatitis A vaccine  Aged Out    Hib vaccine  Aged Out    Meningococcal (ACWY) vaccine  Aged Out    Diabetes screen  Discontinued       Hemoglobin A1C (%)   Date Value   02/21/2023 9.0   04/28/2022 6.9 (H)   04/26/2022 7.1             ( goal A1C is < 7)   Microalb/Crt.  Ratio (mcg/mg creat)   Date Value   04/03/2021 154 (H)     LDL Cholesterol (mg/dL)   Date Value   04/28/2022 119   04/03/2021 116       (goal LDL is <100)   AST (U/L)   Date Value   03/12/2016 19     ALT (U/L)   Date Value   03/12/2016 21     BUN (mg/dL)   Date Value   04/28/2022 17     BP Readings from Last 3 Encounters:   03/21/23 128/83   02/21/23 139/87   04/26/22 126/75          (goal 120/80)    All Future Testing planned in [facility]  Lab Frequency Next Occurrence               Patient Active Problem List:     Elevated blood pressure reading     Smoker     Morbid obesity ([address])     Need for vaccination     Right arm numbness

## 2023-07-16 DIAGNOSIS — E08.3293 DIABETES MELLITUS DUE TO UNDERLYING CONDITION WITH MILD NONPROLIFERATIVE RETINOPATHY OF BOTH EYES, WITHOUT LONG-TERM CURRENT USE OF INSULIN, MACULAR EDEMA PRESENCE UNSPECIFIED (HCC): ICD-10-CM

## 2023-07-17 NOTE — TELEPHONE ENCOUNTER
Last visit: [id number]  Last Med refill: [id number]  Does patient have enough medication for 72 hours: No:     Next Visit Date:  Future Appointments   Date Time Provider [address]   7/27/2023  3:40 PM Wilbur Segundo MD [address] [place] [address],[place] [address] Maintenance   Topic Date Due    Diabetic foot exam  Never done    Hepatitis B vaccine (1 of 3 - Risk 3-dose series) Never done    Low dose CT lung screening &/or counseling  Never done    COVID-19 Vaccine (3 - Booster for Pfizer series) 03/09/2022    Diabetic Alb to Cr ratio (uACR) test  04/03/2022    Lipids  04/28/2023    GFR test (Diabetes, CKD 3-4, OR last GFR 15-59)  04/28/2023    A1C test (Diabetic or Prediabetic)  05/21/2023    Flu vaccine (1) 08/01/2023    Depression Screen  02/21/2024    Diabetic retinal exam  03/08/2024    Colorectal Cancer Screen  05/10/2024    DTaP/Tdap/Td vaccine (3 - Td or Tdap) 06/17/2032    Shingles vaccine  Completed    Pneumococcal 0-64 years Vaccine  Completed    Hepatitis C screen  Completed    HIV screen  Completed    Hepatitis A vaccine  Aged Out    Hib vaccine  Aged Out    Meningococcal (ACWY) vaccine  Aged Out    Diabetes screen  Discontinued       Hemoglobin A1C (%)   Date Value   02/21/2023 9.0   04/28/2022 6.9 (H)   04/26/2022 7.1             ( goal A1C is < 7)   Microalb/Crt.  Ratio (mcg/mg creat)   Date Value   04/03/2021 154 (H)     LDL Cholesterol (mg/dL)   Date Value   04/28/2022 119   04/03/2021 116       (goal LDL is <100)   AST (U/L)   Date Value   03/12/2016 19     ALT (U/L)   Date Value   03/12/2016 21     BUN (mg/dL)   Date Value   04/28/2022 17     BP Readings from Last 3 Encounters:   03/21/23 128/83   02/21/23 139/87   04/26/22 126/75          (goal 120/80)    All Future Testing planned in [facility]  Lab Frequency Next Occurrence               Patient Active Problem List:     Elevated blood pressure reading     Smoker     Morbid obesity ([address])     Need for vaccination     Right arm numbness     Hypovitaminosis D

## 2023-07-18 RX ORDER — EMPAGLIFLOZIN 25 MG/1
TABLET, FILM COATED ORAL
Qty: 30 TABLET | Refills: 3 | Status: SHIPPED | OUTPATIENT
Start: 2023-07-18

## 2023-07-27 ENCOUNTER — OFFICE VISIT (OUTPATIENT)
Dept: FAMILY MEDICINE CLINIC | Age: 53
End: 2023-07-27
Payer: COMMERCIAL

## 2023-07-27 VITALS
SYSTOLIC BLOOD PRESSURE: 124 MMHG | HEART RATE: 97 BPM | WEIGHT: 303.4 LBS | DIASTOLIC BLOOD PRESSURE: 81 MMHG | BODY MASS INDEX: 43.44 KG/M2 | HEIGHT: 70 IN

## 2023-07-27 DIAGNOSIS — I10 ESSENTIAL HYPERTENSION: ICD-10-CM

## 2023-07-27 DIAGNOSIS — E08.3293 DIABETES MELLITUS DUE TO UNDERLYING CONDITION WITH MILD NONPROLIFERATIVE RETINOPATHY OF BOTH EYES, WITHOUT LONG-TERM CURRENT USE OF INSULIN, MACULAR EDEMA PRESENCE UNSPECIFIED (HCC): Primary | ICD-10-CM

## 2023-07-27 DIAGNOSIS — Z87.891 PERSONAL HISTORY OF TOBACCO USE: ICD-10-CM

## 2023-07-27 LAB — HBA1C MFR BLD: 8.5 %

## 2023-07-27 PROCEDURE — 83037 HB GLYCOSYLATED A1C HOME DEV: CPT

## 2023-07-27 PROCEDURE — G0296 VISIT TO DETERM LDCT ELIG: HCPCS

## 2023-07-27 PROCEDURE — 3074F SYST BP LT 130 MM HG: CPT

## 2023-07-27 PROCEDURE — 99213 OFFICE O/P EST LOW 20 MIN: CPT

## 2023-07-27 PROCEDURE — 3078F DIAST BP <80 MM HG: CPT

## 2023-07-27 RX ORDER — DULAGLUTIDE 0.75 MG/.5ML
0.75 INJECTION, SOLUTION SUBCUTANEOUS WEEKLY
Qty: 4 ADJUSTABLE DOSE PRE-FILLED PEN SYRINGE | Refills: 3 | Status: SHIPPED | OUTPATIENT
Start: 2023-07-27

## 2023-07-27 ASSESSMENT — ENCOUNTER SYMPTOMS
ABDOMINAL PAIN: 0
VOMITING: 0
COUGH: 0
DIARRHEA: 0
SHORTNESS OF BREATH: 0
NAUSEA: 0
BLOOD IN STOOL: 0
WHEEZING: 0

## 2023-07-27 NOTE — PROGRESS NOTES
Diabetic visit information    BP Readings from Last 3 Encounters:   07/27/23 124/81   03/21/23 128/83   02/21/23 139/87       Hemoglobin A1C (%)   Date Value   02/21/2023 9.0   04/28/2022 6.9 (H)   04/26/2022 7.1     LDL Cholesterol (mg/dL)   Date Value   04/28/2022 119               Have you changed or started any medications since your last visit including any over-the-counter medicines, vitamins, or herbal medicines? no   Have you stopped taking any of your medications? Is so, why? -  no  Are you having any side effects from any of your medications? - yes - Jardiance    Have you seen any other physician or provider since your last visit? yes - PT, Pain Management, Ortho   Have you had any other diagnostic tests since your last visit? yes - MRI   Have you been seen in the emergency room and/or had an admission in a hospital since we last saw you?  no     Have you had your annual diabetic retinal (eye) exam? Yes   (ensure copy of exam is in the chart)    Have you had your routine dental cleaning in the past 6 months? no    Do you have an active [employer] account? If not, what are your barriers? No: Pending    Patient Care Team:  Edin Deleon MD as PCP - General (Family Medicine)  Shikha Deng MD as PCP - Empaneled Provider    Medical history Review  Past Medical, Family, and Social History reviewed and does contribute to the patient presenting condition.     Health Maintenance   Topic Date Due    Diabetic foot exam  Never done    Hepatitis B vaccine (1 of 3 - Risk 3-dose series) Never done    Low dose CT lung screening &/or counseling  Never done    COVID-19 Vaccine (3 - Booster for Pfizer series) 03/09/2022    Diabetic Alb to Cr ratio (uACR) test  04/03/2022    Lipids  04/28/2023    GFR test (Diabetes, CKD 3-4, OR last GFR 15-59)  04/28/2023    A1C test (Diabetic or Prediabetic)  05/21/2023    Flu vaccine (1) 08/01/2023    Depression Screen  02/21/2024    Diabetic retinal exam  03/08/2024    Colorectal Cancer

## 2023-07-27 NOTE — PROGRESS NOTES
Michael Bustamante (:  1970) is a 47 y.o. male,Established patient, here for evaluation of the following chief complaint(s):  Medication Refill Kati Canseco ), Discuss Medications (Natalia Oka ), and Diabetes (Follow up with A1C - has been having elevated blood sugars = 169 - 219 this week)         ASSESSMENT/PLAN:  1. 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 Will add Trulicity and follow-up in 1 month.  -If A1c continues to Improve, consider back up on metformin or discontinue the Jardiance. -     Lipid Panel; Future  -     Microalbumin, Ur; Future  -     Basic Metabolic Panel; Future  -     Dulaglutide (TRULICITY) 7.08 AD/8.5CS SOPN; Inject 0.75 mg into the skin once a week, Disp-4 Adjustable Dose Pre-filled Pen Syringe, R-3Normal  2. Essential hypertension  -Controlled  -Continue lisinopril  3. Personal history of tobacco use  -     GA VISIT TO DISCUSS LUNG CA SCREEN W LDCT  -     CT Lung Screen (Annual/Baseline); Future      Return in about 5 weeks (around 2023). Subjective   SUBJECTIVE/OBJECTIVE:  47years old male patient with past ministry of hypertension, diabetes came to the office for diabetes follow-up. Patient is currently on metformin and Jardiance. Patient was in [facility] and metformin. Patient did not want to inject himself daily, Victoza was switched to oral Jardiance. Patient said that since he started the Natalia Oka, he has been having higher sugar readings which ranges around 180. He also reported more polydipsia and polyuria. A1c today is 8.5 down from 9. Further complaints      Review of Systems   Constitutional:  Negative for diaphoresis, fatigue and fever. Respiratory:  Negative for cough, shortness of breath and wheezing.

## 2023-08-31 ENCOUNTER — TELEPHONE (OUTPATIENT)
Dept: ONCOLOGY | Age: 53
End: 2023-08-31

## 2023-09-07 ENCOUNTER — HOSPITAL ENCOUNTER (OUTPATIENT)
Age: 53
End: 2023-09-07
Payer: COMMERCIAL

## 2023-09-07 ENCOUNTER — HOSPITAL ENCOUNTER (OUTPATIENT)
Dept: CT IMAGING | Age: 53
Discharge: HOME | End: 2023-09-09
Payer: COMMERCIAL

## 2023-09-07 VITALS — BODY MASS INDEX: 43.48 KG/M2 | WEIGHT: 303 LBS

## 2023-09-07 DIAGNOSIS — Z87.891 PERSONAL HISTORY OF TOBACCO USE: ICD-10-CM

## 2023-09-07 PROCEDURE — 71271 CT THORAX LUNG CANCER SCR C-: CPT

## 2023-09-14 ENCOUNTER — OFFICE VISIT (OUTPATIENT)
Dept: FAMILY MEDICINE CLINIC | Age: 53
End: 2023-09-14

## 2023-09-14 VITALS
HEIGHT: 70 IN | DIASTOLIC BLOOD PRESSURE: 83 MMHG | WEIGHT: 308 LBS | HEART RATE: 90 BPM | BODY MASS INDEX: 44.09 KG/M2 | SYSTOLIC BLOOD PRESSURE: 135 MMHG

## 2023-09-14 DIAGNOSIS — M48.062 SPINAL STENOSIS OF LUMBAR REGION WITH NEUROGENIC CLAUDICATION: ICD-10-CM

## 2023-09-14 DIAGNOSIS — E08.3293 DIABETES MELLITUS DUE TO UNDERLYING CONDITION WITH MILD NONPROLIFERATIVE RETINOPATHY OF BOTH EYES, WITHOUT LONG-TERM CURRENT USE OF INSULIN, MACULAR EDEMA PRESENCE UNSPECIFIED (HCC): Primary | ICD-10-CM

## 2023-09-14 DIAGNOSIS — M54.16 LEFT LUMBAR RADICULOPATHY: ICD-10-CM

## 2023-09-14 PROBLEM — E11.3299 MILD NONPROLIFERATIVE DIABETIC RETINOPATHY ASSOCIATED WITH TYPE 2 DIABETES MELLITUS (HCC): Status: ACTIVE | Noted: 2022-02-25

## 2023-09-14 RX ORDER — LIRAGLUTIDE 6 MG/ML
INJECTION SUBCUTANEOUS
COMMUNITY

## 2023-09-14 ASSESSMENT — ENCOUNTER SYMPTOMS
BACK PAIN: 1
GASTROINTESTINAL NEGATIVE: 1
RESPIRATORY NEGATIVE: 1

## 2023-09-14 NOTE — PROGRESS NOTES
Visit Information    Have you changed or started any medications since your last visit including any over-the-counter medicines, vitamins, or herbal medicines? no   Have you stopped taking any of your medications? Is so, why? -  no  Are you having any side effects from any of your medications? - no    Have you seen any other physician or provider since your last visit?  no   Have you had any other diagnostic tests since your last visit? yes - Lung CT 23   Have you been seen in the emergency room and/or had an admission in a hospital since we last saw you?  no   Have you had your routine dental cleaning in the past 6 months?  no     Do you have an active MyChart account? If no, what is the barrier?   No: Code     Patient Care Team:  Vidya Solomon MD as PCP - General (Family Medicine)  He[facility] Duggan [facility]    Medical History Review  Past Medical, Family, and Social History reviewed and does contribute to the patient presenting condition    Health Maintenance   Topic Date Due    Hepatitis B vaccine (1 of 3 - 3-dose series) Never done    Diabetic foot exam  Never done    Pneumococcal 0-64 years Vaccine (2 - PCV) 2017    COVID-19 Vaccine (3 - Pfizer series) 2022    Diabetic Alb to Cr ratio (uACR) test  2022    Lipids  2023    GFR test (Diabetes, CKD 3-4, OR last GFR 15-59)  2023    Flu vaccine (1) 2023    Depression Screen  2024    Diabetic retinal exam  2024    Colorectal Cancer Screen  05/10/2024    A1C test (Diabetic or Prediabetic)  2024    Low dose CT lung screening &/or counseling  2024    DTaP/Tdap/Td vaccine (3 - Td or Tdap) 2032    Shingles vaccine  Completed    Hepatitis C screen  Completed    HIV screen  Completed    Hepatitis A vaccine  Aged Out    Hib vaccine  Aged Out    Meningococcal (ACWY) vaccine  Aged Out    Diabetes screen  Discontinued

## 2023-10-10 ENCOUNTER — HOSPITAL ENCOUNTER (OUTPATIENT)
Dept: PAIN MANAGEMENT | Age: 53
Discharge: HOME | End: 2023-10-10
Payer: COMMERCIAL

## 2023-10-10 VITALS — BODY MASS INDEX: 42.95 KG/M2 | HEIGHT: 70 IN | WEIGHT: 300 LBS

## 2023-10-10 DIAGNOSIS — M54.16 LEFT LUMBAR RADICULOPATHY: ICD-10-CM

## 2023-10-10 DIAGNOSIS — M51.26 LUMBAR DISC HERNIATION: ICD-10-CM

## 2023-10-10 DIAGNOSIS — M54.50 LUMBAR BACK PAIN: Primary | ICD-10-CM

## 2023-10-10 DIAGNOSIS — M48.062 SPINAL STENOSIS OF LUMBAR REGION WITH NEUROGENIC CLAUDICATION: ICD-10-CM

## 2023-10-10 PROCEDURE — 99213 OFFICE O/P EST LOW 20 MIN: CPT

## 2023-10-10 PROCEDURE — 99214 OFFICE O/P EST MOD 30 MIN: CPT | Performed by: NURSE PRACTITIONER

## 2023-10-10 ASSESSMENT — ENCOUNTER SYMPTOMS
BACK PAIN: 1
BOWEL INCONTINENCE: 0
CONSTIPATION: 0
COUGH: 0
SHORTNESS OF BREATH: 0

## 2023-10-10 ASSESSMENT — PAIN SCALES - GENERAL: PAINLEVEL_OUTOF10: 4

## 2023-10-10 NOTE — PROGRESS NOTES
Chief Complaint   Patient presents with    Back Pain        PMH     Pt c/o chronic lumbar pain    no known injury or surgery to the area with onset more than 1 year ago and has progressively worsened  Pain is located in the lower lumbar area across midline affect both sides with radiation of pain down right leg and numbness and tingling in right foot  MRI lumbar spine April 2023 Multilevel degenerative change most pronounced at L3-4 and L4-5 with a superimposed left paracentral disc extrusion at L4-5. Canal stenosis at L3-4 and L4-5. Has seen Dr Jose Odom 5/4/23 Candidate for L3-4 L4-5 left decompression if interventions don't help   Was advised on consult to start physical therapy but after one visit was told it was denied  Would like to proceed with LESI discussed at consult. Back Pain  This is a chronic problem. The current episode started more than 1 month ago. The problem occurs daily. The problem has been gradually worsening since onset. The pain is present in the lumbar spine. The pain radiates to the right thigh, right knee and right foot. The pain is at a severity of 4/10. The pain is mild. The pain is Worse during the night. The symptoms are aggravated by position, standing, twisting and bending. Stiffness is present In the morning. Associated symptoms include a fever, leg pain (occ leg weakness), numbness, tingling and weakness. Pertinent negatives include no bladder incontinence, bowel incontinence, chest pain or headaches. Risk factors include obesity and poor posture. He has tried heat, ice, home exercises, NSAIDs and analgesics for the symptoms. The treatment provided mild relief. Past Medical History:   Diagnosis Date    Acute left-sided low back pain with left-sided sciatica 3/21/2023    Essential hypertension 8/25/2016    Lumbar back pain 4/26/2022    Morbid obesity ([address]) 2/26/2016    Tobacco abuse        History reviewed. No pertinent surgical history.     No Known

## 2023-10-14 ENCOUNTER — HOSPITAL ENCOUNTER (OUTPATIENT)
Age: 53
Discharge: HOME | End: 2023-10-14
Payer: COMMERCIAL

## 2023-10-14 DIAGNOSIS — E08.3293 DIABETES MELLITUS DUE TO UNDERLYING CONDITION WITH MILD NONPROLIFERATIVE RETINOPATHY OF BOTH EYES, WITHOUT LONG-TERM CURRENT USE OF INSULIN, MACULAR EDEMA PRESENCE UNSPECIFIED (HCC): ICD-10-CM

## 2023-10-14 LAB
ANION GAP SERPL CALCULATED.3IONS-SCNC: 13 MMOL/L (ref 9–17)
BUN SERPL-MCNC: 18 MG/DL (ref 6–20)
CALCIUM SERPL-MCNC: 9.6 MG/DL (ref 8.6–10.4)
CHLORIDE SERPL-SCNC: 103 MMOL/L (ref 98–107)
CHOLEST SERPL-MCNC: 166 MG/DL
CHOLESTEROL/HDL RATIO: 3.7
CO2 SERPL-SCNC: 25 MMOL/L (ref 20–31)
CREAT SERPL-MCNC: 1 MG/DL (ref 0.7–1.2)
CREAT UR-MCNC: 92.6 MG/DL (ref 39–259)
GFR SERPL CREATININE-BSD FRML MDRD: >60 ML/MIN/1.73M2
GLUCOSE SERPL-MCNC: 143 MG/DL (ref 70–99)
HDLC SERPL-MCNC: 45 MG/DL
LDLC SERPL CALC-MCNC: 91 MG/DL (ref 0–130)
MICROALBUMIN UR-MCNC: 367 MG/L
MICROALBUMIN/CREAT UR-RTO: 396 MCG/MG CREAT
POTASSIUM SERPL-SCNC: 4.5 MMOL/L (ref 3.7–5.3)
SODIUM SERPL-SCNC: 141 MMOL/L (ref 135–144)
TRIGL SERPL-MCNC: 151 MG/DL

## 2023-10-14 PROCEDURE — 82043 UR ALBUMIN QUANTITATIVE: CPT

## 2023-10-14 PROCEDURE — 82570 ASSAY OF URINE CREATININE: CPT

## 2023-10-14 PROCEDURE — 80048 BASIC METABOLIC PNL TOTAL CA: CPT

## 2023-10-14 PROCEDURE — 83036 HEMOGLOBIN GLYCOSYLATED A1C: CPT

## 2023-10-14 PROCEDURE — 80061 LIPID PANEL: CPT

## 2023-10-14 PROCEDURE — 36415 COLL VENOUS BLD VENIPUNCTURE: CPT

## 2023-10-15 LAB
EST. AVERAGE GLUCOSE BLD GHB EST-MCNC: 174 MG/DL
HBA1C MFR BLD: 7.7 % (ref 4–6)

## 2023-11-16 DIAGNOSIS — E78.2 MIXED HYPERLIPIDEMIA: ICD-10-CM

## 2023-11-16 RX ORDER — ROSUVASTATIN CALCIUM 20 MG/1
TABLET, COATED ORAL
Qty: 30 TABLET | Refills: 0 | Status: SHIPPED | OUTPATIENT
Start: 2023-11-16

## 2023-11-16 NOTE — TELEPHONE ENCOUNTER
E-scribe request for CRESTOR. Please review and e-scribe if applicable.      Last Visit Date:  9/14/23  Next Visit Date:  11/30/2023    Hemoglobin A1C (%)   Date Value   10/14/2023 7.7 (H)   07/27/2023 8.5   02/21/2023 9.0             ( goal A1C is < 7)   No components found for: \"LABMICR\"  LDL Cholesterol (mg/dL)   Date Value   10/14/2023 91       (goal LDL is <100)   AST (U/L)   Date Value   03/12/2016 19     ALT (U/L)   Date Value   03/12/2016 21     BUN (mg/dL)   Date Value   10/14/2023 18     BP Readings from Last 3 Encounters:   09/14/23 135/83   07/27/23 124/81   03/21/23 128/83          (goal 120/80)        Patient Active Problem List:     Elevated blood pressure reading     Smoker     Morbid obesity ([address])     Need for vaccination     Right arm numbness     Hypovitaminosis D     Tobacco abuse     Essential hypertension     Smoking     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

## 2023-11-20 ENCOUNTER — HOSPITAL ENCOUNTER (OUTPATIENT)
Dept: PAIN MANAGEMENT | Facility: CLINIC | Age: 53
Discharge: HOME | End: 2023-11-20
Payer: COMMERCIAL

## 2023-11-20 VITALS
SYSTOLIC BLOOD PRESSURE: 107 MMHG | OXYGEN SATURATION: 92 % | HEART RATE: 85 BPM | HEIGHT: 71 IN | WEIGHT: 300 LBS | TEMPERATURE: 98 F | BODY MASS INDEX: 42 KG/M2 | RESPIRATION RATE: 11 BRPM | DIASTOLIC BLOOD PRESSURE: 77 MMHG

## 2023-11-20 DIAGNOSIS — M54.16 LEFT LUMBAR RADICULOPATHY: ICD-10-CM

## 2023-11-20 DIAGNOSIS — M51.26 LUMBAR DISC HERNIATION: Primary | ICD-10-CM

## 2023-11-20 DIAGNOSIS — R52 PAIN MANAGEMENT: ICD-10-CM

## 2023-11-20 LAB — GLUCOSE BLD-MCNC: 131 MG/DL (ref 75–110)

## 2023-11-20 PROCEDURE — 2500000003 HC RX 250 WO HCPCS: Performed by: ANESTHESIOLOGY

## 2023-11-20 PROCEDURE — 82947 ASSAY GLUCOSE BLOOD QUANT: CPT

## 2023-11-20 PROCEDURE — 62323 NJX INTERLAMINAR LMBR/SAC: CPT

## 2023-11-20 PROCEDURE — 62323 NJX INTERLAMINAR LMBR/SAC: CPT | Performed by: ANESTHESIOLOGY

## 2023-11-20 PROCEDURE — 6360000002 HC RX W HCPCS: Performed by: ANESTHESIOLOGY

## 2023-11-20 PROCEDURE — 2580000003 HC RX 258: Performed by: ANESTHESIOLOGY

## 2023-11-20 PROCEDURE — 6360000004 HC RX CONTRAST MEDICATION: Performed by: ANESTHESIOLOGY

## 2023-11-20 RX ORDER — MIDAZOLAM HYDROCHLORIDE 2 MG/2ML
INJECTION, SOLUTION INTRAMUSCULAR; INTRAVENOUS
Status: COMPLETED | OUTPATIENT
Start: 2023-11-20 | End: 2023-11-20

## 2023-11-20 RX ORDER — DEXAMETHASONE SODIUM PHOSPHATE 10 MG/ML
INJECTION, SOLUTION INTRAMUSCULAR; INTRAVENOUS
Status: COMPLETED | OUTPATIENT
Start: 2023-11-20 | End: 2023-11-20

## 2023-11-20 RX ORDER — SODIUM CHLORIDE 0.9 % (FLUSH) 0.9 %
SYRINGE (ML) INJECTION
Status: COMPLETED | OUTPATIENT
Start: 2023-11-20 | End: 2023-11-20

## 2023-11-20 RX ORDER — FENTANYL CITRATE 50 UG/ML
INJECTION, SOLUTION INTRAMUSCULAR; INTRAVENOUS
Status: COMPLETED | OUTPATIENT
Start: 2023-11-20 | End: 2023-11-20

## 2023-11-20 RX ORDER — LIDOCAINE HYDROCHLORIDE 10 MG/ML
INJECTION, SOLUTION EPIDURAL; INFILTRATION; INTRACAUDAL; PERINEURAL
Status: COMPLETED | OUTPATIENT
Start: 2023-11-20 | End: 2023-11-20

## 2023-11-20 RX ADMIN — Medication 5 ML: at 09:56

## 2023-11-20 RX ADMIN — LIDOCAINE HYDROCHLORIDE 5 ML: 10 INJECTION, SOLUTION EPIDURAL; INFILTRATION; INTRACAUDAL at 09:55

## 2023-11-20 RX ADMIN — DEXAMETHASONE SODIUM PHOSPHATE 10 MG: 10 INJECTION, SOLUTION INTRAMUSCULAR; INTRAVENOUS at 09:56

## 2023-11-20 RX ADMIN — IOHEXOL 3 ML: 180 INJECTION INTRAVENOUS at 09:55

## 2023-11-20 RX ADMIN — MIDAZOLAM HYDROCHLORIDE 1 MG: 1 INJECTION, SOLUTION INTRAMUSCULAR; INTRAVENOUS at 09:55

## 2023-11-20 RX ADMIN — FENTANYL CITRATE 50 MCG: 50 INJECTION, SOLUTION INTRAMUSCULAR; INTRAVENOUS at 09:55

## 2023-11-20 ASSESSMENT — PAIN - FUNCTIONAL ASSESSMENT
PAIN_FUNCTIONAL_ASSESSMENT: 0-10
PAIN_FUNCTIONAL_ASSESSMENT: PREVENTS OR INTERFERES WITH ALL ACTIVE AND SOME PASSIVE ACTIVITIES
PAIN_FUNCTIONAL_ASSESSMENT: 0-10

## 2023-11-20 ASSESSMENT — PAIN DESCRIPTION - DESCRIPTORS: DESCRIPTORS: NUMBNESS;ACHING;SHARP

## 2023-11-20 NOTE — DISCHARGE INSTRUCTIONS

## 2023-11-20 NOTE — H&P
Pain Pre-Op H&P Note    Natali Borrego MD    HPI: Vikas Morgan  presents with     Back pain  Chronic going on for several months located in the lower lumbar area across midline affect both sides  No particular injury associated with the onset of pain  Pain aggravated with excessive activity particularly standing and walking  Alleviating factors are rest and sitting  Reports radiation of pain down left leg  Associated symptoms include numbness and tingling in left foot  Had recent MRI lumbar spine April 2023    Past Medical History:   Diagnosis Date    Acute left-sided low back pain with left-sided sciatica 3/21/2023    Essential hypertension 8/25/2016    Lumbar back pain 4/26/2022    Morbid obesity ([address]) 2/26/2016    Tobacco abuse        History reviewed. No pertinent surgical history. Family History   Problem Relation Age of Onset    Heart Disease Mother     High Blood Pressure Mother     Diabetes Mother     Cancer Father        No Known Allergies      Current Outpatient Medications:     rosuvastatin (CRESTOR) 20 MG tablet, TAKE 1 TABLET BY MOUTH EVERY DAY, Disp: 30 tablet, Rfl: 0    Liraglutide (VICTOZA) 18 MG/3ML SOPN SC injection, Inject by subcutaneous route.  (Patient not taking: Reported on 11/20/2023), Disp: , Rfl:     Dulaglutide (TRULICITY) 9.97 [place]/4.8DV SOPN, Inject 0.75 mg into the skin once a week, Disp: 4 Adjustable Dose Pre-filled Pen Syringe, Rfl: 3    JARDIANCE 25 MG tablet, TAKE 1 TABLET BY MOUTH EVERY DAY, Disp: 30 tablet, Rfl: 3    lisinopril (PRINIVIL;ZESTRIL) 20 MG tablet, TAKE 1 TABLET BY MOUTH EVERY DAY, Disp: 60 tablet, Rfl: 5    naproxen (NAPROSYN) 250 MG tablet, Take 1 tablet by mouth 2 times daily (with meals), Disp: 30 tablet, Rfl: 0    aspirin (ASPIRIN LOW DOSE) 81 MG EC tablet, take 1 tablet by mouth once daily, Disp: 90 tablet, Rfl: 5    metFORMIN (GLUCOPHAGE-XR) 500 MG extended release tablet, take 2 tablets by mouth twice a day, Disp: 120 tablet, Rfl: 5    Insulin Pen Needle

## 2023-11-20 NOTE — OP NOTE
Patient Name: Bruce Hauser   YOB: 1970  Room/Bed: Room/bed info not found  Medical Record Number: [id number]  Date: 11/20/2023       Sedation/ Anesthesia Plan:   intravenous sedation   as needed. Medications Planned:   midazolam (Versed) / Fentanyl  Intravenously  as needed. Preoperative Diagnosis:    1. Lumbar disc herniation    2. Left lumbar radiculopathy        Postoperative Diagnosis:    1. Lumbar disc herniation    2. Left lumbar radiculopathy        Procedure Performed:  Lumbar epidural steroid injection under fluoroscopy guidance    Blood Loss: None    Procedure: The Patient was seen in the preop area, chart was reviewed, informed consent was obtained. Patient was taken to procedure room and was placed in prone position. Vital signs were monitored through out the  Procedure. A time out was completed. The skin over the back was prepped and draped in sterile manner. The target point was marked at The interlaminar space at L4/5 . Skin and deep tissues were anesthetized with 1 % lidocaine. A 20-gauge Tuohy epidural needlele was advanced  under fluoroscopy guidance in AP view. Epidural space was identified using SUNG technique. Position ws confirmed in Lateral view. Then after negative aspiration contrast dye Omnipaque-180 was injected with live fluoroscopy in AP views that showed  spread of the contrast in the epidural space  and no vascular runoff or intrathecal spread. Finally 5 ml of treatment solution containing 4 ml of PF NS and 1 ml of DEXAMETHASONE 10 mg / ml was injected. The needle was removed and a Band-Aid was placed over the needle  insertion site. The patient's vital signs remained stable and the patient tolerated the procedure well.       Electronically signed by Alice Rubio MD on 11/20/2023 at 10:04 AM

## 2023-11-23 DIAGNOSIS — E08.3293 DIABETES MELLITUS DUE TO UNDERLYING CONDITION WITH MILD NONPROLIFERATIVE RETINOPATHY OF BOTH EYES, WITHOUT LONG-TERM CURRENT USE OF INSULIN, MACULAR EDEMA PRESENCE UNSPECIFIED (HCC): ICD-10-CM

## 2023-11-24 RX ORDER — EMPAGLIFLOZIN 25 MG/1
TABLET, FILM COATED ORAL
Qty: 30 TABLET | Refills: 3 | Status: SHIPPED | OUTPATIENT
Start: 2023-11-24 | End: 2023-11-30 | Stop reason: SDUPTHER

## 2023-11-24 NOTE — TELEPHONE ENCOUNTER
Please address the medication refill and close the encounter.  If I can be of assistance, please route to the applicable pool.      Thank you.      Last visit: 9-14-23  Last Med refill: 7-18-23  Does patient have enough medication for 72 hours: No:     Next Visit Date:  Future Appointments   Date Time Provider Department Center   11/30/2023  3:40 PM Davie Haas MD [facility]   12/12/2023  4:00 PM Cesia Leiva, APRN - CNP [facility]   Topic Date Due    Hepatitis B vaccine (1 of 3 - 3-dose series) Never done    Diabetic foot exam  Never done    Pneumococcal 0-64 years Vaccine (2 - PCV) 08/25/2017    Flu vaccine (1) 08/01/2023    COVID-19 Vaccine (3 - 2023-24 season) 09/01/2023    Depression Screen  02/21/2024    Diabetic retinal exam  03/08/2024    Colorectal Cancer Screen  05/10/2024    Low dose CT lung screening &/or counseling  09/07/2024    A1C test (Diabetic or Prediabetic)  10/14/2024    Diabetic Alb to Cr ratio (uACR) test  10/14/2024    Lipids  10/14/2024    GFR test (Diabetes, CKD 3-4, OR last GFR 15-59)  10/14/2024    DTaP/Tdap/Td vaccine (3 - Td or Tdap) 06/17/2032    Shingles vaccine  Completed    Hepatitis C screen  Completed    HIV screen  Completed    Hepatitis A vaccine  Aged Out    Hib vaccine  Aged Out    Meningococcal (ACWY) vaccine  Aged Out    Diabetes screen  Discontinued       Hemoglobin A1C (%)   Date Value   10/14/2023 7.7 (H)   07/27/2023 8.5   02/21/2023 9.0             ( goal A1C is < 7)   No components found for: \"LABMICR\"  LDL Cholesterol (mg/dL)   Date Value   10/14/2023 91   04/28/2022 119       (goal LDL is <100)   AST (U/L)   Date Value   03/12/2016 19     ALT (U/L)   Date Value   03/12/2016 21     BUN (mg/dL)   Date Value   10/14/2023 18     BP Readings from Last 3 Encounters:   11/20/23 107/77   09/14/23 135/83 07/27/23 124/81          (goal 120/80)    All Future Testing planned in [facility]  Lab Frequency Next Occurrence   Lumbar

## 2023-11-27 DIAGNOSIS — E78.2 MIXED HYPERLIPIDEMIA: ICD-10-CM

## 2023-11-27 RX ORDER — ROSUVASTATIN CALCIUM 20 MG/1
TABLET, COATED ORAL
Qty: 30 TABLET | Refills: 0 | OUTPATIENT
Start: 2023-11-27

## 2023-11-30 ENCOUNTER — OFFICE VISIT (OUTPATIENT)
Dept: FAMILY MEDICINE CLINIC | Age: 53
End: 2023-11-30
Payer: COMMERCIAL

## 2023-11-30 VITALS
HEIGHT: 71 IN | BODY MASS INDEX: 41.13 KG/M2 | SYSTOLIC BLOOD PRESSURE: 106 MMHG | DIASTOLIC BLOOD PRESSURE: 65 MMHG | OXYGEN SATURATION: 98 % | TEMPERATURE: 97.2 F | HEART RATE: 102 BPM | WEIGHT: 293.8 LBS

## 2023-11-30 DIAGNOSIS — I10 ESSENTIAL HYPERTENSION: ICD-10-CM

## 2023-11-30 DIAGNOSIS — E08.3293 DIABETES MELLITUS DUE TO UNDERLYING CONDITION WITH MILD NONPROLIFERATIVE RETINOPATHY OF BOTH EYES, WITHOUT LONG-TERM CURRENT USE OF INSULIN, MACULAR EDEMA PRESENCE UNSPECIFIED (HCC): Primary | ICD-10-CM

## 2023-11-30 DIAGNOSIS — E78.2 MIXED HYPERLIPIDEMIA: ICD-10-CM

## 2023-11-30 LAB — HBA1C MFR BLD: 7.4 %

## 2023-11-30 PROCEDURE — 3078F DIAST BP <80 MM HG: CPT

## 2023-11-30 PROCEDURE — 83036 HEMOGLOBIN GLYCOSYLATED A1C: CPT

## 2023-11-30 PROCEDURE — 99213 OFFICE O/P EST LOW 20 MIN: CPT

## 2023-11-30 PROCEDURE — 3074F SYST BP LT 130 MM HG: CPT

## 2023-11-30 PROCEDURE — 99212 OFFICE O/P EST SF 10 MIN: CPT | Performed by: FAMILY MEDICINE

## 2023-11-30 RX ORDER — ROSUVASTATIN CALCIUM 20 MG/1
20 TABLET, COATED ORAL DAILY
Qty: 30 TABLET | Refills: 5 | Status: SHIPPED | OUTPATIENT
Start: 2023-11-30

## 2023-11-30 ASSESSMENT — PATIENT HEALTH QUESTIONNAIRE - PHQ9
SUM OF ALL RESPONSES TO PHQ QUESTIONS 1-9: 0
SUM OF ALL RESPONSES TO PHQ9 QUESTIONS 1 & 2: 0
1. LITTLE INTEREST OR PLEASURE IN DOING THINGS: 0
2. FEELING DOWN, DEPRESSED OR HOPELESS: 0
SUM OF ALL RESPONSES TO PHQ QUESTIONS 1-9: 0

## 2023-11-30 ASSESSMENT — ENCOUNTER SYMPTOMS
GASTROINTESTINAL NEGATIVE: 1
RESPIRATORY NEGATIVE: 1

## 2023-11-30 NOTE — PROGRESS NOTES
[facility]    Family Medicine Residency Program - Outpatient Note      Subjective:    Stephanie Garvey is a 46 y.o. male with  has a past medical history of Acute left-sided low back pain with left-sided sciatica, Essential hypertension, Lumbar back pain, Mixed hyperlipidemia, Morbid obesity ([address]), and Tobacco abuse. Presented to the office today for:  Chief Complaint   Patient presents with    Hypertension    Diabetes    Health Maintenance     Declined injections due to having steroid spinal epidural injection. HPI  Established Patient 45 yo M is here to follow up on his diabetes    DM II  -On metformin and jardiance, trulicity 0.51 mg started 7/27/23  -A1c today 7.4  -Will check CMP to monitor, denies any GI side effects and abdominal pain, last BMP shows kidney function is okay  -no change in meds today    -Tachycardia 598>535>722  -patient denies any symptoms, no CP or palpitations, dizziness  -BP is soft and tachycardia suggest he is losing fluids, -pt reports he is urinating very frequently. Reports this has been happening after he started jardiance.  -Patient was asked to get relaxed and drink water in office and his HR came down to 102. Patient educated about tachycardia, signs and symptoms to look out for, with return precautions provided     -will cut down jardiance to 10 mg daily, prescribe new 10 mg   -Patient just got laid off from his job, says he just got his refill for jardiance and plans to cut down Jardiance tablets in half, will update patient that he should not split it in half and take the 10 mg ones. HTN  -Controlled. -BP monitor at home  -Continue lisinopril 20 mg      11/20/23 - Lumbar epidural steroid injection under floroscopy guidance for Left lumbar disc herniation and radiculopathy, reports it worked really good and he is feeling much better.     Healthcare maintenance    -Smoking cessation counselling    -CT lung completed - negative 9/7/23    Review of
lung screening &/or counseling  09/07/2024    A1C test (Diabetic or Prediabetic)  10/14/2024    Diabetic Alb to Cr ratio (uACR) test  10/14/2024    Lipids  10/14/2024    GFR test (Diabetes, CKD 3-4, OR last GFR 15-59)  10/14/2024    DTaP/Tdap/Td vaccine (3 - Td or Tdap) 06/17/2032    Shingles vaccine  Completed    Hepatitis C screen  Completed    HIV screen  Completed    Hepatitis A vaccine  Aged Out    Hib vaccine  Aged Out    Meningococcal (ACWY) vaccine  Aged Out    Diabetes screen  Discontinued
plan.  Patient may benefit from further discussion of blood pressure target as patient blood pressure initially on the lower normal and with elevated heart rate initially possibly signaling hypovolemia. Furthermore, as patient is diabetic, patient may benefit from less intensive blood pressure regiment with systolic blood pressure goals between 120-140. Lastly, patient to stop any and all NSAIDs at this time pending further review of patient volume status by resident team and further workup. Close follow-up recommended. Return in about 2 weeks (around 12/14/2023) for DM follow up.    ([address] ) Dr. Chelsea Boogie MD

## 2023-11-30 NOTE — PATIENT INSTRUCTIONS
Thank you for letting us take care of you today. We hope all your questions were addressed. If a question was overlooked or something else comes to mind after you return home, please contact a member of your Care Team listed below. Your Care Team at [address] [place] [address] is Team #2  Paolo Rodriguez M.D. (Faculty)  Lucy Jimenez, (Resident)  Alison Cain, (Resident)  Marbin Lanier, (Resident)  Eric Monte, (Resident)  [facility] Joey, (Resident)  Inocencia Jeremi, [address] [place] [address], [facility]  Yessica Eaton,  [facility]  Jose Muro, [facility]  Girish Redgranite, [facility]  Kilo Jeffrey, [facility]  Rachel Park) [place], [place] ([address] [facility] [address])  Clay Sanchez, [facility] (Clinical Pharmacist)     Office phone number: [phone]    If you need to get in right away due to illness, please be advised we have \"Same Day\" appointments available Monday-Friday. Please call us at [phone] option #3 to schedule your \"Same Day\" appointment.

## 2023-12-12 ENCOUNTER — HOSPITAL ENCOUNTER (OUTPATIENT)
Dept: PAIN MANAGEMENT | Age: 53
Discharge: HOME | End: 2023-12-12
Payer: COMMERCIAL

## 2023-12-12 VITALS — HEIGHT: 70 IN | WEIGHT: 293 LBS | BODY MASS INDEX: 41.95 KG/M2

## 2023-12-12 DIAGNOSIS — M54.16 LEFT LUMBAR RADICULOPATHY: ICD-10-CM

## 2023-12-12 DIAGNOSIS — E66.01 MORBID OBESITY (HCC): Primary | ICD-10-CM

## 2023-12-12 DIAGNOSIS — M51.26 LUMBAR DISC HERNIATION: ICD-10-CM

## 2023-12-12 PROBLEM — M48.062 SPINAL STENOSIS OF LUMBAR REGION WITH NEUROGENIC CLAUDICATION: Status: RESOLVED | Noted: 2023-05-04 | Resolved: 2023-12-12

## 2023-12-12 PROCEDURE — 99213 OFFICE O/P EST LOW 20 MIN: CPT | Performed by: NURSE PRACTITIONER

## 2023-12-12 PROCEDURE — 99213 OFFICE O/P EST LOW 20 MIN: CPT

## 2023-12-12 ASSESSMENT — ENCOUNTER SYMPTOMS
BACK PAIN: 1
BOWEL INCONTINENCE: 0
COUGH: 0
CONSTIPATION: 0
SHORTNESS OF BREATH: 0

## 2024-01-03 ENCOUNTER — TELEPHONE (OUTPATIENT)
Dept: FAMILY MEDICINE CLINIC | Age: 54
End: 2024-01-03

## 2024-01-03 DIAGNOSIS — E08.3293 DIABETES MELLITUS DUE TO UNDERLYING CONDITION WITH MILD NONPROLIFERATIVE RETINOPATHY OF BOTH EYES, WITHOUT LONG-TERM CURRENT USE OF INSULIN, MACULAR EDEMA PRESENCE UNSPECIFIED (HCC): Primary | ICD-10-CM

## 2024-01-03 RX ORDER — DULAGLUTIDE 1.5 MG/.5ML
1.5 INJECTION, SOLUTION SUBCUTANEOUS WEEKLY
Qty: 0.5 ML | Refills: 6 | Status: SHIPPED | OUTPATIENT
Start: 2024-01-03

## 2024-01-03 NOTE — TELEPHONE ENCOUNTER
[facility] called and would like to know since Trulicity was increased they would like to know if you want to change the pen to the 1.5. Please advise.

## 2024-01-03 NOTE — TELEPHONE ENCOUNTER
Received msg from pharmacy that patient's Trulicity pen order to be changed to 1.5 mg as dose was changed recently. Order was placed today.

## 2024-03-04 ENCOUNTER — TELEPHONE (OUTPATIENT)
Dept: FAMILY MEDICINE CLINIC | Age: 54
End: 2024-03-04

## 2024-03-04 NOTE — TELEPHONE ENCOUNTER
Patient calling stating TRULICITY is on backorder every where. He is suppose to take it every Sunday but is out. He states he does have 2 pens left over from previous script of Victoza and is wondering if he should take that until they are able to get medication - or what does PCP recommend. Please advise

## 2024-03-05 DIAGNOSIS — E08.3293 DIABETES MELLITUS DUE TO UNDERLYING CONDITION WITH MILD NONPROLIFERATIVE RETINOPATHY OF BOTH EYES, WITHOUT LONG-TERM CURRENT USE OF INSULIN, MACULAR EDEMA PRESENCE UNSPECIFIED (HCC): ICD-10-CM

## 2024-03-05 RX ORDER — METFORMIN HYDROCHLORIDE 500 MG/1
1000 TABLET, EXTENDED RELEASE ORAL 2 TIMES DAILY
Qty: 120 TABLET | Refills: 5 | Status: SHIPPED | OUTPATIENT
Start: 2024-03-05

## 2024-03-05 NOTE — TELEPHONE ENCOUNTER
Patient would like a printed copy of medication.   
              Patient Active Problem List:     Elevated blood pressure reading     Smoker     Morbid obesity (HCC)     Need for vaccination     Right arm numbness     Hypovitaminosis D     Tobacco abuse     Essential hypertension     Smoking     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     Acute left-sided low back pain with left-sided sciatica     Left lumbar radiculopathy     Lumbar disc herniation     Mild nonproliferative diabetic retinopathy associated with type 2 diabetes mellitus (HCC)     Mixed hyperlipidemia

## 2024-03-14 ENCOUNTER — OFFICE VISIT (OUTPATIENT)
Dept: FAMILY MEDICINE CLINIC | Age: 54
End: 2024-03-14
Payer: COMMERCIAL

## 2024-03-14 VITALS
SYSTOLIC BLOOD PRESSURE: 121 MMHG | WEIGHT: 301.8 LBS | HEIGHT: 70 IN | DIASTOLIC BLOOD PRESSURE: 71 MMHG | HEART RATE: 85 BPM | BODY MASS INDEX: 43.2 KG/M2

## 2024-03-14 DIAGNOSIS — E08.3293 DIABETES MELLITUS DUE TO UNDERLYING CONDITION WITH MILD NONPROLIFERATIVE RETINOPATHY OF BOTH EYES, WITHOUT LONG-TERM CURRENT USE OF INSULIN, MACULAR EDEMA PRESENCE UNSPECIFIED (HCC): Primary | ICD-10-CM

## 2024-03-14 LAB — HBA1C MFR BLD: 9.4 %

## 2024-03-14 PROCEDURE — 3074F SYST BP LT 130 MM HG: CPT

## 2024-03-14 PROCEDURE — 3078F DIAST BP <80 MM HG: CPT

## 2024-03-14 PROCEDURE — 99213 OFFICE O/P EST LOW 20 MIN: CPT

## 2024-03-14 PROCEDURE — 83036 HEMOGLOBIN GLYCOSYLATED A1C: CPT

## 2024-03-14 RX ORDER — TIRZEPATIDE 5 MG/.5ML
5 INJECTION, SOLUTION SUBCUTANEOUS WEEKLY
Status: CANCELLED | OUTPATIENT
Start: 2024-03-14

## 2024-03-14 SDOH — ECONOMIC STABILITY: FOOD INSECURITY: WITHIN THE PAST 12 MONTHS, YOU WORRIED THAT YOUR FOOD WOULD RUN OUT BEFORE YOU GOT MONEY TO BUY MORE.: SOMETIMES TRUE

## 2024-03-14 SDOH — ECONOMIC STABILITY: INCOME INSECURITY: HOW HARD IS IT FOR YOU TO PAY FOR THE VERY BASICS LIKE FOOD, HOUSING, MEDICAL CARE, AND HEATING?: SOMEWHAT HARD

## 2024-03-14 SDOH — ECONOMIC STABILITY: FOOD INSECURITY: WITHIN THE PAST 12 MONTHS, THE FOOD YOU BOUGHT JUST DIDN'T LAST AND YOU DIDN'T HAVE MONEY TO GET MORE.: SOMETIMES TRUE

## 2024-03-14 ASSESSMENT — PATIENT HEALTH QUESTIONNAIRE - PHQ9
SUM OF ALL RESPONSES TO PHQ QUESTIONS 1-9: 0
SUM OF ALL RESPONSES TO PHQ QUESTIONS 1-9: 0
SUM OF ALL RESPONSES TO PHQ9 QUESTIONS 1 & 2: 0
SUM OF ALL RESPONSES TO PHQ QUESTIONS 1-9: 0
2. FEELING DOWN, DEPRESSED OR HOPELESS: 0
1. LITTLE INTEREST OR PLEASURE IN DOING THINGS: 0
SUM OF ALL RESPONSES TO PHQ QUESTIONS 1-9: 0

## 2024-03-14 ASSESSMENT — ENCOUNTER SYMPTOMS
GASTROINTESTINAL NEGATIVE: 1
RESPIRATORY NEGATIVE: 1

## 2024-03-14 NOTE — PATIENT INSTRUCTIONS
Thank you for letting us take care of you today. We hope all your questions were addressed. If a question was overlooked or something else comes to mind after you return home, please contact a member of your Care Team listed below.      Your Care Team at [facility] is Team #2  Jere Mancera M.D. (Faculty)  Deysi Swartz, (Resident)  Nithya Monte, (Resident)  Rizwana Duarte (Resident)  Adin Riley, (Resident)  Chelsea Cardenas, [facility]  Breezy Lee, [facility]  Olga Rebolledo, [facility]  Shawanda Wilcox, [facility]  Radhika Day,  [facility]  Sherrie Heard, [facility]  Joann Joy, [facility]  Latosha Iraheta, [facility]  Rachel (LJ) Nick [facility] (Clinical Practice Manager)  Luz Perry [facility] (Clinical Pharmacist)     Office phone number: [phone]    If you need to get in right away due to illness, please be advised we have \"Same Day\" appointments available Monday-Friday. Please call us at [phone] option #3 to schedule your \"Same Day\" appointment.       [facility] Financial Resources*  (Call 211 if need more resources).     [facility] Service [facility]:  What they offer:  Electric and gas payment services for veterans  Phone Number: (180) 774-7697 Service-Intake    [facility] Action Partnership (GLCAP):   Remedios Patel [place], [place], [place], [place], [place], [place],      [place]  counkeyla  What they offer: Assistance with utility and housing expenses.  Phone Number: [phone]     [facility] Community Action Commission (NOC[employer]):   Chai Dias [place], [place], [place], and [place] counites  What they offer: Services for homelessness, housing, and home weatherization and repair.  Phone Number: [phone]    [facility]:   [facility]  What they offer: Heating Fuel Payment Assistance, Electric Service Payment Assistance and Water Service Payment Assistance  Phone Number: [phone]) [phone] ext: x11 Service-Intake    [employer]:  What they offer: Heat, Electric, Gas and water payment  yes

## 2024-03-14 NOTE — PROGRESS NOTES
Attending Physician Statement  I have discussed the care of Arnaldo Ornelas, including pertinent history and exam findings,  with the resident. I have reviewed the key elements of all parts of the encounter with the resident.  I agree with the assessment, plan and orders as documented by the resident.  (GE Modifier)    Carla Red MD  
Depression Screen  12/21/2024    DTaP/Tdap/Td vaccine (3 - Td or Tdap) 06/17/2032    Shingles vaccine  Completed    Hepatitis C screen  Completed    HIV screen  Completed    Hepatitis A vaccine  Aged Out    Hib vaccine  Aged Out    Polio vaccine  Aged Out    Meningococcal (ACWY) vaccine  Aged Out    Diabetes screen  Discontinued     
Barriers to medication compliance addressed.      All patient questions answered.  Pt voiced understanding.     Return in about 2 months (around 5/14/2024) for dm f/u.        Disclaimer: Some orall of this note was transcribed using voice-recognition software.This may cause typographical errors occasionally. Although all effort is made to fix these errors, please do not hesitate to contact our office if there isany concern with the understanding of this note.

## 2024-04-01 ENCOUNTER — TELEPHONE (OUTPATIENT)
Dept: FAMILY MEDICINE CLINIC | Age: 54
End: 2024-04-01

## 2024-04-01 NOTE — TELEPHONE ENCOUNTER
Patient called stating , when he was seen , he received another patient's paperwork.  (Lauren Welsh) He just wanted someone to know.. Sydnie was informed .

## 2024-04-26 ENCOUNTER — OFFICE VISIT (OUTPATIENT)
Dept: FAMILY MEDICINE CLINIC | Age: 54
End: 2024-04-26

## 2024-04-26 VITALS
HEART RATE: 93 BPM | OXYGEN SATURATION: 95 % | SYSTOLIC BLOOD PRESSURE: 104 MMHG | DIASTOLIC BLOOD PRESSURE: 60 MMHG | WEIGHT: 305.6 LBS | HEIGHT: 70 IN | BODY MASS INDEX: 43.75 KG/M2

## 2024-04-26 DIAGNOSIS — E08.3293 DIABETES MELLITUS DUE TO UNDERLYING CONDITION WITH MILD NONPROLIFERATIVE RETINOPATHY OF BOTH EYES, WITHOUT LONG-TERM CURRENT USE OF INSULIN, MACULAR EDEMA PRESENCE UNSPECIFIED (HCC): Primary | ICD-10-CM

## 2024-04-26 RX ORDER — DULAGLUTIDE 0.75 MG/.5ML
0.75 INJECTION, SOLUTION SUBCUTANEOUS WEEKLY
Qty: 0.5 ML | Refills: 2 | Status: SHIPPED | OUTPATIENT
Start: 2024-04-26

## 2024-04-26 ASSESSMENT — ENCOUNTER SYMPTOMS
SHORTNESS OF BREATH: 0
COUGH: 0

## 2024-04-26 NOTE — PROGRESS NOTES
DIABETES and HYPERTENSION visit    BP Readings from Last 3 Encounters:   24 121/71   23 118/71   23 106/65        Hemoglobin A1C (%)   Date Value   2024 9.4   2023 7.4   10/14/2023 7.7 (H)     LDL Cholesterol (mg/dL)   Date Value   10/14/2023 91     HDL (mg/dL)   Date Value   10/14/2023 45     BUN (mg/dL)   Date Value   10/14/2023 18     Creatinine (mg/dL)   Date Value   10/14/2023 1.0     Glucose (mg/dL)   Date Value   10/14/2023 143 (H)            Have you changed or started any medications since your last visit including any over-the-counter medicines, vitamins, or herbal medicines? no   Have you stopped taking any of your medications? Is so, why? -  no  Are you having any side effects from any of your medications? - no    Have you seen any other physician or provider since your last visit?  no   Have you had any other diagnostic tests since your last visit?  no   Have you been seen in the emergency room and/or had an admission in a hospital since we last saw you?  no   Have you had your routine dental cleaning in the past 6 months?  no     Have you had your annual diabetic retinal (eye) exam? No   (ensure copy of exam is in the chart)    Do you have an active MyChart account? If no, what is the barrier?  No:     Patient Care Team:  Davie Haas MD as PCP - General (Family Medicine)  Reese Cuellar MD as PCP - Empaneled Provider    Medical History Review  Past Medical, Family, and Social History reviewed and does not contribute to the patient presenting condition    Health Maintenance   Topic Date Due    Hepatitis B vaccine (1 of 3 - 3-dose series) Never done    Pneumococcal 0-64 years Vaccine (2 of 2 - PCV) 2017    COVID-19 Vaccine (3 -  season) 2023    Diabetic retinal exam  2024    Colorectal Cancer Screen  05/10/2024    A1C test (Diabetic or Prediabetic)  2024    Flu vaccine (Season Ended) 2024    Low dose CT lung screening &/or

## 2024-04-26 NOTE — PROGRESS NOTES
Attending Physician Statement  I have discussed the care of Arnaldo Ornelas, including pertinent history and exam findings,  with the resident. I have reviewed the key elements of all parts of the encounter with the resident.  I agree with the assessment, plan and orders as documented by the resident.  (GE Modifier)    Carla Red MD

## 2024-04-26 NOTE — PROGRESS NOTES
[facility] Residency Program - Outpatient Note      Subjective:    Arnaldo Ornelas is a 53 y.o. male with  has a past medical history of Acute left-sided low back pain with left-sided sciatica, Essential hypertension, Lumbar back pain, Mixed hyperlipidemia, Morbid obesity (HCC), and Tobacco abuse.    Presented to the office today for:  Chief Complaint   Patient presents with    Discuss Medications     Patient would like to discuss his medications        HPI    Patient is a 53-year-old male presenting today for medication follow-up.  Reportedly patient was started on Januvia 50 mg.  After the pharmacy did not have Trulicity available and 1.5 dose  Patient reports that Januvia is not really helping control his blood glucose level and his last A1c went up to 9.4.  Repeat patient is requesting to be restarted on Trulicity even if states that the lower dose and he will try to control it with his diet and exercise    Previously patient reportedly took Januvia but was not really content with the side effects, reportedly had increased urination and thirst      Review of Systems   Constitutional:  Negative for activity change and appetite change.   Respiratory:  Negative for cough and shortness of breath.    Cardiovascular:  Negative for chest pain.   Genitourinary: Negative.    Musculoskeletal: Negative.    Neurological: Negative.    Hematological: Negative.    Psychiatric/Behavioral: Negative.                   The patient has a   Family History   Problem Relation Age of Onset    Heart Disease Mother     High Blood Pressure Mother     Diabetes Mother     Cancer Father        Objective:    /60 (Site: Left Upper Arm, Position: Sitting, Cuff Size: Large Adult)   Pulse 93   Ht 1.778 m (5' 10\")   Wt (!) 138.6 kg (305 lb 9.6 oz)   SpO2 95%   BMI 43.85 kg/m²    BP Readings from Last 3 Encounters:   04/26/24 104/60   03/14/24 121/71   12/21/23 118/71       Physical

## 2024-04-29 ENCOUNTER — TELEPHONE (OUTPATIENT)
Dept: FAMILY MEDICINE CLINIC | Age: 54
End: 2024-04-29

## 2024-04-29 DIAGNOSIS — E08.3293 DIABETES MELLITUS DUE TO UNDERLYING CONDITION WITH MILD NONPROLIFERATIVE RETINOPATHY OF BOTH EYES, WITHOUT LONG-TERM CURRENT USE OF INSULIN, MACULAR EDEMA PRESENCE UNSPECIFIED (HCC): Primary | ICD-10-CM

## 2024-04-29 NOTE — TELEPHONE ENCOUNTER
Patient called stating pharmacy informed him that his Trulicity only comes in a 4 pack and they don't want to break the pack up could you resend script for the 4 pack.  Please advise and thank you.

## 2024-04-30 RX ORDER — DULAGLUTIDE 0.75 MG/.5ML
0.75 INJECTION, SOLUTION SUBCUTANEOUS WEEKLY
Qty: 0.5 ML | Refills: 4 | Status: SHIPPED | OUTPATIENT
Start: 2024-04-30

## 2024-06-11 DIAGNOSIS — E78.2 MIXED HYPERLIPIDEMIA: ICD-10-CM

## 2024-06-12 RX ORDER — ROSUVASTATIN CALCIUM 20 MG/1
20 TABLET, COATED ORAL DAILY
Qty: 30 TABLET | Refills: 5 | Status: SHIPPED | OUTPATIENT
Start: 2024-06-12

## 2024-06-12 NOTE — TELEPHONE ENCOUNTER
E-scribe request for crestor. Please review and e-scribe if applicable.     Last Visit Date:  4/26/24  Next Visit Date:  6/13/2024    Hemoglobin A1C (%)   Date Value   03/14/2024 9.4   11/30/2023 7.4   10/14/2023 7.7 (H)             ( goal A1C is < 7)   No components found for: \"LABMICR\"  No components found for: \"LDLCHOLESTEROL\", \"LDLCALC\"    (goal LDL is <100)   AST (U/L)   Date Value   03/12/2016 19     ALT (U/L)   Date Value   03/12/2016 21     BUN (mg/dL)   Date Value   10/14/2023 18     BP Readings from Last 3 Encounters:   04/26/24 104/60   03/14/24 121/71   12/21/23 118/71          (goal 120/80)        Patient Active Problem List:     Elevated blood pressure reading     Smoker     Morbid obesity (HCC)     Need for vaccination     Right arm numbness     Hypovitaminosis D     Tobacco abuse     Essential hypertension     Smoking     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     Acute left-sided low back pain with left-sided sciatica     Left lumbar radiculopathy     Lumbar disc herniation     Mild nonproliferative diabetic retinopathy associated with type 2 diabetes mellitus (HCC)     Mixed hyperlipidemia       Just FYI, he is managing this with

## 2024-06-13 ENCOUNTER — OFFICE VISIT (OUTPATIENT)
Dept: FAMILY MEDICINE CLINIC | Age: 54
End: 2024-06-13
Payer: COMMERCIAL

## 2024-06-13 VITALS
SYSTOLIC BLOOD PRESSURE: 134 MMHG | HEART RATE: 93 BPM | BODY MASS INDEX: 42.09 KG/M2 | WEIGHT: 294 LBS | HEIGHT: 70 IN | DIASTOLIC BLOOD PRESSURE: 86 MMHG

## 2024-06-13 DIAGNOSIS — Z12.11 SCREENING FOR COLON CANCER: ICD-10-CM

## 2024-06-13 DIAGNOSIS — E08.3293 DIABETES MELLITUS DUE TO UNDERLYING CONDITION WITH MILD NONPROLIFERATIVE RETINOPATHY OF BOTH EYES, WITHOUT LONG-TERM CURRENT USE OF INSULIN, MACULAR EDEMA PRESENCE UNSPECIFIED (HCC): Primary | ICD-10-CM

## 2024-06-13 LAB — HBA1C MFR BLD: 9.9 %

## 2024-06-13 PROCEDURE — 83036 HEMOGLOBIN GLYCOSYLATED A1C: CPT

## 2024-06-13 RX ORDER — GLIPIZIDE 5 MG/1
5 TABLET ORAL 2 TIMES DAILY
Qty: 60 TABLET | Refills: 0 | Status: SHIPPED | OUTPATIENT
Start: 2024-06-13

## 2024-06-13 RX ORDER — BLOOD-GLUCOSE SENSOR
1 EACH MISCELLANEOUS
Qty: 1 EACH | Refills: 4 | Status: SHIPPED | OUTPATIENT
Start: 2024-06-13 | End: 2024-07-11

## 2024-06-13 RX ORDER — DULAGLUTIDE 1.5 MG/.5ML
1.5 INJECTION, SOLUTION SUBCUTANEOUS WEEKLY
Qty: 2 ML | Refills: 1 | Status: SHIPPED | OUTPATIENT
Start: 2024-06-13 | End: 2024-08-02

## 2024-06-13 ASSESSMENT — ENCOUNTER SYMPTOMS
SHORTNESS OF BREATH: 0
DIARRHEA: 0
WHEEZING: 0
COUGH: 0
RECTAL PAIN: 0
CONSTIPATION: 0
STRIDOR: 0
BACK PAIN: 1

## 2024-06-13 NOTE — PROGRESS NOTES
[facility] Residency Program - Outpatient Note      Subjective:    Arnaldo Ornelas is a 53 y.o. male with  has a past medical history of Acute left-sided low back pain with left-sided sciatica, Essential hypertension, Lumbar back pain, Mixed hyperlipidemia, Morbid obesity (HCC), and Tobacco abuse.    Presented to the office today for:  Chief Complaint   Patient presents with    Diabetes     Last A1C was 9.4 on 3/14/24       HPI    54-year-old male with 35-pack-year smoking history, type 2 diabetes mellitus, hypertension, hyperlipidemia presented to office today for a follow-up visit on his diabetes mellitus.  Patient denies any acute concerns, he denies any chest pain, shortness of breath, leg swelling, any tenderness and numbness sensation in the legs, he does mention that he has spinal stenosis that he get back pain and leg pain from that but no acute concerns, patient HbA1c today 9.9, 3 months ago it was 9.4, patient has been using Trulicity 0.75 weekly, metformin 500 extended release twice daily, he ran into availability/insurance issue in the past with Trulicity, and it has been recently started in April, but so far he said that he is only able to get 0.75 weekly, he also mentioned in the past he was on 1.5 Trulicity weekly and his blood sugars were well-controlled.  Patient also mentioned that he tried Jardiance and Januvia both in the past, had similar side effects of urinary infection, increased urinary frequency and excessive thirst.  Did not want to use them again.    Discussed with him that we are can increase that dose of Trulicity back to 1.5, we will add glipizide 5 mg, patient has been advised to keep up blood sugar log fasting and postprandial 2 hours after meal.  We have also ordered CGM zander freestyle 3 for him, consulted pharmacy also.    Patient will have an diabetic retinal exam soon, advised to bring paperwork  Health care gap discussed with the patient

## 2024-06-13 NOTE — PROGRESS NOTES
Visit Information    Have you changed or started any medications since your last visit including any over-the-counter medicines, vitamins, or herbal medicines? no   Have you stopped taking any of your medications? Is so, why? -  no  Are you having any side effects from any of your medications? - no    Have you seen any other physician or provider since your last visit?  no   Have you had any other diagnostic tests since your last visit?  no   Have you been seen in the emergency room and/or had an admission in a hospital since we last saw you?  no   Have you had your routine dental cleaning in the past 6 months?  no     Do you have an active MyChart account? If no, what is the barrier?  No:     Patient Care Team:  Davie Haas MD as PCP - General (Family Medicine)  Carla Red MD as PCP - Empaneled Provider    Medical History Review  Past Medical, Family, and Social History reviewed and does contribute to the patient presenting condition    Health Maintenance   Topic Date Due    Hepatitis B vaccine (1 of 3 - 3-dose series) Never done    Pneumococcal 0-64 years Vaccine (2 of 2 - PCV) 08/25/2017    COVID-19 Vaccine (3 - 2023-24 season) 09/01/2023    Diabetic retinal exam  03/08/2024    Colorectal Cancer Screen  05/10/2024    A1C test (Diabetic or Prediabetic)  06/14/2024    Flu vaccine (Season Ended) 08/01/2024    Low dose CT lung screening &/or counseling  09/07/2024    Diabetic Alb to Cr ratio (uACR) test  10/14/2024    Lipids  10/14/2024    GFR test (Diabetes, CKD 3-4, OR last GFR 15-59)  10/14/2024    Diabetic foot exam  12/21/2024    Depression Screen  03/14/2025    DTaP/Tdap/Td vaccine (3 - Td or Tdap) 06/17/2032    Shingles vaccine  Completed    Hepatitis C screen  Completed    HIV screen  Completed    Hepatitis A vaccine  Aged Out    Hib vaccine  Aged Out    Polio vaccine  Aged Out    Meningococcal (ACWY) vaccine  Aged Out    Diabetes screen  Discontinued

## 2024-06-13 NOTE — PROGRESS NOTES
Attending Physician Statement  I  have discussed the care of Arnaldo Ornelas including pertinent history and exam findings with the resident. I agree with the assessment, plan and orders as documented by the resident.      /86 (Site: Right Upper Arm, Position: Sitting, Cuff Size: Medium Adult)   Pulse 93   Ht 1.778 m (5' 10\")   Wt 133.4 kg (294 lb)   BMI 42.18 kg/m²    BP Readings from Last 3 Encounters:   06/13/24 134/86   04/26/24 104/60   03/14/24 121/71     Wt Readings from Last 3 Encounters:   06/13/24 133.4 kg (294 lb)   04/26/24 (!) 138.6 kg (305 lb 9.6 oz)   03/14/24 (!) 136.9 kg (301 lb 12.8 oz)          Diagnosis Orders   1. Diabetes mellitus due to underlying condition with mild nonproliferative retinopathy of both eyes, without long-term current use of insulin, macular edema presence unspecified (HCC)  POCT glycosylated hemoglobin (Hb A1C)    dulaglutide (TRULICITY) 1.5 MG/0.5ML SC injection    glipiZIDE (GLUCOTROL) 5 MG tablet    Continuous Glucose Sensor (FREESTYLE ALANNA 3 SENSOR) [facility] Pharmacist      2. Screening for colon cancer  Fecal DNA Colorectal cancer screening (Cologuard)        Here for DM- not well controlled. Increase Trulicity and start low dose glipizide with blood glucose log bid, titrate medications per readings at next visit. Has not tolerated januvia or jardiance in the past.       Valerie Ballesteros DO 6/14/2024 9:16 AM

## 2024-06-18 DIAGNOSIS — I10 ESSENTIAL HYPERTENSION: ICD-10-CM

## 2024-06-19 RX ORDER — LISINOPRIL 20 MG/1
TABLET ORAL
Qty: 60 TABLET | Refills: 0 | Status: SHIPPED | OUTPATIENT
Start: 2024-06-19 | End: 2024-07-18

## 2024-06-19 NOTE — TELEPHONE ENCOUNTER
E-scribe request for lisinopril. Please review and e-scribe if applicable.     Last Visit Date:  6/13/2024  Next Visit Date:  7/9/2024    Hemoglobin A1C (%)   Date Value   06/13/2024 9.9   03/14/2024 9.4   11/30/2023 7.4             ( goal A1C is < 7)   No components found for: \"LABMICR\"  No components found for: \"LDLCHOLESTEROL\", \"LDLCALC\"    (goal LDL is <100)   AST (U/L)   Date Value   03/12/2016 19     ALT (U/L)   Date Value   03/12/2016 21     BUN (mg/dL)   Date Value   10/14/2023 18     BP Readings from Last 3 Encounters:   06/13/24 134/86   04/26/24 104/60   03/14/24 121/71          (goal 120/80)        Patient Active Problem List:     Elevated blood pressure reading     Smoker     Morbid obesity (HCC)     Need for vaccination     Right arm numbness     Hypovitaminosis D     Tobacco abuse     Essential hypertension     Smoking     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     Acute left-sided low back pain with left-sided sciatica     Left lumbar radiculopathy     Lumbar disc herniation     Mild nonproliferative diabetic retinopathy associated with type 2 diabetes mellitus (HCC)     Mixed hyperlipidemia      ----JF

## 2024-07-06 DIAGNOSIS — E08.3293 DIABETES MELLITUS DUE TO UNDERLYING CONDITION WITH MILD NONPROLIFERATIVE RETINOPATHY OF BOTH EYES, WITHOUT LONG-TERM CURRENT USE OF INSULIN, MACULAR EDEMA PRESENCE UNSPECIFIED (HCC): ICD-10-CM

## 2024-07-08 RX ORDER — GLIPIZIDE 5 MG/1
5 TABLET ORAL 2 TIMES DAILY
Qty: 60 TABLET | Refills: 0 | Status: SHIPPED | OUTPATIENT
Start: 2024-07-08

## 2024-07-08 NOTE — TELEPHONE ENCOUNTER
E-scribe request for glipizide. Please review and e-scribe if applicable.     Last Visit Date:  6/13/24  Next Visit Date:  7/9/2024    Hemoglobin A1C (%)   Date Value   06/13/2024 9.9   03/14/2024 9.4   11/30/2023 7.4             ( goal A1C is < 7)   No components found for: \"LABMICR\"  No components found for: \"LDLCHOLESTEROL\", \"LDLCALC\"    (goal LDL is <100)   AST (U/L)   Date Value   03/12/2016 19     ALT (U/L)   Date Value   03/12/2016 21     BUN (mg/dL)   Date Value   10/14/2023 18     BP Readings from Last 3 Encounters:   06/13/24 134/86   04/26/24 104/60   03/14/24 121/71          (goal 120/80)        Patient Active Problem List:     Elevated blood pressure reading     Smoker     Morbid obesity (HCC)     Need for vaccination     Right arm numbness     Hypovitaminosis D     Tobacco abuse     Essential hypertension     Smoking     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     Acute left-sided low back pain with left-sided sciatica     Left lumbar radiculopathy     Lumbar disc herniation     Mild nonproliferative diabetic retinopathy associated with type 2 diabetes mellitus (HCC)     Mixed hyperlipidemia

## 2024-07-09 ENCOUNTER — OFFICE VISIT (OUTPATIENT)
Dept: FAMILY MEDICINE CLINIC | Age: 54
End: 2024-07-09
Payer: COMMERCIAL

## 2024-07-09 VITALS
DIASTOLIC BLOOD PRESSURE: 71 MMHG | WEIGHT: 299.2 LBS | HEIGHT: 70 IN | HEART RATE: 90 BPM | BODY MASS INDEX: 42.83 KG/M2 | SYSTOLIC BLOOD PRESSURE: 114 MMHG

## 2024-07-09 DIAGNOSIS — I10 ESSENTIAL HYPERTENSION: ICD-10-CM

## 2024-07-09 DIAGNOSIS — E08.3293 DIABETES MELLITUS DUE TO UNDERLYING CONDITION WITH MILD NONPROLIFERATIVE RETINOPATHY OF BOTH EYES, WITHOUT LONG-TERM CURRENT USE OF INSULIN, MACULAR EDEMA PRESENCE UNSPECIFIED (HCC): Primary | ICD-10-CM

## 2024-07-09 DIAGNOSIS — Z01.00 VISION TEST: ICD-10-CM

## 2024-07-09 PROCEDURE — 3074F SYST BP LT 130 MM HG: CPT

## 2024-07-09 PROCEDURE — 99213 OFFICE O/P EST LOW 20 MIN: CPT

## 2024-07-09 PROCEDURE — 3078F DIAST BP <80 MM HG: CPT

## 2024-07-09 RX ORDER — BLOOD-GLUCOSE SENSOR
1 EACH MISCELLANEOUS
Qty: 1 EACH | Refills: 4 | Status: SHIPPED | OUTPATIENT
Start: 2024-07-09 | End: 2024-07-10 | Stop reason: SDUPTHER

## 2024-07-09 ASSESSMENT — PATIENT HEALTH QUESTIONNAIRE - PHQ9
2. FEELING DOWN, DEPRESSED OR HOPELESS: NOT AT ALL
SUM OF ALL RESPONSES TO PHQ QUESTIONS 1-9: 0
SUM OF ALL RESPONSES TO PHQ QUESTIONS 1-9: 0
1. LITTLE INTEREST OR PLEASURE IN DOING THINGS: NOT AT ALL
SUM OF ALL RESPONSES TO PHQ QUESTIONS 1-9: 0
SUM OF ALL RESPONSES TO PHQ9 QUESTIONS 1 & 2: 0
SUM OF ALL RESPONSES TO PHQ QUESTIONS 1-9: 0

## 2024-07-09 ASSESSMENT — ENCOUNTER SYMPTOMS
GASTROINTESTINAL NEGATIVE: 1
RESPIRATORY NEGATIVE: 1

## 2024-07-09 NOTE — PROGRESS NOTES
Attending Physician Statement  I  have discussed the care of Arnaldo Ornelas including pertinent history and exam findings with the resident. I agree with the assessment, plan and orders as documented by the resident.      /71 (Site: Right Upper Arm, Position: Sitting)   Pulse 90   Ht 1.778 m (5' 10\")   Wt 135.7 kg (299 lb 3.2 oz)   BMI 42.93 kg/m²    BP Readings from Last 3 Encounters:   07/09/24 114/71   06/13/24 134/86   04/26/24 104/60     Wt Readings from Last 3 Encounters:   07/09/24 135.7 kg (299 lb 3.2 oz)   06/13/24 133.4 kg (294 lb)   04/26/24 (!) 138.6 kg (305 lb 9.6 oz)          Diagnosis Orders   1. Diabetes mellitus due to underlying condition with mild nonproliferative retinopathy of both eyes, without long-term current use of insulin, macular edema presence unspecified (HCC)  External Referral To Ophthalmology    DISCONTINUED: Continuous Glucose Sensor (FREESTYLE ALANNA 3 SENSOR) MISC      2. Essential hypertension        3. Vision test  External Referral To Optometry              Valerie Ballesteros DO 7/11/2024 1:56 PM

## 2024-07-09 NOTE — PROGRESS NOTES
[facility] Residency Program - Outpatient Note      Subjective:    Arnaldo Ornelas is a 53 y.o. male with  has a past medical history of Acute left-sided low back pain with left-sided sciatica, Essential hypertension, Lumbar back pain, Mixed hyperlipidemia, Morbid obesity (HCC), and Tobacco abuse.    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    HENT: Negative.     Respiratory: Negative.     Cardiovascular: Negative.    Gastrointestinal: Negative.    Genitourinary: Negative.    Musculoskeletal: Negative.    Neurological: Negative.    Psychiatric/Behavioral: Negative.                   The patient has a   Family History   Problem Relation Age of Onset    Heart Disease Mother     High Blood Pressure Mother     Diabetes Mother     Cancer Father        Objective:    /71 (Site: Right Upper Arm, Position: Sitting)   Pulse 90   Ht 1.778 m (5' 10\")   Wt 135.7 kg (299 lb 3.2 oz)   BMI 42.93 kg/m²    BP Readings from Last 3 Encounters:   07/09/24 114/71

## 2024-07-09 NOTE — PROGRESS NOTES
Visit Information    Have you changed or started any medications since your last visit including any over-the-counter medicines, vitamins, or herbal medicines? no   Are you having any side effects from any of your medications? -  no  Have you stopped taking any of your medications? Is so, why? -  no    Have you seen any other physician or provider since your last visit? No  Have you had any other diagnostic tests since your last visit? No  Have you been seen in the emergency room and/or had an admission to a hospital since we last saw you? No  Have you had your routine dental cleaning in the past 6 months? no    Have you activated your [employer] account? If not, what are your barriers? No:      Patient Care Team:  Davie Haas MD as PCP - General (Family Medicine)  Carla Red MD as PCP - Empaneled Provider    Medical History Review  Past Medical, Family, and Social History reviewed and does not contribute to the patient presenting condition    Health Maintenance   Topic Date Due    Hepatitis B vaccine (1 of 3 - 3-dose series) Never done    Pneumococcal 0-64 years Vaccine (2 of 2 - PCV) 08/25/2017    COVID-19 Vaccine (3 - 2023-24 season) 09/01/2023    Diabetic retinal exam  03/08/2024    Colorectal Cancer Screen  05/10/2024    Flu vaccine (1) 08/01/2024    Low dose CT lung screening &/or counseling  09/07/2024    A1C test (Diabetic or Prediabetic)  09/13/2024    Diabetic Alb to Cr ratio (uACR) test  10/14/2024    Lipids  10/14/2024    GFR test (Diabetes, CKD 3-4, OR last GFR 15-59)  10/14/2024    Diabetic foot exam  12/21/2024    Depression Screen  03/14/2025    DTaP/Tdap/Td vaccine (3 - Td or Tdap) 06/17/2032    Shingles vaccine  Completed    Hepatitis C screen  Completed    HIV screen  Completed    Hepatitis A vaccine  Aged Out    Hib vaccine  Aged Out    Polio vaccine  Aged Out    Meningococcal (ACWY) vaccine  Aged Out    Diabetes screen  Discontinued

## 2024-07-09 NOTE — PATIENT INSTRUCTIONS
Thank you for letting us take care of you today. We hope all your questions were addressed. If a question was overlooked or something else comes to mind after you return home, please contact a member of your Care Team listed below.        Your Care Team at [facility] is Team #4  Valerie Ballesteros M.D. (Faculty)  Davie Haas M.D. (Resident)  Kristen Hanley M.D.  (Resident)  Amaris Gregory M.D. (Resident)  Sohail Pierre M.D. (Resident)  Breezy Lee, TATA Rebolledo, TATA Wilcox, [facility]  Latosha Iraheta, [facility]  Sherrie Heard, [facility]  Radhika Day, TATA Joy, TATA Ramos (LJ) [facility] Romero (Clinical Practice Manager)  Luz Perry [facility] (Clinical Pharmacist)       Office phone number: [phone]    If you need to get in right away due to illness, please be advised we have \"Same Day\" appointments available Monday-Friday. Please call us at [phone] option #3 to schedule your \"Same Day\" appointment.

## 2024-07-10 DIAGNOSIS — E08.3293 DIABETES MELLITUS DUE TO UNDERLYING CONDITION WITH MILD NONPROLIFERATIVE RETINOPATHY OF BOTH EYES, WITHOUT LONG-TERM CURRENT USE OF INSULIN, MACULAR EDEMA PRESENCE UNSPECIFIED (HCC): ICD-10-CM

## 2024-07-10 LAB — NONINV COLON CA DNA+OCC BLD SCRN STL QL: POSITIVE

## 2024-07-10 RX ORDER — BLOOD-GLUCOSE SENSOR
1 EACH MISCELLANEOUS
Qty: 1 EACH | Refills: 4 | Status: SHIPPED | OUTPATIENT
Start: 2024-07-10 | End: 2024-08-07

## 2024-07-10 NOTE — TELEPHONE ENCOUNTER
Pharmacy contacting office because the direction on patient glucometer are not clear. Please review and correct directions.

## 2024-07-11 DIAGNOSIS — R19.5 POSITIVE COLORECTAL CANCER SCREENING USING COLOGUARD TEST: Primary | ICD-10-CM

## 2024-07-11 NOTE — PROGRESS NOTES
Received patient's Cologuard result-positive, called patient confirmed his identity and reviewed the results with him.  Explained everything, informed that the next step would be colonoscopy patient was very well receptive of the results and has agreed to proceed to colonoscopy.  Will place order and have staff contact him for scheduling the colonoscopy as soon as possible.

## 2024-07-12 NOTE — TELEPHONE ENCOUNTER
Patient called in to schedule , Positive colorectal cancer screening using Cologuard test . Please return call to discuss      Thank you

## 2024-07-15 RX ORDER — SODIUM, POTASSIUM,MAG SULFATES 17.5-3.13G
SOLUTION, RECONSTITUTED, ORAL ORAL
Qty: 1 EACH | Refills: 0 | Status: SHIPPED | OUTPATIENT
Start: 2024-07-15

## 2024-07-15 RX ORDER — BISACODYL 5 MG/1
TABLET, DELAYED RELEASE ORAL
Qty: 4 TABLET | Refills: 0 | Status: SHIPPED | OUTPATIENT
Start: 2024-07-15

## 2024-07-15 NOTE — TELEPHONE ENCOUNTER
Patient calling again to schedule colonoscopy. Left a message 7/12/2024.   Contact patient to schedule   Thanks

## 2024-07-15 NOTE — TELEPHONE ENCOUNTER
TBS/Colonoscopy  Writer left voice message for patient to schedule colonoscopy.   Patient has positive colorectal cancer screening using cologuard. Can schedule patient with any member of the practice to whoever can get patient in sooner.

## 2024-07-18 ENCOUNTER — HOSPITAL ENCOUNTER (OUTPATIENT)
Dept: PREADMISSION TESTING | Age: 54
Discharge: HOME | End: 2024-07-22

## 2024-07-18 VITALS — WEIGHT: 292 LBS | BODY MASS INDEX: 41.8 KG/M2 | HEIGHT: 70 IN

## 2024-07-18 DIAGNOSIS — I10 ESSENTIAL HYPERTENSION: ICD-10-CM

## 2024-07-18 RX ORDER — LISINOPRIL 20 MG/1
TABLET ORAL
Qty: 90 TABLET | Refills: 4 | Status: SHIPPED | OUTPATIENT
Start: 2024-07-18

## 2024-07-18 NOTE — TELEPHONE ENCOUNTER
Last visit: 07/09/24  Last Med refill:   Does patient have enough medication for 72 hours: No:     Next Visit Date:  Future Appointments   Date Time Provider Department Center   7/18/2024  3:30 PM MAHOGANY Leblanc   10/16/2024  1:15 PM Davie Haas MD [id number]       Health Maintenance   Topic Date Due    Hepatitis B vaccine (1 of 3 - 3-dose series) Never done    Pneumococcal 0-64 years Vaccine (2 of 2 - PCV) 08/25/2017    COVID-19 Vaccine (3 - 2023-24 season) 09/01/2023    Diabetic retinal exam  03/08/2024    Flu vaccine (1) 08/01/2024    Lung Cancer Screening &/or Counseling  09/07/2024    A1C test (Diabetic or Prediabetic)  09/13/2024    Diabetic Alb to Cr ratio (uACR) test  10/14/2024    Lipids  10/14/2024    GFR test (Diabetes, CKD 3-4, OR last GFR 15-59)  10/14/2024    Diabetic foot exam  12/21/2024    Depression Screen  07/09/2025    Colorectal Cancer Screen  07/04/2027    DTaP/Tdap/Td vaccine (3 - Td or Tdap) 06/17/2032    Shingles vaccine  Completed    Hepatitis C screen  Completed    HIV screen  Completed    Hepatitis A vaccine  Aged Out    Hib vaccine  Aged Out    Polio vaccine  Aged Out    Meningococcal (ACWY) vaccine  Aged Out    Diabetes screen  Discontinued       Hemoglobin A1C (%)   Date Value   06/13/2024 9.9   03/14/2024 9.4   11/30/2023 7.4             ( goal A1C is < 7)   No components found for: \"LABMICR\"  No components found for: \"LDLCHOLESTEROL\", \"LDLCALC\"    (goal LDL is <100)   AST (U/L)   Date Value   03/12/2016 19     ALT (U/L)   Date Value   03/12/2016 21     BUN (mg/dL)   Date Value   10/14/2023 18     BP Readings from Last 3 Encounters:   07/09/24 114/71   06/13/24 134/86   04/26/24 104/60          (goal 120/80)    All Future Testing planned in [facility]  Lab Frequency Next Occurrence   Lumbar Epidural Steroid Injection/Caudal Once 11/10/2023   Comprehensive Metabolic Panel Once 11/30/2023               Patient Active Problem List:     Elevated blood pressure

## 2024-07-18 NOTE — PROGRESS NOTES
Pre-op Instructions For Out-Patient Endoscopy Surgery    Medication Instructions:  Please stop herbs and any supplements now (includes vitamins and minerals).    Please contact your surgeon and prescribing physician for pre-op instructions for any blood thinners. THIS INCLUDES IBUPROFEN, TAKE ACETAMINOPHEN IF NEEDED    If you have inhalers/aerosol treatments at home, please use them the morning of your surgery and bring the inhalers with you to the hospital.    Please take the following medications the morning of your surgery with a sip of water:    None    Surgery Instructions:  After midnight before surgery:  Do not eat or drink anything, including water, mints, gum, and hard candy.  You may brush your teeth without swallowing.  No smoking, chewing tobacco, or street drugs.    Please shower or bathe before surgery.       Please do not wear any cologne, lotion, powder, jewelry, piercings, perfume, makeup, nail polish, hair accessories, or hair spray on the day of surgery.  Wear loose comfortable clothing.    Leave your valuables at home but bring a payment source for any after-surgery prescriptions you plan to fill at [facility].  Bring a storage case for any glasses/contacts.    An adult who is responsible for you MUST drive you home and should be with you for the first 24 hours after surgery.     The Day of Surgery:  Arrive at [facility] at the time directed by your surgeon and check in at the desk.     If you have a living will or healthcare power of , please bring a copy.    You will be taken to the pre-op holding area where you will be prepared for surgery.  A physical assessment will be performed by a nurse practitioner or house officer.  Your IV will be started and you will meet your anesthesiologist.    When you go to surgery, your family will be directed to the surgical waiting room, where the doctor should speak with them after your surgery.    After

## 2024-07-26 NOTE — PRE-PROCEDURE INSTRUCTIONS
Have you received your Prep? Any questions with prep instructions?   Only Clear Liquid Diet day before.  Nothing to eat after midnight day before procedure.  Are you taking any blood thinners? If so, you need to Stop.  Remove any jewelry and body piercings.  Do you wear glasses? If so, please bring a case to store them in.  Are you having any Covid symptoms?  Do you have any new rashes, infections, etc. that we should be aware of?  Do you have a ride home the day of surgery? It cannot be a cab or medical transportation.  Verify surgery time/date and what time to arrive at hospital.  LEFT VM TO ARRIVE AT 0800

## 2024-07-29 ENCOUNTER — ANESTHESIA EVENT (OUTPATIENT)
Dept: ENDOSCOPY | Age: 54
End: 2024-07-29
Payer: COMMERCIAL

## 2024-07-30 ENCOUNTER — ANESTHESIA (OUTPATIENT)
Dept: ENDOSCOPY | Age: 54
End: 2024-07-30
Payer: COMMERCIAL

## 2024-07-30 ENCOUNTER — HOSPITAL ENCOUNTER (OUTPATIENT)
Age: 54
Setting detail: OUTPATIENT SURGERY
Discharge: HOME | End: 2024-07-30
Attending: STUDENT IN AN ORGANIZED HEALTH CARE EDUCATION/TRAINING PROGRAM | Admitting: STUDENT IN AN ORGANIZED HEALTH CARE EDUCATION/TRAINING PROGRAM
Payer: COMMERCIAL

## 2024-07-30 VITALS
WEIGHT: 292 LBS | BODY MASS INDEX: 41.9 KG/M2 | OXYGEN SATURATION: 94 % | DIASTOLIC BLOOD PRESSURE: 69 MMHG | RESPIRATION RATE: 27 BRPM | TEMPERATURE: 97.9 F | SYSTOLIC BLOOD PRESSURE: 112 MMHG | HEART RATE: 82 BPM

## 2024-07-30 DIAGNOSIS — R19.5 POSITIVE COLORECTAL CANCER SCREENING USING COLOGUARD TEST: ICD-10-CM

## 2024-07-30 LAB — GLUCOSE BLD-MCNC: 116 MG/DL (ref 75–110)

## 2024-07-30 PROCEDURE — 88305 TISSUE EXAM BY PATHOLOGIST: CPT

## 2024-07-30 PROCEDURE — 82947 ASSAY GLUCOSE BLOOD QUANT: CPT

## 2024-07-30 PROCEDURE — 6370000000 HC RX 637 (ALT 250 FOR IP): Performed by: STUDENT IN AN ORGANIZED HEALTH CARE EDUCATION/TRAINING PROGRAM

## 2024-07-30 PROCEDURE — 2500000003 HC RX 250 WO HCPCS: Performed by: NURSE ANESTHETIST, CERTIFIED REGISTERED

## 2024-07-30 PROCEDURE — 3700000000 HC ANESTHESIA ATTENDED CARE: Performed by: STUDENT IN AN ORGANIZED HEALTH CARE EDUCATION/TRAINING PROGRAM

## 2024-07-30 PROCEDURE — 2580000003 HC RX 258: Performed by: ANESTHESIOLOGY

## 2024-07-30 PROCEDURE — 7100000011 HC PHASE II RECOVERY - ADDTL 15 MIN: Performed by: STUDENT IN AN ORGANIZED HEALTH CARE EDUCATION/TRAINING PROGRAM

## 2024-07-30 PROCEDURE — 2709999900 HC NON-CHARGEABLE SUPPLY: Performed by: STUDENT IN AN ORGANIZED HEALTH CARE EDUCATION/TRAINING PROGRAM

## 2024-07-30 PROCEDURE — 3700000001 HC ADD 15 MINUTES (ANESTHESIA): Performed by: STUDENT IN AN ORGANIZED HEALTH CARE EDUCATION/TRAINING PROGRAM

## 2024-07-30 PROCEDURE — 3609010600 HC COLONOSCOPY POLYPECTOMY SNARE/COLD BIOPSY: Performed by: STUDENT IN AN ORGANIZED HEALTH CARE EDUCATION/TRAINING PROGRAM

## 2024-07-30 PROCEDURE — 7100000010 HC PHASE II RECOVERY - FIRST 15 MIN: Performed by: STUDENT IN AN ORGANIZED HEALTH CARE EDUCATION/TRAINING PROGRAM

## 2024-07-30 PROCEDURE — 6360000002 HC RX W HCPCS: Performed by: NURSE ANESTHETIST, CERTIFIED REGISTERED

## 2024-07-30 RX ORDER — SODIUM CHLORIDE 9 MG/ML
INJECTION, SOLUTION INTRAVENOUS CONTINUOUS
Status: DISCONTINUED | OUTPATIENT
Start: 2024-07-30 | End: 2024-07-30 | Stop reason: HOSPADM

## 2024-07-30 RX ORDER — SODIUM CHLORIDE 0.9 % (FLUSH) 0.9 %
5-40 SYRINGE (ML) INJECTION EVERY 12 HOURS SCHEDULED
Status: DISCONTINUED | OUTPATIENT
Start: 2024-07-30 | End: 2024-07-30 | Stop reason: HOSPADM

## 2024-07-30 RX ORDER — LIDOCAINE HYDROCHLORIDE 10 MG/ML
1 INJECTION, SOLUTION EPIDURAL; INFILTRATION; INTRACAUDAL; PERINEURAL
Status: DISCONTINUED | OUTPATIENT
Start: 2024-07-30 | End: 2024-07-30 | Stop reason: HOSPADM

## 2024-07-30 RX ORDER — SODIUM CHLORIDE 9 MG/ML
INJECTION, SOLUTION INTRAVENOUS PRN
Status: DISCONTINUED | OUTPATIENT
Start: 2024-07-30 | End: 2024-07-30 | Stop reason: HOSPADM

## 2024-07-30 RX ORDER — SODIUM CHLORIDE 0.9 % (FLUSH) 0.9 %
5-40 SYRINGE (ML) INJECTION PRN
Status: DISCONTINUED | OUTPATIENT
Start: 2024-07-30 | End: 2024-07-30 | Stop reason: HOSPADM

## 2024-07-30 RX ORDER — PROPOFOL 10 MG/ML
INJECTION, EMULSION INTRAVENOUS PRN
Status: DISCONTINUED | OUTPATIENT
Start: 2024-07-30 | End: 2024-07-30 | Stop reason: SDUPTHER

## 2024-07-30 RX ORDER — PROPOFOL 10 MG/ML
INJECTION, EMULSION INTRAVENOUS CONTINUOUS PRN
Status: DISCONTINUED | OUTPATIENT
Start: 2024-07-30 | End: 2024-07-30 | Stop reason: SDUPTHER

## 2024-07-30 RX ORDER — LIDOCAINE HYDROCHLORIDE 10 MG/ML
INJECTION, SOLUTION EPIDURAL; INFILTRATION; INTRACAUDAL; PERINEURAL PRN
Status: DISCONTINUED | OUTPATIENT
Start: 2024-07-30 | End: 2024-07-30 | Stop reason: SDUPTHER

## 2024-07-30 RX ADMIN — LIDOCAINE HYDROCHLORIDE 40 MG: 10 INJECTION, SOLUTION EPIDURAL; INFILTRATION; INTRACAUDAL; PERINEURAL at 09:44

## 2024-07-30 RX ADMIN — PROPOFOL 100 MG: 10 INJECTION, EMULSION INTRAVENOUS at 09:44

## 2024-07-30 RX ADMIN — SODIUM CHLORIDE: 9 INJECTION, SOLUTION INTRAVENOUS at 08:43

## 2024-07-30 RX ADMIN — PROPOFOL 150 MCG/KG/MIN: 10 INJECTION, EMULSION INTRAVENOUS at 09:44

## 2024-07-30 ASSESSMENT — ENCOUNTER SYMPTOMS
SINUS PRESSURE: 0
ABDOMINAL PAIN: 0
NAUSEA: 0
SHORTNESS OF BREATH: 0
SHORTNESS OF BREATH: 0

## 2024-07-30 ASSESSMENT — LIFESTYLE VARIABLES: SMOKING_STATUS: 1

## 2024-07-30 ASSESSMENT — PAIN - FUNCTIONAL ASSESSMENT: PAIN_FUNCTIONAL_ASSESSMENT: 0-10

## 2024-07-30 NOTE — DISCHARGE INSTRUCTIONS
speed healing, resume normal activities as soon as you feel able. Most people feel well enough by the next day.   Ask your doctor when you can participate in any rigorous exercise.   Ask your doctor about when it is safe to shower, bathe, or soak in water.   You will be scheduled for a follow-up colonoscopy in the future. It will be important to check for recurrence of polyps.   Your doctor will discuss the results with you either the day of surgery or the following day.   Call Your Doctor   After arriving home, contact your doctor if any of the following occurs:   Signs of infection, including fever and chills   Redness, swelling, increasing pain, excessive bleeding, or discharge from the rectum (Up to cup of blood per day can be expected for up to 3-4 days following your polypectomy.)   Black, tarry stools   Severe abdominal pain   Hard, swollen abdomen   Inability to pass gas or stool   Cough , shortness of breath, chest pain, or severe nausea or vomiting   New, unexplained symptoms   In case of emergency,  CALL 911  .     Last Reviewed: December 2010 Emely Silverio MD   Updated: 4/7/2011

## 2024-07-30 NOTE — OP NOTE
COLONOSCOPY    DATE OF PROCEDURE: 7/30/2024    SURGEON: Aguila Lutz MD  Facility : [facility]  ASSISTANT: None  Anesthesia: Monitored anesthesia care  PREOPERATIVE DIAGNOSIS: Positive Cologuard    POSTOPERATIVE DIAGNOSIS: as described below    OPERATION: Total colonoscopy     ANESTHESIA: Monitored Anesthesia Care (MAC)    ESTIMATED BLOOD LOSS: less than 50 cc    COMPLICATIONS: None.     SPECIMENS:  Was Obtained:   2 ascending diminutive polyps cold forceps  4 sigmoid subcentimeter polyp snare and forcep    HISTORY: The patient is a 53 y.o. year old male with history of above preop diagnosis.  I recommended colonoscopy with possible biopsy or polypectomy and I explained the risk, benefits, expected outcome, and alternatives to the procedure.  Risks included but are not limited to medication allergy, medication reaction, cardiovascular and respiratory problems, bleeding, perforation, infection, and/or missed diagnosis.  The patient understands and is in agreement.        PROCEDURE: Following arrival in the endoscopy room, the patient was placed in the left lateral decubitus position and final time-out accomplished in the presence of the nursing staff. Baseline vital signs were obtained and reviewed. The patient was given IV Monitored Anesthesia Care (MAC) and vitals monitoring per anesthesia department.     Digital rectal exam- normal    The colonoscope was inserted per rectum and advanced under direct vision to the cecum without difficulty.      Post sedation note :The patient's SPO2 remained above 90% throughout the procedure.the vital signs remained stable , and no immediate complication form the procedure noted, patient will be ready for d/c when criteria is met .        The prep was good.      Findings:    Cecum/Ascending colon: abnormal: Two 3 mm polyps in cecum removed with cold forceps.  Few medium sized diverticuli and a 7    Transverse colon: normal    Descending/Sigmoid colon: abnormal: Few

## 2024-07-30 NOTE — H&P
HISTORY and PHYSICAL  [facility]       NAME:  Arnaldo Ornelas  MRN: [id number]   YOB: 1970   Date: 7/30/2024   Age: 53 y.o.  Gender: male       COMPLAINT AND PRESENT HISTORY:       Arnaldo Ornelas is 53 y.o.,   male, having a Diagnostic Colonoscopy, for hx of: Pre-Op Diagnosis Codes:     * Positive colorectal cancer screening using Cologuard test      No prior Colonoscopy was done,   this is the first time.     Denies abdominal pain including bloating/gas.    No changes in bowel habits.    No diarrhea, constipation, blood in stools, black tarry stools.      No changes in appetite and unintended weight loss. Denies any nausea or vomiting.  Pt has occasional  heartburn, indigestion or acid reflux.    Pt is taking Tums prn  Pt denies Family Hx of colon cancer.    Medical history reviewed:   Patient voices feeling well today. Denies any recent fever or chills, chest pain/pressure.  Pt reports no  SOB.     Patient has been NPO since midnight. No blood thinners in the past  -7 days. (Motrin) .  Pt took Lisinopril  this am    Patient states  has completed and followed prescribed prep with clear outcome.      Patient denies any personal or family problems with anesthesia.    RECENT LABS, IMAGING AND TESTING     Lab Results   Component Value Date    WBC 7.1 04/28/2022    RBC 4.93 04/28/2022    HGB 15.4 04/28/2022    HCT 45.0 04/28/2022    MCV 91.3 04/28/2022    MCH 31.3 04/28/2022    MCHC 34.2 04/28/2022    RDW 14.3 04/28/2022     04/28/2022    MPV 8.1 04/28/2022        Lab Results   Component Value Date     10/14/2023    K 4.5 10/14/2023     10/14/2023    CO2 25 10/14/2023    BUN 18 10/14/2023    CREATININE 1.0 10/14/2023    GLUCOSE 143 (H) 10/14/2023    CALCIUM 9.6 10/14/2023    BILITOT 0.32 03/12/2016    ALKPHOS 69 03/12/2016    AST 19 03/12/2016    ALT 21 03/12/2016         PAST MEDICAL HISTORY     Past Medical History:   Diagnosis Date    Acute left-sided low back pain

## 2024-07-30 NOTE — ANESTHESIA POSTPROCEDURE EVALUATION
Department of Anesthesiology  Postprocedure Note    Patient: Arnaldo Ornelas  MRN: [id number]  YOB: 1970  Date of evaluation: 7/30/2024    Procedure Summary       Date: 07/30/24 Room / Location: [place] [id number] / [facility]    Anesthesia Start: 0939 Anesthesia Stop: 1033    Procedure: COLONOSCOPY BIOPSY Diagnosis:       Positive colorectal cancer screening using Cologuard test      (Positive colorectal cancer screening using Cologuard test [R19.5])    Surgeons: Aguila Lutz MD Responsible Provider: Dalia Bowen MD    Anesthesia Type: general ASA Status: 3            Anesthesia Type: No value filed.    Ha Phase I: Ha Score: 10    Ha Phase II: Ha Score: 10    Anesthesia Post Evaluation    Comments: POST- ANESTHESIA EVALUATION       Pt Name: Arnaldo Ornelas  MRN: [id number]  YOB: 1970  Date of evaluation: 7/30/2024  Time:  11:33 AM      /69   Pulse 82   Temp 97.9 °F (36.6 °C)   Resp 27   Wt 132.5 kg (292 lb)   SpO2 94%   BMI 41.90 kg/m²      Consciousness Level  Awake  Cardiopulmonary Status  Stable  Pain Adequately Treated YES  Nausea / Vomiting  NO  Adequate Hydration  YES  Anesthesia Related Complications NONE      Electronically signed by Dalia Bowen MD on 7/30/2024 at 11:33 AM      No notable events documented.

## 2024-07-30 NOTE — ANESTHESIA PRE PROCEDURE
Department of Anesthesiology  Preprocedure Note       Name:  Arnaldo Ornelas   Age:  53 y.o.  :  1970                                          MRN:  [id number]         Date:  2024      Surgeon: Surgeon(s):  Aguila Lutz MD    Procedure: Procedure(s):  COLONOSCOPY DIAGNOSTIC    Medications prior to admission:   Prior to Admission medications    Medication Sig Start Date End Date Taking? Authorizing Provider   lisinopril (PRINIVIL;ZESTRIL) 20 MG tablet TAKE 1 TABLET BY MOUTH EVERY DAY 24   Davie Haas MD   sodium-potassium-mag sulfate (SUPREP) 17.5-3.13-1.6 GM/177ML SOLN solution Take as directed by your doctor for bowel prep 7/15/24   Aguila Lutz MD   bisacodyl 5 MG EC tablet Take as directed by physician office for bowel prep 7/15/24   Aguila Lutz MD   Continuous Glucose Sensor (FREESTYLE ALANNA 3 SENSOR) MISC 1 each by Does not apply route every 14 days for 28 days 7/10/24 8/7/24  Davie Haas MD   glipiZIDE (GLUCOTROL) 5 MG tablet Take 1 tablet by mouth 2 times daily 24   Davie Haas MD   dulaglutide (TRULICITY) 1.5 MG/0.5ML SC injection Inject 0.5 mLs into the skin once a week for 8 doses 24  Niko Wiggins MD   rosuvastatin (CRESTOR) 20 MG tablet TAKE 1 TABLET BY MOUTH EVERY DAY 24   Davie Haas MD   metFORMIN (GLUCOPHAGE-XR) 500 MG extended release tablet Take 2 tablets by mouth 2 times daily 3/5/24   Yulisa Sharma DO   Insulin Pen Needle (ULTICARE MINI PEN NEEDLES) 31G X 6 MM MISC use 1 daily with VICTOZA INJECTION 22   Eileen Oneill MD       Current medications:    Current Facility-Administered Medications   Medication Dose Route Frequency Provider Last Rate Last Admin   • lidocaine PF 1 % injection 1 mL  1 mL IntraDERmal Once PRN Dalia Bowen MD       • sodium chloride flush 0.9 % injection 5-40 mL  5-40 mL IntraVENous 2 times per day Dalia Bowen MD       • sodium chloride flush 0.9 % injection 5-40 mL  5-40 mL IntraVENous PRN

## 2024-07-31 LAB — SURGICAL PATHOLOGY REPORT: NORMAL

## 2024-08-02 ENCOUNTER — TELEPHONE (OUTPATIENT)
Dept: GASTROENTEROLOGY | Age: 54
End: 2024-08-02

## 2024-08-02 NOTE — TELEPHONE ENCOUNTER
Writer called and spoke to pt to advise polyps are adenomatous which means that they were progressing to become cancer in the future but were not cancerous, yet. Pt had no further concerns at this time.

## 2024-08-06 DIAGNOSIS — E08.3293 DIABETES MELLITUS DUE TO UNDERLYING CONDITION WITH MILD NONPROLIFERATIVE RETINOPATHY OF BOTH EYES, WITHOUT LONG-TERM CURRENT USE OF INSULIN, MACULAR EDEMA PRESENCE UNSPECIFIED (HCC): ICD-10-CM

## 2024-08-06 RX ORDER — GLIPIZIDE 5 MG/1
5 TABLET ORAL 2 TIMES DAILY
Qty: 90 TABLET | Refills: 3 | Status: SHIPPED | OUTPATIENT
Start: 2024-08-06

## 2024-08-06 NOTE — TELEPHONE ENCOUNTER
Last visit: 07/09/24  Last Med refill:   Does patient have enough medication for 72 hours: No:     Next Visit Date:  Future Appointments   Date Time Provider Department Center   10/16/2024  1:15 PM Davie Haas MD [facility]       Health Maintenance   Topic Date Due    Hepatitis B vaccine (1 of 3 - 3-dose series) Never done    Pneumococcal 0-64 years Vaccine (2 of 2 - PCV) 08/25/2017    COVID-19 Vaccine (3 - 2023-24 season) 09/01/2023    Diabetic retinal exam  03/08/2024    Flu vaccine (1) 08/01/2024    Lung Cancer Screening &/or Counseling  09/07/2024    A1C test (Diabetic or Prediabetic)  09/13/2024    Diabetic Alb to Cr ratio (uACR) test  10/14/2024    Lipids  10/14/2024    GFR test (Diabetes, CKD 3-4, OR last GFR 15-59)  10/14/2024    Diabetic foot exam  12/21/2024    Depression Screen  07/09/2025    DTaP/Tdap/Td vaccine (3 - Td or Tdap) 06/17/2032    Colorectal Cancer Screen  07/30/2034    Shingles vaccine  Completed    Hepatitis C screen  Completed    HIV screen  Completed    Hepatitis A vaccine  Aged Out    Hib vaccine  Aged Out    Polio vaccine  Aged Out    Meningococcal (ACWY) vaccine  Aged Out    Diabetes screen  Discontinued       Hemoglobin A1C (%)   Date Value   06/13/2024 9.9   03/14/2024 9.4   11/30/2023 7.4             ( goal A1C is < 7)   No components found for: \"LABMICR\"  No components found for: \"LDLCHOLESTEROL\", \"LDLCALC\"    (goal LDL is <100)   AST (U/L)   Date Value   03/12/2016 19     ALT (U/L)   Date Value   03/12/2016 21     BUN (mg/dL)   Date Value   10/14/2023 18     BP Readings from Last 3 Encounters:   07/30/24 112/69   07/09/24 114/71   06/13/24 134/86          (goal 120/80)    All Future Testing planned in [facility]  Lab Frequency Next Occurrence   Lumbar Epidural Steroid Injection/Caudal Once 11/10/2023   Comprehensive Metabolic Panel Once 11/30/2023               Patient Active Problem List:     Elevated blood pressure reading     Smoker     Morbid obesity (HCC)

## 2024-08-06 NOTE — TELEPHONE ENCOUNTER
Last visit: 07/09/24  Last Med refill:   Does patient have enough medication for 72 hours: No:     Next Visit Date:  Future Appointments   Date Time Provider Department Center   10/16/2024  1:15 PM Davie Haas MD [facility]       Health Maintenance   Topic Date Due    Hepatitis B vaccine (1 of 3 - 3-dose series) Never done    Pneumococcal 0-64 years Vaccine (2 of 2 - PCV) 08/25/2017    COVID-19 Vaccine (3 - 2023-24 season) 09/01/2023    Diabetic retinal exam  03/08/2024    Flu vaccine (1) 08/01/2024    Lung Cancer Screening &/or Counseling  09/07/2024    A1C test (Diabetic or Prediabetic)  09/13/2024    Diabetic Alb to Cr ratio (uACR) test  10/14/2024    Lipids  10/14/2024    GFR test (Diabetes, CKD 3-4, OR last GFR 15-59)  10/14/2024    Diabetic foot exam  12/21/2024    Depression Screen  07/09/2025    DTaP/Tdap/Td vaccine (3 - Td or Tdap) 06/17/2032    Colorectal Cancer Screen  07/30/2034    Shingles vaccine  Completed    Hepatitis C screen  Completed    HIV screen  Completed    Hepatitis A vaccine  Aged Out    Hib vaccine  Aged Out    Polio vaccine  Aged Out    Meningococcal (ACWY) vaccine  Aged Out    Diabetes screen  Discontinued       Hemoglobin A1C (%)   Date Value   06/13/2024 9.9   03/14/2024 9.4   11/30/2023 7.4             ( goal A1C is < 7)   No components found for: \"LABMICR\"  No components found for: \"LDLCHOLESTEROL\", \"LDLCALC\"    (goal LDL is <100)   AST (U/L)   Date Value   03/12/2016 19     ALT (U/L)   Date Value   03/12/2016 21     BUN (mg/dL)   Date Value   10/14/2023 18     BP Readings from Last 3 Encounters:   07/30/24 112/69   07/09/24 114/71   06/13/24 134/86          (goal 120/80)    All Future Testing planned in [facility]  Lab Frequency Next Occurrence   Lumbar Epidural Steroid Injection/Caudal Once 11/10/2023   Comprehensive Metabolic Panel Once 11/30/2023               Patient Active Problem List:     Elevated blood pressure reading     Smoker     Morbid obesity (HCC)

## 2024-08-07 RX ORDER — DULAGLUTIDE 1.5 MG/.5ML
1.5 INJECTION, SOLUTION SUBCUTANEOUS WEEKLY
Qty: 2 ML | Refills: 2 | Status: SHIPPED | OUTPATIENT
Start: 2024-08-07 | End: 2024-09-26

## 2024-08-08 PROBLEM — Z01.00 VISION TEST: Status: RESOLVED | Noted: 2024-07-09 | Resolved: 2024-08-08

## 2024-10-01 DIAGNOSIS — E08.3293 DIABETES MELLITUS DUE TO UNDERLYING CONDITION WITH MILD NONPROLIFERATIVE RETINOPATHY OF BOTH EYES, WITHOUT LONG-TERM CURRENT USE OF INSULIN, MACULAR EDEMA PRESENCE UNSPECIFIED (HCC): ICD-10-CM

## 2024-10-02 RX ORDER — METFORMIN HCL 500 MG
TABLET, EXTENDED RELEASE 24 HR ORAL
Qty: 120 TABLET | Refills: 0 | Status: SHIPPED | OUTPATIENT
Start: 2024-10-02

## 2024-10-02 NOTE — TELEPHONE ENCOUNTER
Last visit: 07/09/2024  Last Med refill: 03/05/2024  Does patient have enough medication for 72 hours: No:     Next Visit Date:  Future Appointments   Date Time Provider Department Center   10/16/2024  1:15 PM Davie Haas MD [facility]       Health Maintenance   Topic Date Due    Hepatitis B vaccine (1 of 3 - 19+ 3-dose series) Never done    Pneumococcal 0-64 years Vaccine (2 of 2 - PCV) 08/25/2017    Diabetic retinal exam  03/08/2024    Flu vaccine (1) 08/01/2024    COVID-19 Vaccine (3 - 2023-24 season) 09/01/2024    Lung Cancer Screening &/or Counseling  09/07/2024    A1C test (Diabetic or Prediabetic)  09/13/2024    Diabetic Alb to Cr ratio (uACR) test  10/14/2024    Lipids  10/14/2024    GFR test (Diabetes, CKD 3-4, OR last GFR 15-59)  10/14/2024    Diabetic foot exam  12/21/2024    Depression Screen  07/09/2025    DTaP/Tdap/Td vaccine (3 - Td or Tdap) 06/17/2032    Colorectal Cancer Screen  07/30/2034    Shingles vaccine  Completed    Hepatitis C screen  Completed    HIV screen  Completed    Hepatitis A vaccine  Aged Out    Hib vaccine  Aged Out    Polio vaccine  Aged Out    Meningococcal (ACWY) vaccine  Aged Out    Diabetes screen  Discontinued       Hemoglobin A1C (%)   Date Value   06/13/2024 9.9   03/14/2024 9.4   11/30/2023 7.4             ( goal A1C is < 7)   No components found for: \"LABMICR\"  No components found for: \"LDLCHOLESTEROL\", \"LDLCALC\"    (goal LDL is <100)   AST (U/L)   Date Value   03/12/2016 19     ALT (U/L)   Date Value   03/12/2016 21     BUN (mg/dL)   Date Value   10/14/2023 18     BP Readings from Last 3 Encounters:   07/30/24 112/69   07/09/24 114/71   06/13/24 134/86          (goal 120/80)    All Future Testing planned in [facility]  Lab Frequency Next Occurrence   Lumbar Epidural Steroid Injection/Caudal Once 11/10/2023   Comprehensive Metabolic Panel Once 11/30/2023               Patient Active Problem List:     Elevated blood pressure reading     Smoker     Morbid

## 2024-10-27 DIAGNOSIS — E08.3293 DIABETES MELLITUS DUE TO UNDERLYING CONDITION WITH MILD NONPROLIFERATIVE RETINOPATHY OF BOTH EYES, WITHOUT LONG-TERM CURRENT USE OF INSULIN, MACULAR EDEMA PRESENCE UNSPECIFIED (HCC): ICD-10-CM

## 2024-10-27 RX ORDER — METFORMIN HYDROCHLORIDE 500 MG/1
TABLET, EXTENDED RELEASE ORAL
Qty: 120 TABLET | Refills: 3 | Status: SHIPPED | OUTPATIENT
Start: 2024-10-27

## 2024-11-03 DIAGNOSIS — E08.3293 DIABETES MELLITUS DUE TO UNDERLYING CONDITION WITH MILD NONPROLIFERATIVE RETINOPATHY OF BOTH EYES, WITHOUT LONG-TERM CURRENT USE OF INSULIN, MACULAR EDEMA PRESENCE UNSPECIFIED (HCC): ICD-10-CM

## 2024-11-04 RX ORDER — DULAGLUTIDE 1.5 MG/.5ML
1.5 INJECTION, SOLUTION SUBCUTANEOUS WEEKLY
Qty: 2 ML | Refills: 5 | Status: SHIPPED | OUTPATIENT
Start: 2024-11-04

## 2024-11-04 NOTE — TELEPHONE ENCOUNTER
E-scribe request for trulicity. Please review and e-scribe if applicable.     Last Visit Date:  7/9/24  Next Visit Date:  Visit date not found    Hemoglobin A1C (%)   Date Value   06/13/2024 9.9   03/14/2024 9.4   11/30/2023 7.4             ( goal A1C is < 7)   No components found for: \"LABMICR\"  No components found for: \"LDLCHOLESTEROL\", \"LDLCALC\"    (goal LDL is <100)   AST (U/L)   Date Value   03/12/2016 19     ALT (U/L)   Date Value   03/12/2016 21     BUN (mg/dL)   Date Value   10/14/2023 18     BP Readings from Last 3 Encounters:   07/30/24 112/69   07/09/24 114/71   06/13/24 134/86          (goal 120/80)        Patient Active Problem List:     Elevated blood pressure reading     Smoker     Morbid obesity     Need for vaccination     Right arm numbness     Hypovitaminosis D     Tobacco abuse     Essential hypertension     Smoking     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     Acute left-sided low back pain with left-sided sciatica     Left lumbar radiculopathy     Lumbar disc herniation     Mild nonproliferative diabetic retinopathy associated with type 2 diabetes mellitus (HCC)     Mixed hyperlipidemia      ----JF

## 2024-12-18 DIAGNOSIS — E78.2 MIXED HYPERLIPIDEMIA: ICD-10-CM

## 2024-12-18 NOTE — TELEPHONE ENCOUNTER
retinopathy of both eyes, without long-term current use of insulin (HCC)     Skin lesion     Ganglion cyst of wrist, left     Lumbar back pain     Right groin pain     Numbness and tingling of lower extremity     Acute left-sided low back pain with left-sided sciatica     Left lumbar radiculopathy     Lumbar disc herniation     Mild nonproliferative diabetic retinopathy associated with type 2 diabetes mellitus (HCC)     Mixed hyperlipidemia

## 2024-12-20 RX ORDER — ROSUVASTATIN CALCIUM 20 MG/1
20 TABLET, COATED ORAL DAILY
Qty: 90 TABLET | Refills: 3 | Status: SHIPPED | OUTPATIENT
Start: 2024-12-20

## 2024-12-31 ENCOUNTER — OFFICE VISIT (OUTPATIENT)
Dept: FAMILY MEDICINE CLINIC | Age: 54
End: 2024-12-31
Payer: COMMERCIAL

## 2024-12-31 VITALS
WEIGHT: 302.2 LBS | SYSTOLIC BLOOD PRESSURE: 117 MMHG | BODY MASS INDEX: 43.26 KG/M2 | HEIGHT: 70 IN | DIASTOLIC BLOOD PRESSURE: 77 MMHG | HEART RATE: 103 BPM

## 2024-12-31 DIAGNOSIS — E78.2 MIXED HYPERLIPIDEMIA: ICD-10-CM

## 2024-12-31 DIAGNOSIS — Z23 NEED FOR VACCINATION WITH 20-POLYVALENT PNEUMOCOCCAL CONJUGATE VACCINE: ICD-10-CM

## 2024-12-31 DIAGNOSIS — E08.3293 DIABETES MELLITUS DUE TO UNDERLYING CONDITION WITH MILD NONPROLIFERATIVE RETINOPATHY OF BOTH EYES, WITHOUT LONG-TERM CURRENT USE OF INSULIN, MACULAR EDEMA PRESENCE UNSPECIFIED (HCC): Primary | ICD-10-CM

## 2024-12-31 LAB — HBA1C MFR BLD: 7.9 %

## 2024-12-31 PROCEDURE — 83036 HEMOGLOBIN GLYCOSYLATED A1C: CPT

## 2024-12-31 PROCEDURE — 90677 PCV20 VACCINE IM: CPT | Performed by: STUDENT IN AN ORGANIZED HEALTH CARE EDUCATION/TRAINING PROGRAM

## 2024-12-31 RX ORDER — ROSUVASTATIN CALCIUM 20 MG/1
20 TABLET, COATED ORAL DAILY
Qty: 90 TABLET | Refills: 3 | Status: SHIPPED | OUTPATIENT
Start: 2024-12-31

## 2024-12-31 ASSESSMENT — ENCOUNTER SYMPTOMS
GASTROINTESTINAL NEGATIVE: 1
RESPIRATORY NEGATIVE: 1

## 2024-12-31 ASSESSMENT — PATIENT HEALTH QUESTIONNAIRE - PHQ9
SUM OF ALL RESPONSES TO PHQ QUESTIONS 1-9: 0
1. LITTLE INTEREST OR PLEASURE IN DOING THINGS: NOT AT ALL
SUM OF ALL RESPONSES TO PHQ QUESTIONS 1-9: 0
SUM OF ALL RESPONSES TO PHQ9 QUESTIONS 1 & 2: 0
2. FEELING DOWN, DEPRESSED OR HOPELESS: NOT AT ALL

## 2024-12-31 NOTE — PROGRESS NOTES
Attending Physician Statement  I  have discussed the care of Arnaldo Ornelas including pertinent history and exam findings with the resident. I agree with the assessment, plan and orders as documented by the resident.      /77 (Site: Right Upper Arm, Position: Sitting, Cuff Size: Medium Adult)   Pulse (!) 103   Ht 1.778 m (5' 10\")   Wt (!) 137.1 kg (302 lb 3.2 oz)   BMI 43.36 kg/m²    BP Readings from Last 3 Encounters:   12/31/24 117/77   07/30/24 112/69   07/09/24 114/71     Wt Readings from Last 3 Encounters:   12/31/24 (!) 137.1 kg (302 lb 3.2 oz)   07/30/24 132.5 kg (292 lb)   07/18/24 132.5 kg (292 lb)          Diagnosis Orders   1. Diabetes mellitus due to underlying condition with mild nonproliferative retinopathy of both eyes, without long-term current use of insulin, macular edema presence unspecified (HCC)  POCT glycosylated hemoglobin (Hb A1C)    Albumin/Creatinine Ratio, Urine    Lipid Panel    Basic Metabolic Panel      2. Mixed hyperlipidemia  rosuvastatin (CRESTOR) 20 MG tablet      3. Need for vaccination with 20-polyvalent pneumococcal conjugate vaccine  Pneumococcal, PCV20, PREVNAR 20, (age 6w+), IM, PF              Valerie Ballesteros DO 1/2/2025 3:58 PM

## 2024-12-31 NOTE — PROGRESS NOTES
Visit Information    Have you changed or started any medications since your last visit including any over-the-counter medicines, vitamins, or herbal medicines? no   Are you having any side effects from any of your medications? -  no  Have you stopped taking any of your medications? Is so, why? -  no    Have you seen any other physician or provider since your last visit? No  Have you had any other diagnostic tests since your last visit? No  Have you been seen in the emergency room and/or had an admission to a hospital since we last saw you? No  Have you had your routine dental cleaning in the past 6 months? no    Have you activated your [employer] account? If not, what are your barriers? Yes     Patient Care Team:  Davie Haas MD as PCP - General (Family Medicine)  Valerie Ballesteros DO as PCP - Empaneled Provider    Medical History Review  Past Medical, Family, and Social History reviewed and does not contribute to the patient presenting condition    Health Maintenance   Topic Date Due    Hepatitis B vaccine (1 of 3 - 19+ 3-dose series) Never done    Pneumococcal 0-64 years Vaccine (2 of 2 - PCV) 08/25/2017    Diabetic retinal exam  03/08/2024    Flu vaccine (1) 08/01/2024    COVID-19 Vaccine (3 - 2023-24 season) 09/01/2024    Lung Cancer Screening &/or Counseling  09/07/2024    A1C test (Diabetic or Prediabetic)  09/13/2024    Diabetic Alb to Cr ratio (uACR) test  10/14/2024    Lipids  10/14/2024    GFR test (Diabetes, CKD 3-4, OR last GFR 15-59)  10/14/2024    Diabetic foot exam  12/21/2024    Depression Screen  07/09/2025    DTaP/Tdap/Td vaccine (3 - Td or Tdap) 06/17/2032    Colorectal Cancer Screen  07/30/2034    Shingles vaccine  Completed    Hepatitis C screen  Completed    HIV screen  Completed    Hepatitis A vaccine  Aged Out    Hib vaccine  Aged Out    Polio vaccine  Aged Out    Meningococcal (ACWY) vaccine  Aged Out    Diabetes screen  Discontinued

## 2024-12-31 NOTE — PATIENT INSTRUCTIONS
Thank you for letting us take care of you today. We hope all your questions were addressed. If a question was overlooked or something else comes to mind after you return home, please contact a member of your Care Team listed below.        Your Care Team at [facility] is Team #4  Valerie Ballesteros M.D. (Faculty)  Davie Haas M.D. (Resident)  Kristen Hanley M.D.  (Resident)  Amaris Gregory M.D. (Resident)  Sohail Pierre M.D. (Resident)  Zak Rebolledo, TATA Wilcox, [facility]  Latosha Iraheta, [facility]  Sherrie Heard, [facility]  Radhika Day, [facility]  Joann Joy, [facility]  Naz Ramos (LJ) [facility] (Clinical Practice Manager)  Luz Perry [facility] (Clinical Pharmacist)       Office phone number: [phone]    If you need to get in right away due to illness, please be advised we have \"Same Day\" appointments available Monday-Friday. Please call us at [phone] option #3 to schedule your \"Same Day\" appointment.

## 2024-12-31 NOTE — PROGRESS NOTES
[facility] Residency Program - Outpatient Note      Subjective:    Arnaldo Ornelas is a 54 y.o. male with  has a past medical history of Acute left-sided low back pain with left-sided sciatica, Diabetes mellitus (HCC), Essential hypertension, Lumbar back pain, Mixed hyperlipidemia, Morbid obesity, and Tobacco abuse.    Presented to the office today for:  Chief Complaint   Patient presents with    Medication Refill     Med has already been filled    No other c/o any kind    Diabetes     A1c check       HPI  54-year-old male established patient is here to follow-up on his diabetes    DM  Improved  A1c 9.9 > 7.9 today  On Metformin 2 g daily, Trulicity 1.5 Mg weekly, Glipizide 5 mg twice daily   BG readings at home are around 130s-140s  Does not report any symptoms of hyper or hypoglycemia    Hypertension  Controlled  Continue current regimen lisinopril 20 mg    PCV 20 administered today    Patient is due for lipid panel, urine microalbumin, GFR testing, will place orders today    Patient already has referral for ophthalmology for diabetic retinal exam, reports will schedule soon      Review of Systems   Constitutional: Negative.    HENT: Negative.     Respiratory: Negative.     Cardiovascular: Negative.    Gastrointestinal: Negative.    Genitourinary: Negative.    Musculoskeletal: Negative.    Neurological: Negative.    Psychiatric/Behavioral: Negative.                   The patient has a   Family History   Problem Relation Age of Onset    Heart Disease Mother     High Blood Pressure Mother     Diabetes Mother     Cancer Father        Objective:    /77 (Site: Right Upper Arm, Position: Sitting, Cuff Size: Medium Adult)   Pulse (!) 103   Ht 1.778 m (5' 10\")   Wt (!) 137.1 kg (302 lb 3.2 oz)   BMI 43.36 kg/m²    BP Readings from Last 3 Encounters:   12/31/24 117/77   07/30/24 112/69   07/09/24 114/71       Physical Exam  Vitals and nursing note reviewed.   Cardiovascular:

## 2025-03-19 NOTE — TELEPHONE ENCOUNTER
Procedure scheduled/Dr Lutz  Procedure:Colonoscopy  Dx:Positive colorectal cancer screening using Cologuard test  Date:07/30/2024  Time:10:00 am/ Arrive: 8:00 am  Hospital:[facility] phone call:julia  Bowel Prep instructions given:Suprep  In office/via phone:phone   Clearance needed:none    Patient wanted to be scheduled sooner. Patient is scheduled with Dr. Lutz   Stable.  Continue follow-up with cardiology

## 2025-03-25 ENCOUNTER — OFFICE VISIT (OUTPATIENT)
Dept: FAMILY MEDICINE CLINIC | Age: 55
End: 2025-03-25
Payer: COMMERCIAL

## 2025-03-25 VITALS
DIASTOLIC BLOOD PRESSURE: 76 MMHG | BODY MASS INDEX: 43.86 KG/M2 | HEART RATE: 98 BPM | HEIGHT: 70 IN | WEIGHT: 306.4 LBS | SYSTOLIC BLOOD PRESSURE: 121 MMHG

## 2025-03-25 DIAGNOSIS — E66.813 OBESITY, CLASS 3: ICD-10-CM

## 2025-03-25 DIAGNOSIS — E08.3293: Primary | ICD-10-CM

## 2025-03-25 LAB — HBA1C MFR BLD: 8.9 %

## 2025-03-25 PROCEDURE — 99214 OFFICE O/P EST MOD 30 MIN: CPT | Performed by: STUDENT IN AN ORGANIZED HEALTH CARE EDUCATION/TRAINING PROGRAM

## 2025-03-25 PROCEDURE — 83036 HEMOGLOBIN GLYCOSYLATED A1C: CPT | Performed by: STUDENT IN AN ORGANIZED HEALTH CARE EDUCATION/TRAINING PROGRAM

## 2025-03-25 PROCEDURE — 3074F SYST BP LT 130 MM HG: CPT | Performed by: STUDENT IN AN ORGANIZED HEALTH CARE EDUCATION/TRAINING PROGRAM

## 2025-03-25 PROCEDURE — 3078F DIAST BP <80 MM HG: CPT | Performed by: STUDENT IN AN ORGANIZED HEALTH CARE EDUCATION/TRAINING PROGRAM

## 2025-03-25 RX ORDER — DULAGLUTIDE 3 MG/.5ML
3 INJECTION, SOLUTION SUBCUTANEOUS WEEKLY
Qty: 4 ADJUSTABLE DOSE PRE-FILLED PEN SYRINGE | Refills: 3 | Status: SHIPPED | OUTPATIENT
Start: 2025-03-25

## 2025-03-25 SDOH — ECONOMIC STABILITY: FOOD INSECURITY: WITHIN THE PAST 12 MONTHS, YOU WORRIED THAT YOUR FOOD WOULD RUN OUT BEFORE YOU GOT MONEY TO BUY MORE.: NEVER TRUE

## 2025-03-25 SDOH — ECONOMIC STABILITY: FOOD INSECURITY: WITHIN THE PAST 12 MONTHS, THE FOOD YOU BOUGHT JUST DIDN'T LAST AND YOU DIDN'T HAVE MONEY TO GET MORE.: NEVER TRUE

## 2025-03-25 ASSESSMENT — PATIENT HEALTH QUESTIONNAIRE - PHQ9
SUM OF ALL RESPONSES TO PHQ QUESTIONS 1-9: 0
DEPRESSION UNABLE TO ASSESS: PT REFUSES
1. LITTLE INTEREST OR PLEASURE IN DOING THINGS: NOT AT ALL
SUM OF ALL RESPONSES TO PHQ QUESTIONS 1-9: 0
SUM OF ALL RESPONSES TO PHQ QUESTIONS 1-9: 0
2. FEELING DOWN, DEPRESSED OR HOPELESS: NOT AT ALL
SUM OF ALL RESPONSES TO PHQ QUESTIONS 1-9: 0

## 2025-03-25 NOTE — PROGRESS NOTES
[facility]  [facility]  [address]  [place] [address]     Date of Visit:  3/26/2025  Patient Name: Arnaldo Ornelas   Patient :  1970       Arnaldo Ornelas is a 54 y.o. male who presents today for an general visit to be evaluated for the following condition(s):  Chief Complaint   Patient presents with    Diabetes     3 month follow up on DM        HPI     Patient is here for diabetes mlbsgo-kg-C6r is 8.9 today he states he has not taken his medications over the last few months as prescribed.  He is asymptomatic at this time.  He has not followed up with ophthalmology for his retinal exam and is due for diabetic foot exam today.      Review of Systems:  Review of Systems   Constitutional:  Negative for activity change and appetite change.   Respiratory:  Negative for cough, chest tightness and shortness of breath.    Cardiovascular:  Negative for chest pain, palpitations and leg swelling.   Gastrointestinal:  Negative for abdominal distention and abdominal pain.   Genitourinary:  Negative for difficulty urinating and dysuria.   Musculoskeletal:  Negative for arthralgias, back pain, joint swelling and myalgias.   Psychiatric/Behavioral:  Negative for agitation, behavioral problems, confusion, dysphoric mood and sleep disturbance.        Prior to Admission medications    Medication Sig Start Date End Date Taking? Authorizing Provider   Dulaglutide (TRULICITY) 3 MG/0.5ML SOAJ Inject 3 mg into the skin once a week 3/25/25  Yes Valerie Ballesteros,    rosuvastatin (CRESTOR) 20 MG tablet Take 1 tablet by mouth daily 24  Yes Davie Haas MD   metFORMIN (GLUCOPHAGE-XR) 500 MG extended release tablet TAKE 2 TABLETS BY MOUTH 2 TIMES A DAY 10/27/24  Yes Davie Haas MD   glipiZIDE (GLUCOTROL) 5 MG tablet Take 1 tablet by mouth 2 times daily 24  Yes Davie Haas MD   lisinopril (PRINIVIL;ZESTRIL) 20 MG tablet TAKE 1 TABLET BY MOUTH EVERY DAY 24  Yes Davie Haas MD

## 2025-03-25 NOTE — PROGRESS NOTES
Diabetic visit information    BP Readings from Last 3 Encounters:   12/31/24 117/77   07/30/24 112/69   07/09/24 114/71       Hemoglobin A1C (%)   Date Value   12/31/2024 7.9   06/13/2024 9.9   03/14/2024 9.4               Have you changed or started any medications since your last visit including any over-the-counter medicines, vitamins, or herbal medicines? no   Have you stopped taking any of your medications? Is so, why? -  no  Are you having any side effects from any of your medications? - no    Have you seen any other physician or provider since your last visit?  no   Have you had any other diagnostic tests since your last visit?  no   Have you been seen in the emergency room and/or had an admission in a hospital since we last saw you?  no     Have you had your annual diabetic retinal (eye) exam? No   (ensure copy of exam is in the chart)    Have you had your routine dental cleaning in the past 6 months? no    Do you have an active MyChart account?  If not, what are your barriers?  Yes    Patient Care Team:  Davie Haas MD as PCP - General (Family Medicine)  Valerie Ballesteros DO as PCP - Empaneled Provider    Medical history Review  Past Medical, Family, and Social History reviewed and does contribute to the patient presenting condition.    Health Maintenance   Topic Date Due    Hepatitis B vaccine (1 of 3 - 19+ 3-dose series) Never done    Diabetic retinal exam  03/08/2024    Flu vaccine (1) 08/01/2024    COVID-19 Vaccine (3 - 2024-25 season) 09/01/2024    Lung Cancer Screening &/or Counseling  09/07/2024    Diabetic Alb to Cr ratio (uACR) test  10/14/2024    Lipids  10/14/2024    GFR test (Diabetes, CKD 3-4, OR last GFR 15-59)  10/14/2024    Diabetic foot exam  12/21/2024    A1C test (Diabetic or Prediabetic)  12/31/2025    Depression Screen  12/31/2025    DTaP/Tdap/Td vaccine (3 - Td or Tdap) 06/17/2032    Colorectal Cancer Screen  07/30/2034    Shingles vaccine  Completed    Pneumococcal 50+ years Vaccine

## 2025-03-25 NOTE — PATIENT INSTRUCTIONS
Thank you for letting us take care of you today. We hope all your questions were addressed. If a question was overlooked or something else comes to mind after you return home, please contact a member of your Care Team listed below.        Your Care Team at [facility] is Team #4  Valerie Ballesteros M.D. (Faculty)  Davie Haas M.D. (Resident)  Kristen Hanley M.D.  (Resident)  Amaris Gregory M.D. (Resident)  Sohail Pierre M.D. (Resident)  Zak Rebolledo, TATA Wilcox, [facility]  Latosha Iraheta, [facility]  Sherrie Heard, [facility]  Radhika Day, [facility]  Joann Joy, [facility]  Naz Ramos (LJ) [facility] (Clinical Practice Manager)  Luz Perry [facility] (Clinical Pharmacist)       Office phone number: [phone]    If you need to get in right away due to illness, please be advised we have \"Same Day\" appointments available Monday-Friday. Please call us at [phone] option #3 to schedule your \"Same Day\" appointment.

## 2025-03-26 ASSESSMENT — ENCOUNTER SYMPTOMS
SHORTNESS OF BREATH: 0
BACK PAIN: 0
ABDOMINAL DISTENTION: 0
COUGH: 0
CHEST TIGHTNESS: 0
ABDOMINAL PAIN: 0

## 2025-04-13 DIAGNOSIS — E08.3293: ICD-10-CM

## 2025-04-14 RX ORDER — GLIPIZIDE 5 MG/1
5 TABLET ORAL 2 TIMES DAILY
Qty: 90 TABLET | Refills: 0 | Status: SHIPPED | OUTPATIENT
Start: 2025-04-14

## 2025-05-11 DIAGNOSIS — E08.3293 DIABETES MELLITUS DUE TO UNDERLYING CONDITION WITH MILD NONPROLIFERATIVE RETINOPATHY OF BOTH EYES, WITHOUT LONG-TERM CURRENT USE OF INSULIN, MACULAR EDEMA PRESENCE UNSPECIFIED (HCC): ICD-10-CM

## 2025-05-12 RX ORDER — METFORMIN HYDROCHLORIDE 500 MG/1
TABLET, EXTENDED RELEASE ORAL
Qty: 120 TABLET | Refills: 0 | Status: SHIPPED | OUTPATIENT
Start: 2025-05-12

## 2025-05-12 NOTE — TELEPHONE ENCOUNTER
Need for vaccination     Right arm numbness     Hypovitaminosis D     Tobacco abuse     Essential hypertension     Smoking     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     Acute left-sided low back pain with left-sided sciatica     Left lumbar radiculopathy     Lumbar disc herniation     Mild nonproliferative diabetic retinopathy associated with type 2 diabetes mellitus (HCC)     Mixed hyperlipidemia     Obesity, class 3 (E66.813)     Body mass index [BMI] 40.0-44.9, adult (Z68.41)

## 2025-05-28 NOTE — TELEPHONE ENCOUNTER
Last visit: 03/25/2025  Last Med refill: 08/06/2024  Does patient have enough medication for 72 hours: No:     Next Visit Date:  No future appointments.    Health Maintenance   Topic Date Due    Hepatitis B vaccine (1 of 3 - 19+ 3-dose series) Never done    Diabetic retinal exam  03/08/2024    COVID-19 Vaccine (3 - 2024-25 season) 09/01/2024    Lung Cancer Screening &/or Counseling  09/07/2024    Diabetic Alb to Cr ratio (uACR) test  10/14/2024    Lipids  10/14/2024    GFR test (Diabetes, CKD 3-4, OR last GFR 15-59)  10/14/2024    Flu vaccine (Season Ended) 08/01/2025    Diabetic foot exam  03/25/2026    A1C test (Diabetic or Prediabetic)  03/25/2026    Depression Screen  03/25/2026    DTaP/Tdap/Td vaccine (3 - Td or Tdap) 06/17/2032    Colorectal Cancer Screen  07/30/2034    Shingles vaccine  Completed    Pneumococcal 50+ years Vaccine  Completed    Hepatitis C screen  Completed    HIV screen  Completed    Hepatitis A vaccine  Aged Out    Hib vaccine  Aged Out    Polio vaccine  Aged Out    Meningococcal (ACWY) vaccine  Aged Out    Meningococcal B vaccine  Aged Out    Pneumococcal 0-49 years Vaccine  Discontinued    Diabetes screen  Discontinued       Hemoglobin A1C (%)   Date Value   03/25/2025 8.9   12/31/2024 7.9   06/13/2024 9.9             ( goal A1C is < 7)   No components found for: \"LABMICR\"  No components found for: \"LDLCHOLESTEROL\", \"LDLCALC\"    (goal LDL is <100)   AST (U/L)   Date Value   03/12/2016 19     ALT (U/L)   Date Value   03/12/2016 21     BUN (mg/dL)   Date Value   10/14/2023 18     BP Readings from Last 3 Encounters:   03/25/25 121/76   12/31/24 117/77   07/30/24 112/69          (goal 120/80)    All Future Testing planned in [facility]  Lab Frequency Next Occurrence   Albumin/Creatinine Ratio, Urine Once 03/25/2025   Lipid Panel Once 03/25/2025   Basic Metabolic Panel Once 03/25/2025               Patient Active Problem List:     Elevated blood pressure reading     Smoker     Morbid obesity      I agree with plan as above Chart, labs, vitals, radiology reviewed. Above H&P reviewed and edited where appropriate. Agree with history and physical exam. Agree with assessment and plan. I reviewed the overnight course of events and discussed the care with the patient/ family. All the decisions in assessment and plan are made by me. Chart, labs, vitals, radiology reviewed. Above H&P reviewed and edited where appropriate. Agree with history and physical exam. Agree with assessment and plan. I reviewed the overnight course of events and discussed the care with the patient/ family. All the decisions in assessment and plan are made by me.

## 2025-06-12 DIAGNOSIS — E08.3293 DIABETES MELLITUS DUE TO UNDERLYING CONDITION WITH MILD NONPROLIFERATIVE RETINOPATHY OF BOTH EYES, WITHOUT LONG-TERM CURRENT USE OF INSULIN, MACULAR EDEMA PRESENCE UNSPECIFIED (HCC): ICD-10-CM

## 2025-06-12 RX ORDER — METFORMIN HYDROCHLORIDE 500 MG/1
TABLET, EXTENDED RELEASE ORAL
Qty: 120 TABLET | Refills: 0 | Status: SHIPPED | OUTPATIENT
Start: 2025-06-12

## 2025-06-12 RX ORDER — GLIPIZIDE 5 MG/1
5 TABLET ORAL 2 TIMES DAILY
Qty: 90 TABLET | Refills: 0 | Status: SHIPPED | OUTPATIENT
Start: 2025-06-12

## 2025-06-12 NOTE — TELEPHONE ENCOUNTER
Last visit: 03/25/2025  Last Med refill:   Does patient have enough medication for 72 hours: No:     Next Visit Date:  No future appointments.    Health Maintenance   Topic Date Due    Hepatitis B vaccine (1 of 3 - 19+ 3-dose series) Never done    Diabetic retinal exam  03/08/2024    COVID-19 Vaccine (3 - 2024-25 season) 09/01/2024    Lung Cancer Screening &/or Counseling  09/07/2024    Diabetic Alb to Cr ratio (uACR) test  10/14/2024    Lipids  10/14/2024    GFR test (Diabetes, CKD 3-4, OR last GFR 15-59)  10/14/2024    Flu vaccine (Season Ended) 08/01/2025    Diabetic foot exam  03/25/2026    A1C test (Diabetic or Prediabetic)  03/25/2026    Depression Screen  03/25/2026    DTaP/Tdap/Td vaccine (3 - Td or Tdap) 06/17/2032    Colorectal Cancer Screen  07/30/2034    Shingles vaccine  Completed    Pneumococcal 50+ years Vaccine  Completed    Hepatitis C screen  Completed    HIV screen  Completed    Hepatitis A vaccine  Aged Out    Hib vaccine  Aged Out    Polio vaccine  Aged Out    Meningococcal (ACWY) vaccine  Aged Out    Meningococcal B vaccine  Aged Out    Pneumococcal 0-49 years Vaccine  Discontinued    Diabetes screen  Discontinued       Hemoglobin A1C (%)   Date Value   03/25/2025 8.9   12/31/2024 7.9   06/13/2024 9.9             ( goal A1C is < 7)   No components found for: \"LABMICR\"  No components found for: \"LDLCHOLESTEROL\", \"LDLCALC\"    (goal LDL is <100)   AST (U/L)   Date Value   03/12/2016 19     ALT (U/L)   Date Value   03/12/2016 21     BUN (mg/dL)   Date Value   10/14/2023 18     BP Readings from Last 3 Encounters:   03/25/25 121/76   12/31/24 117/77   07/30/24 112/69          (goal 120/80)    All Future Testing planned in [facility]  Lab Frequency Next Occurrence   Albumin/Creatinine Ratio, Urine Once 03/25/2025   Lipid Panel Once 03/25/2025   Basic Metabolic Panel Once 03/25/2025               Patient Active Problem List:     Elevated blood pressure reading     Smoker     Morbid obesity (HCC)     Need

## 2025-07-12 DIAGNOSIS — E08.3293 DIABETES MELLITUS DUE TO UNDERLYING CONDITION WITH MILD NONPROLIFERATIVE RETINOPATHY OF BOTH EYES, WITHOUT LONG-TERM CURRENT USE OF INSULIN, MACULAR EDEMA PRESENCE UNSPECIFIED (HCC): ICD-10-CM

## 2025-07-14 RX ORDER — DULAGLUTIDE 3 MG/.5ML
3 INJECTION, SOLUTION SUBCUTANEOUS WEEKLY
Qty: 2 ML | Refills: 0 | Status: SHIPPED | OUTPATIENT
Start: 2025-07-14

## 2025-07-14 NOTE — TELEPHONE ENCOUNTER
Last visit: 03/25/25  Last Med refill: 03/25/25  Does patient have enough medication for 72 hours: No:     Next Visit Date:  Future Appointments   Date Time Provider Department Center   7/31/2025  4:15 PM Ayan Estrada MD [facility]       Health Maintenance   Topic Date Due    Hepatitis B vaccine (1 of 3 - 19+ 3-dose series) Never done    Diabetic retinal exam  03/08/2024    COVID-19 Vaccine (3 - 2024-25 season) 09/01/2024    Lung Cancer Screening &/or Counseling  09/07/2024    Diabetic Alb to Cr ratio (uACR) test  10/14/2024    Lipids  10/14/2024    GFR test (Diabetes, CKD 3-4, OR last GFR 15-59)  10/14/2024    Flu vaccine (1) 08/01/2025    Diabetic foot exam  03/25/2026    A1C test (Diabetic or Prediabetic)  03/25/2026    Depression Screen  03/25/2026    DTaP/Tdap/Td vaccine (3 - Td or Tdap) 06/17/2032    Colorectal Cancer Screen  07/30/2034    Shingles vaccine  Completed    Pneumococcal 50+ years Vaccine  Completed    Hepatitis C screen  Completed    HIV screen  Completed    Hepatitis A vaccine  Aged Out    Hib vaccine  Aged Out    Polio vaccine  Aged Out    Meningococcal (ACWY) vaccine  Aged Out    Meningococcal B vaccine  Aged Out    Pneumococcal 0-49 years Vaccine  Discontinued    Diabetes screen  Discontinued       Hemoglobin A1C (%)   Date Value   03/25/2025 8.9   12/31/2024 7.9   06/13/2024 9.9             ( goal A1C is < 7)   No components found for: \"LABMICR\"  No components found for: \"LDLCHOLESTEROL\", \"LDLCALC\"    (goal LDL is <100)   AST (U/L)   Date Value   03/12/2016 19     ALT (U/L)   Date Value   03/12/2016 21     BUN (mg/dL)   Date Value   10/14/2023 18     BP Readings from Last 3 Encounters:   03/25/25 121/76   12/31/24 117/77   07/30/24 112/69          (goal 120/80)    All Future Testing planned in [facility]  Lab Frequency Next Occurrence   Albumin/Creatinine Ratio, Urine Once 03/25/2025   Lipid Panel Once 03/25/2025   Basic Metabolic Panel Once 03/25/2025               Patient

## 2025-07-15 DIAGNOSIS — E08.3293 DIABETES MELLITUS DUE TO UNDERLYING CONDITION WITH MILD NONPROLIFERATIVE RETINOPATHY OF BOTH EYES, WITHOUT LONG-TERM CURRENT USE OF INSULIN, MACULAR EDEMA PRESENCE UNSPECIFIED (HCC): ICD-10-CM

## 2025-07-15 RX ORDER — METFORMIN HYDROCHLORIDE 500 MG/1
1000 TABLET, EXTENDED RELEASE ORAL 2 TIMES DAILY
Qty: 120 TABLET | Refills: 0 | Status: SHIPPED | OUTPATIENT
Start: 2025-07-15

## 2025-07-15 NOTE — TELEPHONE ENCOUNTER
Last visit: 3/25/25  Last Med refill:   Does patient have enough medication for 72 hours: No:     Next Visit Date:  Future Appointments   Date Time Provider Department Center   7/31/2025  4:15 PM Ayan Estrada MD [facility]   Topic Date Due    Hepatitis B vaccine (1 of 3 - 19+ 3-dose series) Never done    Diabetic retinal exam  03/08/2024    COVID-19 Vaccine (3 - 2024-25 season) 09/01/2024    Lung Cancer Screening &/or Counseling  09/07/2024    Diabetic Alb to Cr ratio (uACR) test  10/14/2024    Lipids  10/14/2024    GFR test (Diabetes, CKD 3-4, OR last GFR 15-59)  10/14/2024    Flu vaccine (1) 08/01/2025    Diabetic foot exam  03/25/2026    A1C test (Diabetic or Prediabetic)  03/25/2026    Depression Screen  03/25/2026    DTaP/Tdap/Td vaccine (3 - Td or Tdap) 06/17/2032    Colorectal Cancer Screen  07/30/2034    Shingles vaccine  Completed    Pneumococcal 50+ years Vaccine  Completed    Hepatitis C screen  Completed    HIV screen  Completed    Hepatitis A vaccine  Aged Out    Hib vaccine  Aged Out    Polio vaccine  Aged Out    Meningococcal (ACWY) vaccine  Aged Out    Meningococcal B vaccine  Aged Out    Pneumococcal 0-49 years Vaccine  Discontinued    Diabetes screen  Discontinued       Hemoglobin A1C (%)   Date Value   03/25/2025 8.9   12/31/2024 7.9   06/13/2024 9.9             ( goal A1C is < 7)   No components found for: \"LABMICR\"  No components found for: \"LDLCHOLESTEROL\", \"LDLCALC\"    (goal LDL is <100)   AST (U/L)   Date Value   03/12/2016 19     ALT (U/L)   Date Value   03/12/2016 21     BUN (mg/dL)   Date Value   10/14/2023 18     BP Readings from Last 3 Encounters:   03/25/25 121/76   12/31/24 117/77   07/30/24 112/69          (goal 120/80)    All Future Testing planned in [facility]  Lab Frequency Next Occurrence   Albumin/Creatinine Ratio, Urine Once 03/25/2025   Lipid Panel Once 03/25/2025   Basic Metabolic Panel Once 03/25/2025               Patient Active

## 2025-07-31 ENCOUNTER — HOSPITAL ENCOUNTER (OUTPATIENT)
Dept: PAIN MANAGEMENT | Age: 55
Discharge: HOME | End: 2025-07-31
Payer: COMMERCIAL

## 2025-07-31 VITALS — WEIGHT: 306 LBS | HEIGHT: 70 IN | BODY MASS INDEX: 43.81 KG/M2

## 2025-07-31 DIAGNOSIS — M51.26 LUMBAR DISC HERNIATION: ICD-10-CM

## 2025-07-31 DIAGNOSIS — M54.16 LEFT LUMBAR RADICULOPATHY: Primary | ICD-10-CM

## 2025-07-31 PROCEDURE — 99214 OFFICE O/P EST MOD 30 MIN: CPT | Performed by: ANESTHESIOLOGY

## 2025-07-31 PROCEDURE — 99213 OFFICE O/P EST LOW 20 MIN: CPT

## 2025-07-31 ASSESSMENT — ENCOUNTER SYMPTOMS
RESPIRATORY NEGATIVE: 1
EYES NEGATIVE: 1
BACK PAIN: 1

## 2025-07-31 NOTE — PROGRESS NOTES
The patient is a 54 y.o.Non- / non  male.    Chief Complaint   Patient presents with    Back Pain     Discuss injection        HPI  This is a pleasant 54-year-old gentleman with past medical history significant for morbid obesity, BMI 44  He was last seen more than a year ago  History of chronic lower back pain  Returns today for back pain flareup  Pain located in the lower lumbar area reports radiation of pain down left leg over hip gluteal region posterior thigh into the lower leg  Describes it as deep aching throbbing sensation  Symptoms aggravated with bending twisting turning lifting  Alleviating factors rest and leg elevation  Associated symptoms include numbness and paresthesia  Denies any focal weakness or bladder bowel incontinence    Patient have tried and failed conservative measures with NSAID and physical therapy  MRI lumbar spine showed left-sided L4-L5 level disc herniation    Had an epidural steroid injection at L3-L4 level in November 2023 that provided him more than 1 year improvement in back and leg pain by more than 50% with improved range of motion and activity tolerance  Symptoms are similar to previous flareup  He is requesting repeat lumbar epidural steroid injection    Risk and benefit discussed with patient  Will schedule for lumbar epidural interlaminar steroid injection at L3-L4 with fluoroscopy guidance    Patient is here today for: back pain  Pain level: 6  Character: aching,shooting   Radiating: left leg down to ankle   Weakness or numbness: numbness  Aggravating Factors: lying down,walking  Alleviating Factors: rest,tylenol,motrin   Constant or intermitting: constant   Bladder/bowel loss: no        Past Medical History:   Diagnosis Date    Acute left-sided low back pain with left-sided sciatica 03/21/2023    Diabetes mellitus (HCC)     Essential hypertension 08/25/2016    Lumbar back pain 04/26/2022    Mixed hyperlipidemia 11/30/2023    Morbid obesity (HCC) 02/26/2016

## 2025-08-08 DIAGNOSIS — E08.3293 DIABETES MELLITUS DUE TO UNDERLYING CONDITION WITH MILD NONPROLIFERATIVE RETINOPATHY OF BOTH EYES, WITHOUT LONG-TERM CURRENT USE OF INSULIN, MACULAR EDEMA PRESENCE UNSPECIFIED (HCC): ICD-10-CM

## 2025-08-08 RX ORDER — DULAGLUTIDE 3 MG/.5ML
3 INJECTION, SOLUTION SUBCUTANEOUS WEEKLY
Qty: 2 ML | Refills: 0 | Status: SHIPPED | OUTPATIENT
Start: 2025-08-08

## 2025-08-10 DIAGNOSIS — E08.3293 DIABETES MELLITUS DUE TO UNDERLYING CONDITION WITH MILD NONPROLIFERATIVE RETINOPATHY OF BOTH EYES, WITHOUT LONG-TERM CURRENT USE OF INSULIN, MACULAR EDEMA PRESENCE UNSPECIFIED (HCC): ICD-10-CM

## 2025-08-11 RX ORDER — METFORMIN HYDROCHLORIDE 500 MG/1
1000 TABLET, EXTENDED RELEASE ORAL 2 TIMES DAILY
Qty: 120 TABLET | Refills: 0 | Status: SHIPPED | OUTPATIENT
Start: 2025-08-11

## 2025-08-13 ENCOUNTER — HOSPITAL ENCOUNTER (OUTPATIENT)
Dept: PAIN MANAGEMENT | Facility: CLINIC | Age: 55
Discharge: HOME | End: 2025-08-13
Payer: COMMERCIAL

## 2025-08-13 VITALS
HEIGHT: 70 IN | TEMPERATURE: 98 F | BODY MASS INDEX: 41.8 KG/M2 | DIASTOLIC BLOOD PRESSURE: 80 MMHG | WEIGHT: 292 LBS | SYSTOLIC BLOOD PRESSURE: 152 MMHG | HEART RATE: 88 BPM | RESPIRATION RATE: 15 BRPM | OXYGEN SATURATION: 95 %

## 2025-08-13 DIAGNOSIS — M54.16 LEFT LUMBAR RADICULOPATHY: Primary | ICD-10-CM

## 2025-08-13 DIAGNOSIS — M51.26 LUMBAR DISC HERNIATION: ICD-10-CM

## 2025-08-13 DIAGNOSIS — R52 PAIN MANAGEMENT: ICD-10-CM

## 2025-08-13 LAB — GLUCOSE BLD-MCNC: 100 MG/DL (ref 75–110)

## 2025-08-13 PROCEDURE — 82947 ASSAY GLUCOSE BLOOD QUANT: CPT

## 2025-08-13 PROCEDURE — 62323 NJX INTERLAMINAR LMBR/SAC: CPT | Performed by: ANESTHESIOLOGY

## 2025-08-13 PROCEDURE — 2500000003 HC RX 250 WO HCPCS: Performed by: ANESTHESIOLOGY

## 2025-08-13 PROCEDURE — 62323 NJX INTERLAMINAR LMBR/SAC: CPT

## 2025-08-13 PROCEDURE — 6360000004 HC RX CONTRAST MEDICATION: Performed by: ANESTHESIOLOGY

## 2025-08-13 PROCEDURE — 6360000002 HC RX W HCPCS: Performed by: ANESTHESIOLOGY

## 2025-08-13 RX ORDER — SODIUM CHLORIDE 0.9 % (FLUSH) 0.9 %
SYRINGE (ML) INJECTION
Status: COMPLETED | OUTPATIENT
Start: 2025-08-13 | End: 2025-08-13

## 2025-08-13 RX ORDER — DEXAMETHASONE SODIUM PHOSPHATE 10 MG/ML
INJECTION, SOLUTION INTRAMUSCULAR; INTRAVENOUS
Status: COMPLETED | OUTPATIENT
Start: 2025-08-13 | End: 2025-08-13

## 2025-08-13 RX ORDER — MIDAZOLAM HYDROCHLORIDE 2 MG/2ML
INJECTION, SOLUTION INTRAMUSCULAR; INTRAVENOUS
Status: COMPLETED | OUTPATIENT
Start: 2025-08-13 | End: 2025-08-13

## 2025-08-13 RX ORDER — LIDOCAINE HYDROCHLORIDE 10 MG/ML
INJECTION, SOLUTION EPIDURAL; INFILTRATION; INTRACAUDAL; PERINEURAL
Status: COMPLETED | OUTPATIENT
Start: 2025-08-13 | End: 2025-08-13

## 2025-08-13 RX ADMIN — IOHEXOL 3 ML: 180 INJECTION INTRAVENOUS at 12:54

## 2025-08-13 RX ADMIN — Medication 5 ML: at 12:55

## 2025-08-13 RX ADMIN — MIDAZOLAM HYDROCHLORIDE 1 MG: 1 INJECTION, SOLUTION INTRAMUSCULAR; INTRAVENOUS at 12:51

## 2025-08-13 RX ADMIN — LIDOCAINE HYDROCHLORIDE 3 ML: 10 INJECTION, SOLUTION EPIDURAL; INFILTRATION; INTRACAUDAL; PERINEURAL at 12:51

## 2025-08-13 RX ADMIN — DEXAMETHASONE SODIUM PHOSPHATE 5 MG: 10 INJECTION INTRAMUSCULAR; INTRAVENOUS at 12:55

## 2025-08-13 ASSESSMENT — PAIN DESCRIPTION - DESCRIPTORS: DESCRIPTORS: TIGHTNESS;ACHING;DULL

## 2025-08-18 DIAGNOSIS — E08.3293 DIABETES MELLITUS DUE TO UNDERLYING CONDITION WITH MILD NONPROLIFERATIVE RETINOPATHY OF BOTH EYES, WITHOUT LONG-TERM CURRENT USE OF INSULIN, MACULAR EDEMA PRESENCE UNSPECIFIED (HCC): ICD-10-CM

## 2025-08-18 DIAGNOSIS — I10 ESSENTIAL HYPERTENSION: ICD-10-CM

## 2025-08-18 RX ORDER — LISINOPRIL 20 MG/1
TABLET ORAL
Qty: 90 TABLET | Refills: 4 | Status: SHIPPED | OUTPATIENT
Start: 2025-08-18

## 2025-08-18 RX ORDER — GLIPIZIDE 5 MG/1
5 TABLET ORAL 2 TIMES DAILY
Qty: 90 TABLET | Refills: 0 | Status: SHIPPED | OUTPATIENT
Start: 2025-08-18

## (undated) DEVICE — GLOVE ORANGE PI 8   MSG9080

## (undated) DEVICE — DEFENDO AIR WATER SUCTION AND BIOPSY VALVE KIT FOR  OLYMPUS: Brand: DEFENDO AIR/WATER/SUCTION AND BIOPSY VALVE

## (undated) DEVICE — SNARE ENDOSCP AD SM L240CM LOOP W1.3CM SHTH DIA2.4MM POLYP

## (undated) DEVICE — ENDO KIT W/SYRINGE: Brand: MEDLINE INDUSTRIES, INC.

## (undated) DEVICE — FORCEPS BX L240CM WRK CHN 2.8MM STD CAP W/ NDL MIC MESH